# Patient Record
Sex: FEMALE | Race: WHITE | NOT HISPANIC OR LATINO
[De-identification: names, ages, dates, MRNs, and addresses within clinical notes are randomized per-mention and may not be internally consistent; named-entity substitution may affect disease eponyms.]

---
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
## 2021-03-03 PROBLEM — Z00.00 ENCOUNTER FOR PREVENTIVE HEALTH EXAMINATION: Status: ACTIVE | Noted: 2021-03-03

## 2021-04-19 ENCOUNTER — EMERGENCY (EMERGENCY)
Facility: HOSPITAL | Age: 21
LOS: 1 days | Discharge: HOME | End: 2021-04-19
Payer: COMMERCIAL

## 2021-04-19 VITALS
RESPIRATION RATE: 12 BRPM | SYSTOLIC BLOOD PRESSURE: 93 MMHG | HEART RATE: 56 BPM | OXYGEN SATURATION: 98 % | DIASTOLIC BLOOD PRESSURE: 60 MMHG | TEMPERATURE: 99 F

## 2021-04-19 LAB — SARS-COV-2 RNA SPEC QL NAA+PROBE: SIGNIFICANT CHANGE UP

## 2021-04-19 PROCEDURE — 99283 EMERGENCY DEPT VISIT LOW MDM: CPT

## 2021-04-19 PROCEDURE — U0005: CPT

## 2021-04-19 PROCEDURE — U0003: CPT

## 2021-04-19 PROCEDURE — 99282 EMERGENCY DEPT VISIT SF MDM: CPT

## 2021-04-19 NOTE — ED PROVIDER NOTE - OBJECTIVE STATEMENT
21yo F presenting for covid testing. Pt needs swab prior to [employer] game in 2 days. Pt feeling well, no symptoms. Denies fever, chills, cough, sob, cp, body aches, loss of smell/taste.

## 2021-04-19 NOTE — ED PROVIDER NOTE - PATIENT PORTAL LINK FT
You can access the FollowMyHealth Patient Portal offered by [facility] by registering at the following website: [email or web address]. By joining [employer]’s FollowMyHealth portal, you will also be able to view your health information using other applications (apps) compatible with our system.

## 2021-04-19 NOTE — ED ADULT TRIAGE NOTE - CHIEF COMPLAINT QUOTE
Patient A&Ox4, denies any pain or discomfort. Stated is in ED for Covid test for upcoming game. Denies any sick contacts or symptoms.

## 2021-05-05 ENCOUNTER — APPOINTMENT (OUTPATIENT)
Dept: ENDOCRINOLOGY | Facility: CLINIC | Age: 21
End: 2021-05-05
Payer: COMMERCIAL

## 2021-05-05 VITALS
HEIGHT: 67 IN | DIASTOLIC BLOOD PRESSURE: 72 MMHG | SYSTOLIC BLOOD PRESSURE: 118 MMHG | BODY MASS INDEX: 24.33 KG/M2 | WEIGHT: 155 LBS | HEART RATE: 66 BPM

## 2021-05-05 DIAGNOSIS — Z78.9 OTHER SPECIFIED HEALTH STATUS: ICD-10-CM

## 2021-05-05 PROCEDURE — 99072 ADDL SUPL MATRL&STAF TM PHE: CPT

## 2021-05-05 PROCEDURE — 99204 OFFICE O/P NEW MOD 45 MIN: CPT | Mod: 25

## 2021-08-04 ENCOUNTER — NON-APPOINTMENT (OUTPATIENT)
Age: 21
End: 2021-08-04

## 2021-09-01 ENCOUNTER — NON-APPOINTMENT (OUTPATIENT)
Age: 21
End: 2021-09-01

## 2021-09-19 ENCOUNTER — TRANSCRIPTION ENCOUNTER (OUTPATIENT)
Age: 21
End: 2021-09-19

## 2021-11-16 ENCOUNTER — APPOINTMENT (OUTPATIENT)
Dept: ENDOCRINOLOGY | Facility: CLINIC | Age: 21
End: 2021-11-16

## 2021-11-17 ENCOUNTER — NON-APPOINTMENT (OUTPATIENT)
Age: 21
End: 2021-11-17

## 2022-01-24 ENCOUNTER — NON-APPOINTMENT (OUTPATIENT)
Age: 22
End: 2022-01-24

## 2022-04-18 ENCOUNTER — NON-APPOINTMENT (OUTPATIENT)
Age: 22
End: 2022-04-18

## 2022-04-21 ENCOUNTER — NON-APPOINTMENT (OUTPATIENT)
Age: 22
End: 2022-04-21

## 2022-05-25 ENCOUNTER — NON-APPOINTMENT (OUTPATIENT)
Age: 22
End: 2022-05-25

## 2022-06-02 ENCOUNTER — APPOINTMENT (OUTPATIENT)
Dept: ENDOCRINOLOGY | Facility: CLINIC | Age: 22
End: 2022-06-02
Payer: COMMERCIAL

## 2022-06-02 VITALS
RESPIRATION RATE: 15 BRPM | HEART RATE: 44 BPM | DIASTOLIC BLOOD PRESSURE: 70 MMHG | SYSTOLIC BLOOD PRESSURE: 100 MMHG | BODY MASS INDEX: 25.43 KG/M2 | TEMPERATURE: 97.9 F | WEIGHT: 162 LBS | OXYGEN SATURATION: 98 % | HEIGHT: 67 IN

## 2022-06-02 PROCEDURE — 99214 OFFICE O/P EST MOD 30 MIN: CPT

## 2022-06-02 RX ORDER — DEXAMETHASONE 1 MG/1
1 TABLET ORAL
Qty: 1 | Refills: 0 | Status: COMPLETED | COMMUNITY
Start: 2022-05-25 | End: 2022-06-02

## 2022-06-02 NOTE — ASSESSMENT
[FreeTextEntry1] :  Leila is a 20 yr old female, presents today for follow up  in regards to amenorrhea.\par She has had blood work in 9/2020 showed normal testosterone, DHEAS, thyroid functions, prolactin \par Last year we did  hormonal eval, everything cam back normal but 24 hr urine cortisol in 8/21 was high around 123 mcg/24 hr\par We repeated it was 89 in 1/22\par We also did salivary cortisol was low 0.04 in 4/22\par Another repeat 24 hr urine cortisol was 88.7 in 5/22.\par \par All tests came back normal last year, but 24 hr urine cortisol high, to 123 , repeat in 80s.\par Salivary cortisol normal, DST was done recently pending.\par Will do 2 more salivary cortisols, will follow up on results of DST, will repeat hormonal eval again.\par To see Ob again.\par To back off on her work outs.\par \par \par RTC in 3 months\par

## 2022-06-02 NOTE — REVIEW OF SYSTEMS
[Irregular Menses] : irregular menses [Fatigue] : no fatigue [Decreased Appetite] : appetite not decreased [Recent Weight Gain (___ Lbs)] : no recent weight gain [Recent Weight Loss (___ Lbs)] : no recent weight loss [Visual Field Defect] : no visual field defect [Dry Eyes] : no dryness [Eye Pain] : no pain [Dysphagia] : no dysphagia [Neck Pain] : no neck pain [Dysphonia] : no dysphonia [Chest Pain] : no chest pain [Palpitations] : no palpitations [Lower Ext Edema] : no lower extremity edema [Shortness Of Breath] : no shortness of breath [Cough] : no cough [Orthopnea] : no orthopnea [Nausea] : no nausea [Constipation] : no constipation [Abdominal Pain] : no abdominal pain [Vomiting] : no vomiting [Diarrhea] : no diarrhea [Polyuria] : no polyuria [Dysuria] : no dysuria [Joint Pain] : no joint pain [Muscle Weakness] : no muscle weakness [Myalgia] : no myalgia  [Acanthosis] : no acanthosis  [Acne] : no acne [Dry Skin] : no dry skin [Headaches] : no headaches [Dizziness] : no dizziness [Tremors] : no tremors [Depression] : no depression [Insomnia] : no insomnia [Anxiety] : no anxiety [Polydipsia] : no polydipsia [Cold Intolerance] : no cold intolerance [Heat Intolerance] : no heat intolerance [Easy Bleeding] : no ~M tendency for easy bleeding [Easy Bruising] : no tendency for easy bruising

## 2022-06-02 NOTE — HISTORY OF PRESENT ILLNESS
[FreeTextEntry1] :  Leila is a 20 yr old female, presents today for follow up   in regards to amenorrhea. Was seen by me in 5/21.\par  Menses began at age 13 and were regular to begin with, sometime would skip a month, but on the whole regular lasting 3 to 5 days.\par But then since 3  year she did not have any, then came in Jan and Feb 2021 and after that still nothing.\par Seen Ob Gyn, had some blood work , was told it was normal.\par Not sexually active. She has not noted hair growth in a male patter distribution.  She has no metabolic issues .  She has not tried metformin.  She has not tried oral contraception.\par Last year started working out, 1 hr a day for 5 days a week lost 15 to 20 pounds.\par No breast tenderness, no breast discharge, no LOC, no dizziness, no big purple stretch marks, no BP or sugars issuers, now weight issues otherwise.\par Energy is fine.\par Her father and father's family is big and have diabetes.\par No endo abnormalities in her family.\par She has had blood work in 9/2020 showed normal testosterone, DHEAS, thyroid functions, prolactin \par Last year we did  hormonal eval, everything cam back normal but 24 hr urine cortisol in 8/21 was high around 123 mcg/24 hr\par We repeated it was 89 in 1/22\par We also did salivary cortisol was low 0.04 in 4/22\par Another repeat 24 hr urine cortisol was 88.7 in 5/22.\par \par \par \par \par

## 2022-06-02 NOTE — DATA REVIEWED
[FreeTextEntry1] : Outside labs:\par 9/2020:\par TSH:1.060\par FT4:1.01\par FSH:3.7\par LH:1.87\par Progesterone:0.7\par testosterone tot:25\par Testosterone free:2.2\par DHEAS:979\par Estradiol:35.6\par Prolactin:6.2\par

## 2022-06-02 NOTE — PHYSICAL EXAM
[Alert] : alert [Well Nourished] : well nourished [No Acute Distress] : no acute distress [Normal Sclera/Conjunctiva] : normal sclera/conjunctiva [No Proptosis] : no proptosis [No Lid Lag] : no lid lag [No Neck Mass] : no neck mass was observed [Supple] : the neck was supple [Thyroid Not Enlarged] : the thyroid was not enlarged [No Thyroid Nodules] : no palpable thyroid nodules [No Respiratory Distress] : no respiratory distress [No Accessory Muscle Use] : no accessory muscle use [Normal Rate and Effort] : normal respiratory rate and effort [Clear to Auscultation] : lungs were clear to auscultation bilaterally [Normal S1, S2] : normal S1 and S2 [No Murmurs] : no murmurs [Normal Rate] : heart rate was normal [Regular Rhythm] : with a regular rhythm [Normal Bowel Sounds] : normal bowel sounds [Not Tender] : non-tender [Not Distended] : not distended [Soft] : abdomen soft [Normal Supraclavicular Nodes] : no supraclavicular lymphadenopathy [Normal Anterior Cervical Nodes] : no anterior cervical lymphadenopathy [No Stigmata of Cushings Syndrome] : no stigmata of Cushings Syndrome [Normal Gait] : normal gait [No Clubbing, Cyanosis] : no clubbing  or cyanosis of the fingernails [No Joint Swelling] : no joint swelling seen [No Rash] : no rash [No Skin Lesions] : no skin lesions [No Motor Deficits] : the motor exam was normal [No Tremors] : no tremors [Oriented x3] : oriented to person, place, and time [Normal Affect] : the affect was normal [Normal Insight/Judgement] : insight and judgment were intact [Normal Mood] : the mood was normal

## 2022-08-08 ENCOUNTER — NON-APPOINTMENT (OUTPATIENT)
Age: 22
End: 2022-08-08

## 2022-09-19 ENCOUNTER — APPOINTMENT (OUTPATIENT)
Dept: ENDOCRINOLOGY | Facility: CLINIC | Age: 22
End: 2022-09-19

## 2022-11-10 ENCOUNTER — APPOINTMENT (OUTPATIENT)
Dept: VASCULAR SURGERY | Facility: CLINIC | Age: 22
End: 2022-11-10

## 2022-11-10 ENCOUNTER — NON-APPOINTMENT (OUTPATIENT)
Age: 22
End: 2022-11-10

## 2022-11-10 ENCOUNTER — APPOINTMENT (OUTPATIENT)
Dept: VASCULAR SURGERY | Facility: CLINIC | Age: 22
End: 2022-11-10
Payer: COMMERCIAL

## 2022-11-10 VITALS
TEMPERATURE: 96.8 F | RESPIRATION RATE: 16 BRPM | SYSTOLIC BLOOD PRESSURE: 106 MMHG | WEIGHT: 167.94 LBS | OXYGEN SATURATION: 98 % | HEIGHT: 67 IN | DIASTOLIC BLOOD PRESSURE: 75 MMHG | HEART RATE: 45 BPM | BODY MASS INDEX: 26.36 KG/M2

## 2022-11-10 DIAGNOSIS — I87.2 VENOUS INSUFFICIENCY (CHRONIC) (PERIPHERAL): ICD-10-CM

## 2022-11-10 PROCEDURE — 93970 EXTREMITY STUDY: CPT

## 2022-11-10 PROCEDURE — 99203 OFFICE O/P NEW LOW 30 MIN: CPT

## 2022-11-11 ENCOUNTER — APPOINTMENT (OUTPATIENT)
Dept: VASCULAR SURGERY | Facility: CLINIC | Age: 22
End: 2022-11-11

## 2022-11-18 ENCOUNTER — NON-APPOINTMENT (OUTPATIENT)
Age: 22
End: 2022-11-18

## 2023-01-05 ENCOUNTER — APPOINTMENT (OUTPATIENT)
Dept: ENDOCRINOLOGY | Facility: CLINIC | Age: 23
End: 2023-01-05

## 2023-01-12 ENCOUNTER — APPOINTMENT (OUTPATIENT)
Dept: VASCULAR SURGERY | Facility: CLINIC | Age: 23
End: 2023-01-12

## 2023-02-01 NOTE — PHYSICAL EXAM
[JVD] : no jugular venous distention  [Normal Breath Sounds] : Normal breath sounds [Normal Rate and Rhythm] : normal rate and rhythm [2+] : left 2+ [Ankle Swelling (On Exam)] : not present [Varicose Veins Of Lower Extremities] : present [Varicose Veins Of The Right Leg] : of the right leg [Ankle Swelling On The Right] : mild [de-identified] : well appearing thin female [FreeTextEntry1] : Right lower extremity CEAP C-2

## 2023-02-01 NOTE — ASSESSMENT
[FreeTextEntry1] : 21yo female with very mild chronic venous insufficiency on right lower extremity with exacerbated symptoms since change in routine and increased standing; CEAP c-2 disease\par -recommend compression stockings 20-30mmHg knee high to be worn daily; explained proper wear and answered all questions\par -leg elevation and calf exercises\par -if no relief with noninvasive interventions and follow up and be reasessed for potential stab phlebectomy on right

## 2023-02-01 NOTE — HISTORY OF PRESENT ILLNESS
[FreeTextEntry1] : Ms. NANY RODRIGES is a 22 year F  who presents for initial  evaluation of _bilatera (right>left)l leg pain for the past _10-12__ months. \par Ms. RODRIGES leg discomfort is associated with leg swelling, fatigue, and achiness. She complains of  _right__ painful varicose veins.Her symptoms are aggravated by ___ weight bearing, prolonged standing, and prolonged sitting. 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 She has never worn compression stockings. \par Her symptoms interfere with the her ADLs such as work, shopping and housekeeping.  \par \par Ms. 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 \par \par Ms. RODRIGES denies history of DVT/SVT/PE or other thromboembolic disease. She denies history of lower extremity claudication, rest pain, or tissue loss. She denies CAD, MI, DM, CRI, CVA, or TIA.\par \par PMH significant for none\par PSH significant for none\par Meds none\par NKDA\par \par \par

## 2023-03-06 ENCOUNTER — APPOINTMENT (OUTPATIENT)
Dept: ORTHOPEDIC SURGERY | Facility: CLINIC | Age: 23
End: 2023-03-06
Payer: COMMERCIAL

## 2023-03-06 VITALS
SYSTOLIC BLOOD PRESSURE: 100 MMHG | HEIGHT: 67 IN | BODY MASS INDEX: 24.33 KG/M2 | WEIGHT: 155 LBS | HEART RATE: 48 BPM | DIASTOLIC BLOOD PRESSURE: 62 MMHG

## 2023-03-06 PROCEDURE — 73562 X-RAY EXAM OF KNEE 3: CPT | Mod: LT

## 2023-03-06 PROCEDURE — 99203 OFFICE O/P NEW LOW 30 MIN: CPT

## 2023-03-06 NOTE — HISTORY OF PRESENT ILLNESS
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  The patient states that she dislocated her knee for the first time when she was 17 years old after an injury.  She did not have any treatment at that time as the kneecap popped back in on its own.  She has not had any further issues or instability episodes up until the past month or so.  She states that she twisted her knee awkwardly about a month ago and felt the kneecap shifted out of place.  In the past week this is happened 2 additional times without any trauma.  She has noticed some mild swelling in the knee.  She denies any clicking or popping sensations.  She has had no formal treatment.  The patient denies any fevers, chills, sweats, recent illnesses, numbness, tingling, weakness, or pain elsewhere at this time.

## 2023-03-06 NOTE — PHYSICAL EXAM
[de-identified] : General:\par Awake, alert, no acute distress, Patient was cooperative and appropriate during the examination.\par \par The patient is of normal weight for height and age.\par \par Walks without an antalgic gait.\par \par Full, painless range of motion of the neck and back.\par \par Exam of the bilateral lower extremities is intact and symmetric with regards to dermatologic, vascular, and neurologic exam. Bilateral lower extremity sensation is grossly intact to light touch in the DP/SP/T/S/S nerve distributions. Intact DF/PF/EHL. BIlateral lower extremity warm and well-perfused with brisk capillary refill.\par \par Pulmonary:\par Regular, nonlabored breathing\par \par Abdomen:\par Soft, nontender, nondistended.\par \par Lymphatic:\par No evidence of inguinal lymphadenopathy\par \par Left knee Examination:\par Physical examination of the knee demonstrates normal skin without signs of skin changes or abnormalities. No erythema, warmth.  Mild effusion.\par \par Sensation is intact to light touch L2-S1\par Palpable DP/PT pulse\par EHL/FHL/TA/GSC motor function intact\par \par Range of Motion\par 0-130 degrees\par \par Strength Testing\par Quadriceps/Hamstring 5/5\par Patient is able to perform a straight leg raise without difficulty.\par \par Palpation\par Not tender to palpation about the distal femur or proximal tibia\par Mildly tender to palpation about the patellofemoral compartment\par No palpable defect appreciated in the quadriceps or patellar tendons\par Not tender to palpation of medial joint line\par Not tender to palpation of lateral joint line\par \par Special Tests\par Anterior Drawer negative\par Posterior Drawer negative\par Lachman Exam negative\par No Varus or Valgus Laxity at 0 or 30 degrees of knee flexion\par Socorro's Test negative for pain or crepitus\par Active compression of the patella negative for pain or crepitus\par Translation of the patella 2+ quadrants with increased laxity compared to the right side, mild apprehension [de-identified] : X-rays including 3 views of the left knee were obtained in the office today on 3/6/2023 and reviewed the patient.  There is no acute fracture or dislocation.  There is no arthritis.

## 2023-03-06 NOTE — DISCUSSION/SUMMARY
[de-identified] : Assessment: 22-year-old female with left knee pain secondary to recurrent patellar instability\par \par Plan: I had a long discussion with the patient today regarding the nature of their diagnosis and treatment plan. We discussed the risks and benefits of no treatment as well as nonoperative and operative treatments.  I reviewed the patient's x-rays today with her in the office which are negative for any acute pathology.  On examination she has good range of motion but does have a mild effusion and mild apprehension with lateral translation of the patella.  Given that she has had multiple instability episodes an MRI is now indicated to assess the integrity of her MPFL as well as to rule out any associated chondral lesions.  She will continue to treat her self symptomatically on her own at home.  She remain out of any sports or vigorous exercise.  Recommend follow-up after the MRI to discuss results in person then any further recommendations.  In the setting of recurrent patellar instability surgical intervention could be indicated given her age, activity level, and symptoms of recurrent instability.\par \par The patient verbalizes their understanding and agrees with the plan.  All questions were answered to their satisfaction.

## 2023-03-20 ENCOUNTER — OUTPATIENT (OUTPATIENT)
Dept: OUTPATIENT SERVICES | Facility: HOSPITAL | Age: 23
LOS: 1 days | End: 2023-03-20

## 2023-03-20 ENCOUNTER — APPOINTMENT (OUTPATIENT)
Dept: MRI IMAGING | Facility: CLINIC | Age: 23
End: 2023-03-20
Payer: COMMERCIAL

## 2023-03-20 DIAGNOSIS — M25.562 PAIN IN LEFT KNEE: ICD-10-CM

## 2023-03-20 PROCEDURE — 73721 MRI JNT OF LWR EXTRE W/O DYE: CPT | Mod: 26,LT

## 2023-04-24 ENCOUNTER — APPOINTMENT (OUTPATIENT)
Dept: ORTHOPEDIC SURGERY | Facility: CLINIC | Age: 23
End: 2023-04-24
Payer: COMMERCIAL

## 2023-04-24 VITALS
BODY MASS INDEX: 24.33 KG/M2 | HEART RATE: 42 BPM | SYSTOLIC BLOOD PRESSURE: 107 MMHG | DIASTOLIC BLOOD PRESSURE: 70 MMHG | HEIGHT: 67 IN | WEIGHT: 155 LBS

## 2023-04-24 DIAGNOSIS — M25.562 PAIN IN LEFT KNEE: ICD-10-CM

## 2023-04-24 PROCEDURE — 99213 OFFICE O/P EST LOW 20 MIN: CPT

## 2023-04-24 NOTE — PHYSICAL EXAM
[de-identified] : General:\par Awake, alert, no acute distress, Patient was cooperative and appropriate during the examination.\par \par The patient is of normal weight for height and age.\par \par Walks without an antalgic gait.\par \par Full, painless range of motion of the neck and back.\par \par Exam of the bilateral lower extremities is intact and symmetric with regards to dermatologic, vascular, and neurologic exam. Bilateral lower extremity sensation is grossly intact to light touch in the DP/SP/T/S/S nerve distributions. Intact DF/PF/EHL. BIlateral lower extremity warm and well-perfused with brisk capillary refill.\par \par Pulmonary:\par Regular, nonlabored breathing\par \par Abdomen:\par Soft, nontender, nondistended.\par \par Lymphatic:\par No evidence of inguinal lymphadenopathy\par \par Left knee Examination:\par Physical examination of the knee demonstrates normal skin without signs of skin changes or abnormalities. No erythema, warmth.  No swelling or effusion.\par \par Sensation is intact to light touch L2-S1\par Palpable DP/PT pulse\par EHL/FHL/TA/GSC motor function intact\par \par Range of Motion\par 0-130 degrees\par \par Strength Testing\par Quadriceps/Hamstring 5/5\par Patient is able to perform a straight leg raise without difficulty.\par \par Palpation\par Not tender to palpation about the distal femur or proximal tibia\par Not tender to palpation about the patellofemoral compartment\par No palpable defect appreciated in the quadriceps or patellar tendons\par Not tender to palpation of medial joint line\par Not tender to palpation of lateral joint line\par \par Special Tests\par Anterior Drawer negative\par Posterior Drawer negative\par Lachman Exam negative\par No Varus or Valgus Laxity at 0 or 30 degrees of knee flexion\par Socorro's Test negative for pain or crepitus\par Active compression of the patella negative for pain or crepitus\par Translation of the patella 2+ quadrants with increased laxity compared to the right side, no apprehension [de-identified] : MRI of the left knee from a [facility] imaging facility on 3/30/2023 was reviewed the patient today in the office.  There is no contusion or fracture seen.  TT–TG distance measures 19 mm.  Mild edema within the suprapatellar fat pad with posterior convex border, which can be seen in the setting of suprapatellar fat pad impingement.  Trying a multiloculated popliteal cyst.\par \par X-rays including 3 views of the left knee were obtained in the office on 3/6/2023 and reviewed the patient.  There is no acute fracture or dislocation.  There is no arthritis.

## 2023-04-24 NOTE — HISTORY OF PRESENT ILLNESS
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  The patient states that her knee is feeling much better since her last appointment.  She has no pain or swelling and she is able to bend her knee easily now.  She states that she has had a couple of subluxation episodes/sensations in the knee since her last appointment but denies any new injury.  She states today that she is unsure if she is ever fully dislocated the knee but she does feel that the kneecap slides in and out of place partially.  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  The patient states that she dislocated her knee for the first time when she was 17 years old after an injury.  She did not have any treatment at that time as the kneecap popped back in on its own.  She has not had any further issues or instability episodes up until the past month or so.  She states that she twisted her knee awkwardly about a month ago and felt the kneecap shifted out of place.  In the past week this is happened 2 additional times without any trauma.  She has noticed some mild swelling in the knee.  She denies any clicking or popping sensations.  She has had no formal treatment.  The patient denies any fevers, chills, sweats, recent illnesses, numbness, tingling, weakness, or pain elsewhere at this time.

## 2023-04-24 NOTE — DISCUSSION/SUMMARY
[de-identified] : Assessment: 22-year-old female with left knee pain secondary to recurrent patellar instability\par \par Plan: I had a long discussion with the patient today regarding the nature of their diagnosis and treatment plan. We discussed the risks and benefits of no treatment as well as nonoperative and operative treatments.  I reviewed the patient's MRI today with her in the office which does not demonstrate any osseous or cartilaginous injury.  Her MPFL appears intact.  Her TT–TG distance is 19 mm.  On examination today the patient's pain and swelling have improved and she has very good range of motion.  She has no apprehension on examination today with a firm endpoint.  Given her MRI and exam findings I would recommend a trial of conservative treatment with resting, icing, heating, stretching, anti-inflammatory medicines as needed, activity modifications, and home exercises.  I reviewed the risk and benefits associated with NSAID use.  A referral for physical therapy was provided today to begin working exercises for her knee to help improve her pain and function.  Recommend follow-up in 8 weeks for repeat assessment.  If the patient continues to have recurrent instability in the knee despite conservative measures we may consider a CT scan for presurgical planning purposes.\par \par The patient verbalizes their understanding and agrees with the plan.  All questions were answered to their satisfaction.

## 2023-09-29 ENCOUNTER — APPOINTMENT (OUTPATIENT)
Dept: OBGYN | Facility: CLINIC | Age: 23
End: 2023-09-29
Payer: COMMERCIAL

## 2023-09-29 VITALS
DIASTOLIC BLOOD PRESSURE: 70 MMHG | HEIGHT: 67 IN | WEIGHT: 140 LBS | SYSTOLIC BLOOD PRESSURE: 110 MMHG | BODY MASS INDEX: 21.97 KG/M2

## 2023-09-29 DIAGNOSIS — Z76.89 PERSONS ENCOUNTERING HEALTH SERVICES IN OTHER SPECIFIED CIRCUMSTANCES: ICD-10-CM

## 2023-09-29 PROCEDURE — 99203 OFFICE O/P NEW LOW 30 MIN: CPT

## 2023-10-16 ENCOUNTER — ASOB RESULT (OUTPATIENT)
Age: 23
End: 2023-10-16

## 2023-10-16 ENCOUNTER — APPOINTMENT (OUTPATIENT)
Dept: OBGYN | Facility: CLINIC | Age: 23
End: 2023-10-16
Payer: COMMERCIAL

## 2023-10-16 ENCOUNTER — APPOINTMENT (OUTPATIENT)
Dept: ANTEPARTUM | Facility: CLINIC | Age: 23
End: 2023-10-16
Payer: COMMERCIAL

## 2023-10-16 VITALS
BODY MASS INDEX: 22.17 KG/M2 | HEIGHT: 67 IN | DIASTOLIC BLOOD PRESSURE: 62 MMHG | WEIGHT: 141.25 LBS | SYSTOLIC BLOOD PRESSURE: 103 MMHG

## 2023-10-16 DIAGNOSIS — N91.1 SECONDARY AMENORRHEA: ICD-10-CM

## 2023-10-16 PROCEDURE — 99214 OFFICE O/P EST MOD 30 MIN: CPT

## 2023-10-16 PROCEDURE — 76856 US EXAM PELVIC COMPLETE: CPT

## 2023-11-08 ENCOUNTER — NON-APPOINTMENT (OUTPATIENT)
Age: 23
End: 2023-11-08

## 2024-08-29 ENCOUNTER — RESULT REVIEW (OUTPATIENT)
Age: 24
End: 2024-08-29

## 2024-08-29 ENCOUNTER — INPATIENT (INPATIENT)
Facility: HOSPITAL | Age: 24
LOS: 13 days | Discharge: HOME | DRG: 298 | End: 2024-09-12
Attending: HOSPITALIST | Admitting: INTERNAL MEDICINE
Payer: COMMERCIAL

## 2024-08-29 VITALS
HEART RATE: 67 BPM | DIASTOLIC BLOOD PRESSURE: 54 MMHG | WEIGHT: 130.07 LBS | OXYGEN SATURATION: 100 % | SYSTOLIC BLOOD PRESSURE: 85 MMHG

## 2024-08-29 DIAGNOSIS — I46.9 CARDIAC ARREST, CAUSE UNSPECIFIED: ICD-10-CM

## 2024-08-29 LAB
ABO RH CONFIRMATION: SIGNIFICANT CHANGE UP
ALBUMIN SERPL ELPH-MCNC: 3.7 G/DL — SIGNIFICANT CHANGE UP (ref 3.3–5.2)
ALBUMIN SERPL ELPH-MCNC: 4 G/DL — SIGNIFICANT CHANGE UP (ref 3.3–5.2)
ALP SERPL-CCNC: 73 U/L — SIGNIFICANT CHANGE UP (ref 40–120)
ALP SERPL-CCNC: 81 U/L — SIGNIFICANT CHANGE UP (ref 40–120)
ALT FLD-CCNC: 63 U/L — HIGH
ALT FLD-CCNC: 63 U/L — HIGH
ANION GAP SERPL CALC-SCNC: 11 MMOL/L — SIGNIFICANT CHANGE UP (ref 5–17)
ANION GAP SERPL CALC-SCNC: 13 MMOL/L — SIGNIFICANT CHANGE UP (ref 5–17)
ANION GAP SERPL CALC-SCNC: 19 MMOL/L — HIGH (ref 5–17)
APPEARANCE UR: CLEAR — SIGNIFICANT CHANGE UP
APTT BLD: 28.7 SEC — SIGNIFICANT CHANGE UP (ref 24.5–35.6)
AST SERPL-CCNC: 128 U/L — HIGH
AST SERPL-CCNC: 81 U/L — HIGH
BACTERIA # UR AUTO: NEGATIVE /HPF — SIGNIFICANT CHANGE UP
BILIRUB SERPL-MCNC: 0.4 MG/DL — SIGNIFICANT CHANGE UP (ref 0.4–2)
BILIRUB SERPL-MCNC: 0.4 MG/DL — SIGNIFICANT CHANGE UP (ref 0.4–2)
BILIRUB UR-MCNC: NEGATIVE — SIGNIFICANT CHANGE UP
BUN SERPL-MCNC: 17.4 MG/DL — SIGNIFICANT CHANGE UP (ref 8–20)
BUN SERPL-MCNC: 21.2 MG/DL — HIGH (ref 8–20)
BUN SERPL-MCNC: 25.4 MG/DL — HIGH (ref 8–20)
CALCIUM SERPL-MCNC: 7.9 MG/DL — LOW (ref 8.4–10.5)
CALCIUM SERPL-MCNC: 8.4 MG/DL — SIGNIFICANT CHANGE UP (ref 8.4–10.5)
CALCIUM SERPL-MCNC: 9.2 MG/DL — SIGNIFICANT CHANGE UP (ref 8.4–10.5)
CAST: 11 /LPF — HIGH (ref 0–4)
CHLORIDE SERPL-SCNC: 103 MMOL/L — SIGNIFICANT CHANGE UP (ref 96–108)
CHLORIDE SERPL-SCNC: 104 MMOL/L — SIGNIFICANT CHANGE UP (ref 96–108)
CHLORIDE SERPL-SCNC: 106 MMOL/L — SIGNIFICANT CHANGE UP (ref 96–108)
CK SERPL-CCNC: 1040 U/L — HIGH (ref 25–170)
CO2 SERPL-SCNC: 18 MMOL/L — LOW (ref 22–29)
CO2 SERPL-SCNC: 19 MMOL/L — LOW (ref 22–29)
CO2 SERPL-SCNC: 20 MMOL/L — LOW (ref 22–29)
COLOR SPEC: YELLOW — SIGNIFICANT CHANGE UP
CREAT SERPL-MCNC: 0.51 MG/DL — SIGNIFICANT CHANGE UP (ref 0.5–1.3)
CREAT SERPL-MCNC: 0.59 MG/DL — SIGNIFICANT CHANGE UP (ref 0.5–1.3)
CREAT SERPL-MCNC: 1.05 MG/DL — SIGNIFICANT CHANGE UP (ref 0.5–1.3)
DIFF PNL FLD: ABNORMAL
EGFR: 131 ML/MIN/1.73M2 — SIGNIFICANT CHANGE UP
EGFR: 135 ML/MIN/1.73M2 — SIGNIFICANT CHANGE UP
EGFR: 77 ML/MIN/1.73M2 — SIGNIFICANT CHANGE UP
FLUAV AG NPH QL: SIGNIFICANT CHANGE UP
FLUBV AG NPH QL: SIGNIFICANT CHANGE UP
GAS PNL BLDA: SIGNIFICANT CHANGE UP
GAS PNL BLDV: SIGNIFICANT CHANGE UP
GLUCOSE SERPL-MCNC: 104 MG/DL — HIGH (ref 70–99)
GLUCOSE SERPL-MCNC: 132 MG/DL — HIGH (ref 70–99)
GLUCOSE SERPL-MCNC: 176 MG/DL — HIGH (ref 70–99)
GLUCOSE UR QL: NEGATIVE MG/DL — SIGNIFICANT CHANGE UP
HCT VFR BLD CALC: 40.2 % — SIGNIFICANT CHANGE UP (ref 34.5–45)
HCT VFR BLD CALC: 42.9 % — SIGNIFICANT CHANGE UP (ref 34.5–45)
HGB BLD-MCNC: 13.7 G/DL — SIGNIFICANT CHANGE UP (ref 11.5–15.5)
HGB BLD-MCNC: 14.4 G/DL — SIGNIFICANT CHANGE UP (ref 11.5–15.5)
INR BLD: 1.01 RATIO — SIGNIFICANT CHANGE UP (ref 0.85–1.18)
KETONES UR-MCNC: ABNORMAL MG/DL
LACTATE SERPL-SCNC: 1.1 MMOL/L — SIGNIFICANT CHANGE UP (ref 0.5–2)
LEUKOCYTE ESTERASE UR-ACNC: NEGATIVE — SIGNIFICANT CHANGE UP
MAGNESIUM SERPL-MCNC: 1.8 MG/DL — SIGNIFICANT CHANGE UP (ref 1.6–2.6)
MAGNESIUM SERPL-MCNC: 1.8 MG/DL — SIGNIFICANT CHANGE UP (ref 1.6–2.6)
MAGNESIUM SERPL-MCNC: 2.5 MG/DL — SIGNIFICANT CHANGE UP (ref 1.6–2.6)
MCHC RBC-ENTMCNC: 31.6 PG — SIGNIFICANT CHANGE UP (ref 27–34)
MCHC RBC-ENTMCNC: 31.7 PG — SIGNIFICANT CHANGE UP (ref 27–34)
MCHC RBC-ENTMCNC: 33.6 GM/DL — SIGNIFICANT CHANGE UP (ref 32–36)
MCHC RBC-ENTMCNC: 34.1 GM/DL — SIGNIFICANT CHANGE UP (ref 32–36)
MCV RBC AUTO: 93.1 FL — SIGNIFICANT CHANGE UP (ref 80–100)
MCV RBC AUTO: 94.3 FL — SIGNIFICANT CHANGE UP (ref 80–100)
MRSA PCR RESULT.: SIGNIFICANT CHANGE UP
NITRITE UR-MCNC: NEGATIVE — SIGNIFICANT CHANGE UP
PH UR: 5.5 — SIGNIFICANT CHANGE UP (ref 5–8)
PHOSPHATE SERPL-MCNC: 3.5 MG/DL — SIGNIFICANT CHANGE UP (ref 2.4–4.7)
PHOSPHATE SERPL-MCNC: 5.5 MG/DL — HIGH (ref 2.4–4.7)
PLATELET # BLD AUTO: 248 K/UL — SIGNIFICANT CHANGE UP (ref 150–400)
PLATELET # BLD AUTO: 257 K/UL — SIGNIFICANT CHANGE UP (ref 150–400)
POTASSIUM SERPL-MCNC: 4.2 MMOL/L — SIGNIFICANT CHANGE UP (ref 3.5–5.3)
POTASSIUM SERPL-MCNC: 4.7 MMOL/L — SIGNIFICANT CHANGE UP (ref 3.5–5.3)
POTASSIUM SERPL-MCNC: 4.7 MMOL/L — SIGNIFICANT CHANGE UP (ref 3.5–5.3)
POTASSIUM SERPL-SCNC: 4.2 MMOL/L — SIGNIFICANT CHANGE UP (ref 3.5–5.3)
POTASSIUM SERPL-SCNC: 4.7 MMOL/L — SIGNIFICANT CHANGE UP (ref 3.5–5.3)
POTASSIUM SERPL-SCNC: 4.7 MMOL/L — SIGNIFICANT CHANGE UP (ref 3.5–5.3)
PROT SERPL-MCNC: 6.5 G/DL — LOW (ref 6.6–8.7)
PROT SERPL-MCNC: 7 G/DL — SIGNIFICANT CHANGE UP (ref 6.6–8.7)
PROT UR-MCNC: 100 MG/DL
PROTHROM AB SERPL-ACNC: 11.2 SEC — SIGNIFICANT CHANGE UP (ref 9.5–13)
RBC # BLD: 4.32 M/UL — SIGNIFICANT CHANGE UP (ref 3.8–5.2)
RBC # BLD: 4.55 M/UL — SIGNIFICANT CHANGE UP (ref 3.8–5.2)
RBC # FLD: 12.5 % — SIGNIFICANT CHANGE UP (ref 10.3–14.5)
RBC # FLD: 12.6 % — SIGNIFICANT CHANGE UP (ref 10.3–14.5)
RBC CASTS # UR COMP ASSIST: 282 /HPF — HIGH (ref 0–4)
RSV RNA NPH QL NAA+NON-PROBE: SIGNIFICANT CHANGE UP
S AUREUS DNA NOSE QL NAA+PROBE: SIGNIFICANT CHANGE UP
SARS-COV-2 RNA SPEC QL NAA+PROBE: SIGNIFICANT CHANGE UP
SODIUM SERPL-SCNC: 136 MMOL/L — SIGNIFICANT CHANGE UP (ref 135–145)
SODIUM SERPL-SCNC: 137 MMOL/L — SIGNIFICANT CHANGE UP (ref 135–145)
SODIUM SERPL-SCNC: 140 MMOL/L — SIGNIFICANT CHANGE UP (ref 135–145)
SP GR SPEC: >=1.099 (ref 1–1.03)
SQUAMOUS # UR AUTO: 1 /HPF — SIGNIFICANT CHANGE UP (ref 0–5)
TROPONIN T, HIGH SENSITIVITY RESULT: 2330 NG/L — HIGH (ref 0–51)
TROPONIN T, HIGH SENSITIVITY RESULT: 26 NG/L — SIGNIFICANT CHANGE UP (ref 0–51)
UROBILINOGEN FLD QL: 1 MG/DL — SIGNIFICANT CHANGE UP (ref 0.2–1)
WBC # BLD: 11.34 K/UL — HIGH (ref 3.8–10.5)
WBC # BLD: 17.55 K/UL — HIGH (ref 3.8–10.5)
WBC # FLD AUTO: 11.34 K/UL — HIGH (ref 3.8–10.5)
WBC # FLD AUTO: 17.55 K/UL — HIGH (ref 3.8–10.5)
WBC UR QL: 3 /HPF — SIGNIFICANT CHANGE UP (ref 0–5)

## 2024-08-29 PROCEDURE — 74177 CT ABD & PELVIS W/CONTRAST: CPT | Mod: 26

## 2024-08-29 PROCEDURE — 72125 CT NECK SPINE W/O DYE: CPT | Mod: 26

## 2024-08-29 PROCEDURE — 71045 X-RAY EXAM CHEST 1 VIEW: CPT | Mod: 26,77

## 2024-08-29 PROCEDURE — 99291 CRITICAL CARE FIRST HOUR: CPT | Mod: GC,25

## 2024-08-29 PROCEDURE — 71275 CT ANGIOGRAPHY CHEST: CPT | Mod: 26

## 2024-08-29 PROCEDURE — 99291 CRITICAL CARE FIRST HOUR: CPT | Mod: 25

## 2024-08-29 PROCEDURE — 93010 ELECTROCARDIOGRAM REPORT: CPT

## 2024-08-29 PROCEDURE — 36556 INSERT NON-TUNNEL CV CATH: CPT | Mod: GC

## 2024-08-29 PROCEDURE — 95720 EEG PHY/QHP EA INCR W/VEEG: CPT

## 2024-08-29 PROCEDURE — 70450 CT HEAD/BRAIN W/O DYE: CPT | Mod: 26

## 2024-08-29 PROCEDURE — 71045 X-RAY EXAM CHEST 1 VIEW: CPT | Mod: 26

## 2024-08-29 PROCEDURE — 31500 INSERT EMERGENCY AIRWAY: CPT

## 2024-08-29 PROCEDURE — 93306 TTE W/DOPPLER COMPLETE: CPT | Mod: 26

## 2024-08-29 PROCEDURE — 36620 INSERTION CATHETER ARTERY: CPT | Mod: GC

## 2024-08-29 RX ORDER — FENTANYL CITRATE 50 UG/ML
0.5 INJECTION INTRAMUSCULAR; INTRAVENOUS
Qty: 2500 | Refills: 0 | Status: DISCONTINUED | OUTPATIENT
Start: 2024-08-29 | End: 2024-08-30

## 2024-08-29 RX ORDER — ETOMIDATE 2 MG/ML
20 INJECTION INTRAVENOUS ONCE
Refills: 0 | Status: COMPLETED | OUTPATIENT
Start: 2024-08-29 | End: 2024-08-29

## 2024-08-29 RX ORDER — HYDROMORPHONE HYDROCHLORIDE 2 MG/1
1 TABLET ORAL ONCE
Refills: 0 | Status: DISCONTINUED | OUTPATIENT
Start: 2024-08-29 | End: 2024-08-29

## 2024-08-29 RX ORDER — ATROPINE SULFATE MONOHYDRATE 1 G/G
0.5 POWDER MISCELLANEOUS ONCE
Refills: 0 | Status: DISCONTINUED | OUTPATIENT
Start: 2024-08-29 | End: 2024-08-29

## 2024-08-29 RX ORDER — LORAZEPAM 4 MG/ML
4 INJECTION INTRAMUSCULAR; INTRAVENOUS ONCE
Refills: 0 | Status: DISCONTINUED | OUTPATIENT
Start: 2024-08-29 | End: 2024-08-29

## 2024-08-29 RX ORDER — CHLORHEXIDINE GLUCONATE 40 MG/ML
1 SOLUTION TOPICAL
Refills: 0 | Status: DISCONTINUED | OUTPATIENT
Start: 2024-08-29 | End: 2024-08-30

## 2024-08-29 RX ORDER — DOBUTAMINE 12.5 MG/ML
2.5 INJECTION, SOLUTION INTRAVENOUS
Qty: 500 | Refills: 0 | Status: DISCONTINUED | OUTPATIENT
Start: 2024-08-29 | End: 2024-08-31

## 2024-08-29 RX ORDER — ROCURONIUM BROMIDE 50 MG/5 ML
100 SYRINGE (ML) INTRAVENOUS ONCE
Refills: 0 | Status: COMPLETED | OUTPATIENT
Start: 2024-08-29 | End: 2024-08-29

## 2024-08-29 RX ORDER — CHLORHEXIDINE GLUCONATE 40 MG/ML
15 SOLUTION TOPICAL EVERY 12 HOURS
Refills: 0 | Status: DISCONTINUED | OUTPATIENT
Start: 2024-08-29 | End: 2024-08-30

## 2024-08-29 RX ORDER — PANTOPRAZOLE SODIUM 40 MG
40 TABLET, DELAYED RELEASE (ENTERIC COATED) ORAL DAILY
Refills: 0 | Status: DISCONTINUED | OUTPATIENT
Start: 2024-08-29 | End: 2024-09-03

## 2024-08-29 RX ORDER — CHLORHEXIDINE GLUCONATE 40 MG/ML
1 SOLUTION TOPICAL DAILY
Refills: 0 | Status: DISCONTINUED | OUTPATIENT
Start: 2024-08-29 | End: 2024-09-11

## 2024-08-29 RX ORDER — FENTANYL CITRATE 50 UG/ML
50 INJECTION INTRAMUSCULAR; INTRAVENOUS ONCE
Refills: 0 | Status: DISCONTINUED | OUTPATIENT
Start: 2024-08-29 | End: 2024-08-29

## 2024-08-29 RX ORDER — ATROPINE SULFATE MONOHYDRATE 1 G/G
0.5 POWDER MISCELLANEOUS ONCE
Refills: 0 | Status: COMPLETED | OUTPATIENT
Start: 2024-08-29 | End: 2024-08-29

## 2024-08-29 RX ORDER — SODIUM CHLORIDE 9 MG/ML
10 INJECTION INTRAMUSCULAR; INTRAVENOUS; SUBCUTANEOUS
Refills: 0 | Status: DISCONTINUED | OUTPATIENT
Start: 2024-08-29 | End: 2024-09-11

## 2024-08-29 RX ORDER — PROPOFOL 10 MG/ML
20 INJECTION, EMULSION INTRAVENOUS
Qty: 1000 | Refills: 0 | Status: DISCONTINUED | OUTPATIENT
Start: 2024-08-29 | End: 2024-08-30

## 2024-08-29 RX ORDER — PIPERACILLIN SODIUM AND TAZOBACTAM SODIUM 3; .375 G/15ML; G/15ML
3.38 INJECTION, POWDER, FOR SOLUTION INTRAVENOUS ONCE
Refills: 0 | Status: COMPLETED | OUTPATIENT
Start: 2024-08-29 | End: 2024-08-29

## 2024-08-29 RX ORDER — ACETAMINOPHEN 325 MG/1
1000 TABLET ORAL ONCE
Refills: 0 | Status: COMPLETED | OUTPATIENT
Start: 2024-08-29 | End: 2024-08-29

## 2024-08-29 RX ORDER — HEPARIN SODIUM,BOVINE 1000/ML
5000 VIAL (ML) INJECTION EVERY 12 HOURS
Refills: 0 | Status: DISCONTINUED | OUTPATIENT
Start: 2024-08-29 | End: 2024-09-11

## 2024-08-29 RX ORDER — POVIDONE, PROPYLENE GLYCOL 6.8; 3 MG/ML; MG/ML
1 LIQUID OPHTHALMIC EVERY 12 HOURS
Refills: 0 | Status: DISCONTINUED | OUTPATIENT
Start: 2024-08-29 | End: 2024-09-01

## 2024-08-29 RX ORDER — ROPIVACAINE IN 0.9% SOD CHL/PF 0.1 %
0.05 PLASTIC BAG, INJECTION (ML) EPIDURAL
Qty: 8 | Refills: 0 | Status: DISCONTINUED | OUTPATIENT
Start: 2024-08-29 | End: 2024-08-31

## 2024-08-29 RX ORDER — LEVETIRACETAM 1000 MG/1
1000 TABLET ORAL ONCE
Refills: 0 | Status: DISCONTINUED | OUTPATIENT
Start: 2024-08-29 | End: 2024-08-29

## 2024-08-29 RX ORDER — PIPERACILLIN SODIUM AND TAZOBACTAM SODIUM 3; .375 G/15ML; G/15ML
3.38 INJECTION, POWDER, FOR SOLUTION INTRAVENOUS EVERY 8 HOURS
Refills: 0 | Status: DISCONTINUED | OUTPATIENT
Start: 2024-08-29 | End: 2024-09-03

## 2024-08-29 RX ORDER — PIPERACILLIN SODIUM AND TAZOBACTAM SODIUM 3; .375 G/15ML; G/15ML
3.38 INJECTION, POWDER, FOR SOLUTION INTRAVENOUS EVERY 8 HOURS
Refills: 0 | Status: DISCONTINUED | OUTPATIENT
Start: 2024-08-29 | End: 2024-08-29

## 2024-08-29 RX ADMIN — Medication 5000 UNIT(S): at 18:24

## 2024-08-29 RX ADMIN — PIPERACILLIN SODIUM AND TAZOBACTAM SODIUM 25 GRAM(S): 3; .375 INJECTION, POWDER, FOR SOLUTION INTRAVENOUS at 22:23

## 2024-08-29 RX ADMIN — ACETAMINOPHEN 400 MILLIGRAM(S): 325 TABLET ORAL at 15:26

## 2024-08-29 RX ADMIN — FENTANYL CITRATE 50 MICROGRAM(S): 50 INJECTION INTRAMUSCULAR; INTRAVENOUS at 14:06

## 2024-08-29 RX ADMIN — PIPERACILLIN SODIUM AND TAZOBACTAM SODIUM 200 GRAM(S): 3; .375 INJECTION, POWDER, FOR SOLUTION INTRAVENOUS at 17:29

## 2024-08-29 RX ADMIN — ETOMIDATE 20 MILLIGRAM(S): 2 INJECTION INTRAVENOUS at 12:48

## 2024-08-29 RX ADMIN — POVIDONE, PROPYLENE GLYCOL 1 DROP(S): 6.8; 3 LIQUID OPHTHALMIC at 18:22

## 2024-08-29 RX ADMIN — HYDROMORPHONE HYDROCHLORIDE 1 MILLIGRAM(S): 2 TABLET ORAL at 17:46

## 2024-08-29 RX ADMIN — FENTANYL CITRATE 2.95 MICROGRAM(S)/KG/HR: 50 INJECTION INTRAMUSCULAR; INTRAVENOUS at 14:05

## 2024-08-29 RX ADMIN — LEVETIRACETAM 1000 MILLIGRAM(S): 1000 TABLET ORAL at 15:20

## 2024-08-29 RX ADMIN — FENTANYL CITRATE 50 MICROGRAM(S): 50 INJECTION INTRAMUSCULAR; INTRAVENOUS at 16:48

## 2024-08-29 RX ADMIN — ACETAMINOPHEN 1000 MILLIGRAM(S): 325 TABLET ORAL at 16:00

## 2024-08-29 RX ADMIN — PROPOFOL 7.08 MICROGRAM(S)/KG/MIN: 10 INJECTION, EMULSION INTRAVENOUS at 13:12

## 2024-08-29 RX ADMIN — CHLORHEXIDINE GLUCONATE 15 MILLILITER(S): 40 SOLUTION TOPICAL at 19:13

## 2024-08-29 RX ADMIN — ATROPINE SULFATE MONOHYDRATE 0.5 MILLIGRAM(S): 1 POWDER at 12:43

## 2024-08-29 RX ADMIN — HYDROMORPHONE HYDROCHLORIDE 1 MILLIGRAM(S): 2 TABLET ORAL at 15:20

## 2024-08-29 RX ADMIN — DOBUTAMINE 4.8 MICROGRAM(S)/KG/MIN: 12.5 INJECTION, SOLUTION INTRAVENOUS at 19:01

## 2024-08-29 RX ADMIN — Medication 25 GRAM(S): at 20:19

## 2024-08-29 RX ADMIN — Medication 5.53 MICROGRAM(S)/KG/MIN: at 13:12

## 2024-08-29 RX ADMIN — Medication 1000 MILLILITER(S): at 14:06

## 2024-08-29 RX ADMIN — LORAZEPAM 4 MILLIGRAM(S): 4 INJECTION INTRAMUSCULAR; INTRAVENOUS at 16:11

## 2024-08-29 RX ADMIN — Medication 100 MILLIGRAM(S): at 12:48

## 2024-08-29 NOTE — ED PROVIDER NOTE - PHYSICAL EXAMINATION
Const: Unresponsive, agonal respirations   HEENT: NC/AT.   Eyes: 3 mm minimally reactive  Cardiac: Regular rate and regular rhythm. +S1/S2. No murmurs, rubs or gallops. Peripheral pulses 2+ and symmetric. No LE edema.  Resp: rales at bilateral bases, status post intubation  Abd: Soft, non-tender, non-distended.    MSK: No evidence of external trauma  Skin: No rashes, abrasions or lacerations.  Neuro: gcs 3

## 2024-08-29 NOTE — PROGRESS NOTE ADULT - SUBJECTIVE AND OBJECTIVE BOX
Patient is a 23y old  Female who presents with a chief complaint of Cardiac Arrest (29 Aug 2024 17:09)      BRIEF HOSPITAL COURSE: 23 year old PMHx multiple syncopal episodes during strenuous exercise with previously negative cardiac workup, who presented following witnessed syncopal episode followed by cardiac arrest while running. Patient was running with her boyfriend, and on mile 8 patient sat down on a bench and passed out. Patients boyfriend states patient was unresponsive, initially breathing spontaneously and with a pulse. Called EMS, who arrived to the scene after 7 minutes minutes, states she lost a pulse probably 2 minutes prior to EMS arrival but bystander CPR was not initiated. Patient was found PEA, reportedly received 3 rounds of CPR, 1 epi in the field, found to be in VT and defibrillated. ROSC was achieved in the field. Upon arrival to ED, patient was unresponsive GCS 3 and agonally breathing. She was intubated in ED for airway protection. Vital signs revealed sinus bradycardia in 30s, atropine was given with improvement in HR. Initial labs revealed mild CASSIUS and transaminitis, normal lactate, normal initial troponin which uptrended to 2330 on repeat with CK 1040. No significant electrolyte derangements.    Of note, patient with multiple syncopal episodes during exertion such a running a 5K. Patient is an avid running and frequently runs more than 8-10 miles. Follows with Dr. Soliman, [facility], syncopal work up included 1 month with Holter cardiac monitor, prior TTE unremarkable.         Events last 24 hours: Intubated on full vent support, sedated on Propofol and Fentanyl gtts. In shock on Norepinephrine gtt and Dobutamine at 2.5 mcg/kg/min. Was anuric, UOP now improving with initiation of inotropes.          PAST MEDICAL & SURGICAL HISTORY:  Syncopal episodes          SOCIAL HISTORY: Non-smoker, no known illicit drug use        Review of Systems: Due to altered mental status/intubation, subjective information was not able to be obtained from the patient. History was obtained, to the extent possible, from review of the chart and collateral sources of information.        Medications:  piperacillin/tazobactam IVPB.. 3.375 Gram(s) IV Intermittent every 8 hours    DOBUTamine Infusion 2.5 MICROgram(s)/kG/Min IV Continuous <Continuous>  norepinephrine Infusion 0.05 MICROgram(s)/kG/Min IV Continuous <Continuous>      fentaNYL   Infusion. 0.5 MICROgram(s)/kG/Hr IV Continuous <Continuous>  propofol Infusion 20 MICROgram(s)/kG/Min IV Continuous <Continuous>      heparin   Injectable 5000 Unit(s) SubCutaneous every 12 hours    pantoprazole  Injectable 40 milliGRAM(s) IV Push daily        sodium chloride 0.9% lock flush 10 milliLiter(s) IV Push every 1 hour PRN      artificial  tears Solution 1 Drop(s) Both EYES every 12 hours  chlorhexidine 0.12% Liquid 15 milliLiter(s) Oral Mucosa every 12 hours  chlorhexidine 2% Cloths 1 Application(s) Topical daily  chlorhexidine 4% Liquid 1 Application(s) Topical <User Schedule>        Mode: AC/ CMV (Assist Control/ Continuous Mandatory Ventilation)  RR (machine): 22  TV (machine): 400  FiO2: 30  PEEP: 6  ITime: 1  MAP: 9  PIP: 18      ICU Vital Signs Last 24 Hrs  T(C): 36.5 (29 Aug 2024 23:00), Max: 37.2 (29 Aug 2024 14:15)  T(F): 97.7 (29 Aug 2024 23:00), Max: 99 (29 Aug 2024 14:15)  HR: 92 (29 Aug 2024 23:33) (39 - 122)  BP: 137/72 (29 Aug 2024 23:00) (81/45 - 142/95)  BP(mean): 86 (29 Aug 2024 23:00) (61 - 102)  ABP: 115/63 (29 Aug 2024 23:15) (82/51 - 121/67)  ABP(mean): 81 (29 Aug 2024 23:15) (-13 - 85)  RR: 18 (29 Aug 2024 23:15) (13 - 24)  SpO2: 98% (29 Aug 2024 23:33) (95% - 100%)    O2 Parameters below as of 29 Aug 2024 20:00  Patient On (Oxygen Delivery Method): ventilator    O2 Concentration (%): 40        ABG - ( 29 Aug 2024 22:06 )  pH, Arterial: 7.300 pH, Blood: x     /  pCO2: 47    /  pO2: 146   / HCO3: 23    / Base Excess: -3.3  /  SaO2: 100.0               I&O's Detail    29 Aug 2024 07:01  -  29 Aug 2024 23:49  --------------------------------------------------------  IN:    DOBUTamine: 24 mL    FentaNYL: 57.6 mL    IV PiggyBack: 200 mL    IV PiggyBack: 50 mL    IV PiggyBack: 50 mL    Lactated Ringers Bolus: 1000 mL    Norepinephrine: 252 mL    Propofol: 126.6 mL  Total IN: 1760.2 mL    OUT:    Indwelling Catheter - Urethral (mL): 640 mL  Total OUT: 640 mL    Total NET: 1120.2 mL            LABS:                        13.7   17.55 )-----------( 248      ( 29 Aug 2024 17:41 )             40.2     08-29    137  |  104  |  17.4  ----------------------------<  104<H>  4.7   |  20.0<L>  |  0.51    Ca    7.9<L>      29 Aug 2024 22:00  Phos  3.5     08-29  Mg     2.5     08-29    TPro  6.5<L>  /  Alb  3.7  /  TBili  0.4  /  DBili  x   /  AST  128<H>  /  ALT  63<H>  /  AlkPhos  73  08-29      CARDIAC MARKERS ( 29 Aug 2024 22:00 )  x     / x     / x     / x     / 66.1 ng/mL      CAPILLARY BLOOD GLUCOSE      POCT Blood Glucose.: 167 mg/dL (29 Aug 2024 12:40)    PT/INR - ( 29 Aug 2024 12:51 )   PT: 11.2 sec;   INR: 1.01 ratio         PTT - ( 29 Aug 2024 12:51 )  PTT:28.7 sec  Urinalysis Basic - ( 29 Aug 2024 22:00 )    Color: x / Appearance: x / SG: x / pH: x  Gluc: 104 mg/dL / Ketone: x  / Bili: x / Urobili: x   Blood: x / Protein: x / Nitrite: x   Leuk Esterase: x / RBC: x / WBC x   Sq Epi: x / Non Sq Epi: x / Bacteria: x      CULTURES:            Physical Examination:    General: No acute distress.      PULM: Clear to auscultation bilaterally.    CVS: Regular rate and rhythm, no murmurs.    ABD: Soft, nondistended, nontender, normoactive bowel sounds.    EXT: No edema, nontender    SKIN: Warm and well perfused.    NEURO: Deeply sedated, no spontaneous movements         RADIOLOGY: < from: CT Head No Cont (08.29.24 @ 14:28) >  IMPRESSION:    CT HEAD:    No acute intracranial hemorrhage, mass effect or calvarial fracture.    CT CERVICAL SPINE:    No acute cervical spine fracture or traumatic subluxation.    Reversal cervical lordosis may relate to positioning or muscle spasm.    --- End of Report ---    NANY RICO MD; Attending Radiologist  This document has been electronically signed. Aug 29 2024  2:37PM        < from: CT Angio Chest PE Protocol w/ IV Cont (08.29.24 @ 14:32) >  IMPRESSION:  No pulmonary embolism through branches. Limited evaluation of the   subsegmental branches due to suboptimal contrast bolus timing.    Left upper lobe confluent groundglass opacities and left lower lobe   peribronchovascular groundglass opacities and small a small   consolidations. In acute setting, finding may be suggestive of aspiration   pneumonitis or alveolar hemorrhage in left upper lobe.  Enlarged bilateral ventricles. Reflux of contrast into the hepatic veins.   Findings are nonspecific, but may be related to cardiac dysfunction.   Consider echocardiogram for further evaluation.    --- End of Report ---    NAVYA MONSIVAIS MD; Resident Radiologist  This document has been electronically signed.  ANABELA WYATT MD; Attending Radiologist  This document has been electronically signed. Aug 29 2024  3:55PM            INVASIVE LINES: R IJ TLC  INDWELLING GUNTER: Y  VTE PROPHYLAXIS: Heparin SC   CAM ICU: N/A  CODE STATUS: FULL        CRITICAL CARE TIME SPENT: 30 minutes spent performing frequent bedside reassessments and augmenting plan of care to address problems of acute critical illness that pose high probability of life threatening deterioration and/or end organ damage/dysfunction and discussing goals of care, non-inclusive of time spent on procedures performed. Date of note entry is equal to date of services rendered.

## 2024-08-29 NOTE — PATIENT PROFILE ADULT - FALL HARM RISK - HARM RISK INTERVENTIONS

## 2024-08-29 NOTE — ED ADULT NURSE REASSESSMENT NOTE - NS ED NURSE REASSESS COMMENT FT1
pt remains intubated with unchanged vent settings. sinus sonia on cardiac monitor at this time. velasquez cath in place draining minimal urine, levo gtt infusing as well as propofol gtt, fentanyl gtt initiated as well. IV suites patent, defib pads remain in place. pt to be taken from CT scan to MICU.
room air

## 2024-08-29 NOTE — H&P ADULT - ASSESSMENT
NANY RODRIGES is a 23y Female with PMHx multiple prior syncopal episodes with unremarkable cardio work up presents after witnessed cardiac arrest. Now, stable, in sinus rhythm, normotensive on levo, intubated, sedated with propofol, withdrawing to painful stimuli.     PLAN:  #Cardiac Arrest  #Ventricular Arrythmia   #Hypotension  #Bradycardia      Neuro:  - Light Sedation with Fentanyl, RASS goal 0/-2  - Follow up CT Head  - Artic son to avoid fevers  - Repeat CT Head at 72 hours  - Continuous video EEG 24 hours      CVS:  - Titrate Levophed Maintain MAP>65  - Cardiac monitor  - Repeat EKG on admission   - Follow up TTE  - CTA evaluate for PE  - Follows Dr. Soliman at [facility], cardiology following       Resp:  - Intubated 10 PEEP, , RR 18 FiO2 40,   - Daily SBT  - CXR: Left lung infiltrates, possible aspiration during resuscitation   - Follow up VBG  - continue to monitor on   - Aspiration precautions    GI:  - Follow up CT abdomen/pelvis  - Protonix for GI ppx  - Mildly elevated LFTs possibly due to hypoperfusion of liver during arrest, continue to monitor  - NPO    Renal:  - Daily Labs  - Strict I/Os  - Replete lytes as needed    ID:  Afebrile  Follow up Blood cultures   - Empiric treatment Zosyn  - Send UA, UCx  - Follow up RVP    Heme:  Mildy elevated WBC continue to trend  H/H normal  Coags wnl  - Follow up  DVT ppx SQ heparin    Endo:  - Daily Finger Sticks  - Follow up TFTs    Code Status: Full Code  Dispo: Admit to MICU    Case discussed with MICU Attending: Dr. Alicea

## 2024-08-29 NOTE — PROGRESS NOTE ADULT - ASSESSMENT
23 year old PMHx multiple syncopal episodes during strenuous exercise with previously negative cardiac workup, who presented following witnessed syncopal episode followed by cardiac arrest while running.     # Cardiac arrest  # VT  # Distributive vs Cardiogenic shock  # Acute hypoxic hypercapnic respiratory failure  # Acute kidney injury   # Transaminitis    Neuro: sedated on Propofol and Fentanyl gtts. On cvEEG monitoring, undergoing TTM. Initial CT head negative. Will plan for SAT to assess for neurologic function in AM.  Pulm: On full vent support, RR increased to compensate for respiratory acidosis. F/U repeat ABG.  CV: Uncertain etiology of cardiac arrest, had previously negative cardiac workup with negative holter monitor for arrhythmias and TTE normal LVEF and no significant valvular heart disease. No significant electrolyte derangements on presentation. Now in distributive vs dardiogenic shock, POCUS concerning for acute systolic heart failure. Official TTE to eval cardiac structures and function pending. End organ perfusion markers incl renal function and UOP improving on dobutamine.   GI: PPI for GI ppx. Transaminitis suspected ischemic hepatopathy, trending LFTs  Renal: CASSIUS likely ATN due to hypoperfusion during cardiac arrest/shock state, improving. UOP improving with initiation of inotropic support, as above. Monitoring serial labs and supplementing electrolytes to keep K >4, Mg >2 for optimal arrhythmia suppression.  ID: Empiric antibiotics with Zosyn for multifocal pneumonia, probable aspiration. Cx pending.  Heme: Heparin SC for DVT ppx  Endo: Keep targeted -200 while critically ill. Avoid hyperglycemia post-arrest to improve odds of neurologic recovery.    Family updated at bedside.

## 2024-08-29 NOTE — ED PROVIDER NOTE - OBJECTIVE STATEMENT
23-year-old female with history of syncopal episodes while running, negative cardiac workup outpatient with [facility] presents the ED status post cardiac arrest, ROSC achieved by EMS.  As per EMS, patient was running, around mile 8 had witnessed episode of falling, EMS was called, patient was found to be in PEA, CPR was initiated with 1 dose of epinephrine given, patient then entered ventricular tachycardia, with a shock given, 2 more rounds of CPR were performed and ROSC was achieved.  Upon arrival to the ED, patient with agonal respirations, GCS of 3, noted to be bradycardic to the low 40s, 0.5 mg atropine given.  Patient entered junctional/normal sinus rhythm at that time, intubated for airway protection successfully.

## 2024-08-29 NOTE — ED ADULT TRIAGE NOTE - CHIEF COMPLAINT QUOTE
as per EMS pt was running and collapsed with arrest given 1 epi 1 shock with vtach. pt arrives with ROSC

## 2024-08-29 NOTE — ED PROVIDER NOTE - ATTENDING CONTRIBUTION TO CARE
Pt was out for a run today and at approx. mile 8 had a witnessed collapsed. CPR started immediately.  EMS found patient PEA on arrival. Pt had IO placed and was given epi. Pt converted to VT and was shocked and had ROSC. Pt being bagged on arrival.  Pt became bradycardic in the ED to high 30s and responded to Atropine 0.5 mg.  bp initially hypotensive and was continued on levophed and given phenylephrine with improvement in BP.  intubated without complication for airway protection given poor mental status.  Case d/w Dr. Anders, cards attending, who reports patient had an outpatient exercise stress test, holter monitor, and TTE and everything was normal. Dr. Anders to see patient in hospital.  MICU consulted. case d/w dr. fulton, critical care attending, who will admit for further management.

## 2024-08-29 NOTE — ED ADULT NURSE NOTE - OBJECTIVE STATEMENT
pt BIBA s/p cardiac arrest, found in PEA arrest by EMS after running, witness repors mile 8 she passed out. pt is unresponsive and showing sinus sonia on cardiac monitor on arrival, BVM in place for assisted ventilation. pt shocked once en route by EMS for pulseless V-Tach, given 1mg Epinephrine IO and 3 rounds of CPR in total. pt presenting to ED with inability to maintain her airway, ER MD intubated immediately on arrival. pt with syncopal episodes during workouts x 2 years, f/u with cards and wore halter with no noted ectopy as per family. with significant cardiac hx in the family as well.

## 2024-08-29 NOTE — ED ADULT NURSE NOTE - SUICIDE SCREENING QUESTION 1
Patient unable to complete Calcipotriene Pregnancy And Lactation Text: This medication has not been proven safe during pregnancy. It is unknown if this medication is excreted in breast milk.

## 2024-08-29 NOTE — H&P ADULT - HISTORY OF PRESENT ILLNESS
23 year old PMHx multiple syncopal episodes during strenuous exercise presents with witnessed syncopal episode. Patient was running with her boyfriend, on mile 8 patient sat down on a bench and syncopated. Patients boyfriend states patient was unresponsive, trialed leg lift with no improvement, states unsure if patient had a pulse. Called EMS, who arrived to the scene 5 mins, boyfriend did not provide CPR while awaiting EMS. Patient was found PEA, 3 rounds of CPR, 1 epi in the field, found to be in VTach and defibrillated. Upon arrival to ED, sinus bradycardia in 30s, atropine was given with improvement in HR, patient intubated for airway protection.    Of note, patient with multiple syncopal episodes during exertion such a running a 5K. Patient is an avid running and frequently runs more than 8-10 miles. Follows with Dr. Soliman, [facility], syncopal work up included 1 month with mobile cardiac monitor, TTE in April 2023, found to be unremarkable.  23 year old PMHx multiple syncopal episodes during strenuous exercise presents with witnessed syncopal episode/cardiac arrest. Patient was running with her boyfriend, on mile 8 patient sat down on a bench and syncopated. Patients boyfriend states patient was unresponsive, trialed leg lift with no improvement, states unsure if patient had a pulse. Called EMS, who arrived to the scene 5 mins, boyfriend did not provide CPR while awaiting EMS. Patient was found PEA, 3 rounds of CPR, 1 epi in the field, found to be in VTach and defibrillated. Upon arrival to ED, sinus bradycardia in 30s, atropine was given with improvement in HR, patient intubated for airway protection.    Of note, patient with multiple syncopal episodes during exertion such a running a 5K. Patient is an avid running and frequently runs more than 8-10 miles. Follows with Dr. Soliman, [facility], syncopal work up included 1 month with mobile cardiac monitor, TTE in April 2023, found to be unremarkable.  23 year old PMHx multiple syncopal episodes during strenuous exercise presents with witnessed syncopal episode/cardiac arrest. Patient was running with her boyfriend, on mile 8 patient sat down on a bench and syncopated. Patients boyfriend states patient was unresponsive, tried elevating legs with no improvement, states unsure if patient had a pulse. Called EMS, who arrived to the scene 5 mins, boyfriend did not provide CPR while awaiting EMS. Patient was found PEA, 3 rounds of CPR, 1 epi in the field, found to be in VTach and defibrillated. Upon arrival to ED, sinus bradycardia in 30s, atropine was given with improvement in HR, patient intubated for airway protection.    Of note, patient with multiple syncopal episodes during exertion such a running a 5K. Patient is an avid running and frequently runs more than 8-10 miles. Follows with Dr. Soliman, [facility], syncopal work up included 1 month with mobile cardiac monitor, TTE in April 2023, found to be unremarkable.

## 2024-08-29 NOTE — H&P ADULT - ATTENDING COMMENTS
22 yo female active runner with hx of unexplained syncope and extensive cardiac workup, presented with out of hospital cardiac arrest.  Patient was running, on her 8th mile, when she suddenly collapsed which was witnessed by her boyfriend.  EMS initially found the patient in PEA and later found VT which they defibrillated, obtained rosc in the field, patient intubated in the ED.  In the ED patient was unresponsive but moving all extremities, not following commands but withdrawing to pain.  Placed on propofol for sedation.   Plan is for pressors to support BP, may also need inotrope as there appears to be some global hypokinesis on bedside echo, however her lactate was normal.  Will start abx for aspiration pneumonitis seen on CT.  EEG and CT brain.  Temp control with arctic sun.  Family updated.

## 2024-08-29 NOTE — H&P ADULT - NSHPSOURCEINFORD_GEN_ALL_CORE
Chart(s)/Spouse/Significant Other/Mother/Father/Other Family Member/Friend/Colleague/Physician/Provider

## 2024-08-29 NOTE — ED PROCEDURE NOTE - ATTENDING CONTRIBUTION TO CARE
Dr. Cedeno: I personally supervised this procedure.
Dr. Cedeno: I personally supervised this procedure.

## 2024-08-29 NOTE — H&P ADULT - NSHPPHYSICALEXAM_GEN_ALL_CORE
Gen: Sedated and intubated  HENT: Atraumatic, normocephalic, neck is supple without adenopathy, no JVD  Eyes: PERRLA, conjunctivae are clear, non-icteric sclera  NEURO: + gag reflex, corneal reflex, withdrawing to painful stimuli  Resp: CTAB, no wrr, non-labored breathing  Cardio: Regular rate and rhythm, S1/S2 heard, no murmurs, gallops or rubs  Abdominal: Soft, non-tender, non-distended, no appreciable masses, normoactive bowel sounds  : No CVA tenderness  Extremities: Nontender, no clubbing, cyanosis, or edema  Skin: Warm, dry, intact without rashes or lesions

## 2024-08-29 NOTE — PROGRESS NOTE ADULT - SUBJECTIVE AND OBJECTIVE BOX
Patient seen and examined. Leila Kirkpatrick is a 23 year old female with history of intermittent RBBB, near syncope after intense exercise in the past, negative TTT who presented with VT arrest now on hypothermia protocol. Prior Holter negative for arrhythmia, she achieved 17 METS on treadmill stress test without evidence of arrhythmia, and echo was normal in the past.    - Agree with ischemic eval, rule out anomalous cors  - Should also have a cardiac MRI  - If above negative, outpatient genetic testing  - Eventual ICD prior to discharge for secondary prevention. Given young age, would consider EV ICD

## 2024-08-29 NOTE — ED PROVIDER NOTE - NS ED ATTENDING STATEMENT MOD
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 Plan: OCT of macula ordered and performed today ou. normal macula ou. ocular health normal ou. Okay to continue Plaquenil.  f/u 1 year I have personally provided the amount of critical care time documented below concurrently with the resident/fellow.  This time excludes time spent on separate procedures and time spent teaching. I have reviewed the resident’s / fellow’s documentation and I agree with the history, exam, and assessment and plan of care.

## 2024-08-30 LAB
ALBUMIN SERPL ELPH-MCNC: 3.6 G/DL — SIGNIFICANT CHANGE UP (ref 3.3–5.2)
ALBUMIN SERPL ELPH-MCNC: 3.8 G/DL — SIGNIFICANT CHANGE UP (ref 3.3–5.2)
ALP SERPL-CCNC: 73 U/L — SIGNIFICANT CHANGE UP (ref 40–120)
ALP SERPL-CCNC: 73 U/L — SIGNIFICANT CHANGE UP (ref 40–120)
ALT FLD-CCNC: 63 U/L — HIGH
ALT FLD-CCNC: 67 U/L — HIGH
ANION GAP SERPL CALC-SCNC: 14 MMOL/L — SIGNIFICANT CHANGE UP (ref 5–17)
ANION GAP SERPL CALC-SCNC: 16 MMOL/L — SIGNIFICANT CHANGE UP (ref 5–17)
AST SERPL-CCNC: 150 U/L — HIGH
AST SERPL-CCNC: 166 U/L — HIGH
BASOPHILS # BLD AUTO: 0.03 K/UL — SIGNIFICANT CHANGE UP (ref 0–0.2)
BASOPHILS NFR BLD AUTO: 0.2 % — SIGNIFICANT CHANGE UP (ref 0–2)
BILIRUB SERPL-MCNC: 0.5 MG/DL — SIGNIFICANT CHANGE UP (ref 0.4–2)
BILIRUB SERPL-MCNC: 0.7 MG/DL — SIGNIFICANT CHANGE UP (ref 0.4–2)
BUN SERPL-MCNC: 11.7 MG/DL — SIGNIFICANT CHANGE UP (ref 8–20)
BUN SERPL-MCNC: 13 MG/DL — SIGNIFICANT CHANGE UP (ref 8–20)
CALCIUM SERPL-MCNC: 8.3 MG/DL — LOW (ref 8.4–10.5)
CALCIUM SERPL-MCNC: 8.8 MG/DL — SIGNIFICANT CHANGE UP (ref 8.4–10.5)
CHLORIDE SERPL-SCNC: 101 MMOL/L — SIGNIFICANT CHANGE UP (ref 96–108)
CHLORIDE SERPL-SCNC: 103 MMOL/L — SIGNIFICANT CHANGE UP (ref 96–108)
CK MB CFR SERPL CALC: 95.1 NG/ML — HIGH (ref 0–6.7)
CK SERPL-CCNC: 1332 U/L — HIGH (ref 25–170)
CK SERPL-CCNC: 2638 U/L — HIGH (ref 25–170)
CO2 SERPL-SCNC: 18 MMOL/L — LOW (ref 22–29)
CO2 SERPL-SCNC: 19 MMOL/L — LOW (ref 22–29)
CREAT SERPL-MCNC: 0.37 MG/DL — LOW (ref 0.5–1.3)
CREAT SERPL-MCNC: 0.46 MG/DL — LOW (ref 0.5–1.3)
EGFR: 139 ML/MIN/1.73M2 — SIGNIFICANT CHANGE UP
EGFR: 146 ML/MIN/1.73M2 — SIGNIFICANT CHANGE UP
EOSINOPHIL # BLD AUTO: 0.01 K/UL — SIGNIFICANT CHANGE UP (ref 0–0.5)
EOSINOPHIL NFR BLD AUTO: 0.1 % — SIGNIFICANT CHANGE UP (ref 0–6)
GAS PNL BLDA: SIGNIFICANT CHANGE UP
GAS PNL BLDA: SIGNIFICANT CHANGE UP
GLUCOSE SERPL-MCNC: 104 MG/DL — HIGH (ref 70–99)
GLUCOSE SERPL-MCNC: 134 MG/DL — HIGH (ref 70–99)
HCT VFR BLD CALC: 41.3 % — SIGNIFICANT CHANGE UP (ref 34.5–45)
HCT VFR BLD CALC: 41.8 % — SIGNIFICANT CHANGE UP (ref 34.5–45)
HGB BLD-MCNC: 14.1 G/DL — SIGNIFICANT CHANGE UP (ref 11.5–15.5)
HGB BLD-MCNC: 14.4 G/DL — SIGNIFICANT CHANGE UP (ref 11.5–15.5)
IMM GRANULOCYTES NFR BLD AUTO: 0.4 % — SIGNIFICANT CHANGE UP (ref 0–0.9)
LACTATE SERPL-SCNC: 1.6 MMOL/L — SIGNIFICANT CHANGE UP (ref 0.5–2)
LYMPHOCYTES # BLD AUTO: 1.47 K/UL — SIGNIFICANT CHANGE UP (ref 1–3.3)
LYMPHOCYTES # BLD AUTO: 7.6 % — LOW (ref 13–44)
MAGNESIUM SERPL-MCNC: 1.9 MG/DL — SIGNIFICANT CHANGE UP (ref 1.6–2.6)
MAGNESIUM SERPL-MCNC: 1.9 MG/DL — SIGNIFICANT CHANGE UP (ref 1.6–2.6)
MCHC RBC-ENTMCNC: 31.8 PG — SIGNIFICANT CHANGE UP (ref 27–34)
MCHC RBC-ENTMCNC: 31.9 PG — SIGNIFICANT CHANGE UP (ref 27–34)
MCHC RBC-ENTMCNC: 34.1 GM/DL — SIGNIFICANT CHANGE UP (ref 32–36)
MCHC RBC-ENTMCNC: 34.4 GM/DL — SIGNIFICANT CHANGE UP (ref 32–36)
MCV RBC AUTO: 92.7 FL — SIGNIFICANT CHANGE UP (ref 80–100)
MCV RBC AUTO: 93.2 FL — SIGNIFICANT CHANGE UP (ref 80–100)
MONOCYTES # BLD AUTO: 1.39 K/UL — HIGH (ref 0–0.9)
MONOCYTES NFR BLD AUTO: 7.2 % — SIGNIFICANT CHANGE UP (ref 2–14)
NEUTROPHILS # BLD AUTO: 16.43 K/UL — HIGH (ref 1.8–7.4)
NEUTROPHILS NFR BLD AUTO: 84.5 % — HIGH (ref 43–77)
PCP SPEC-MCNC: SIGNIFICANT CHANGE UP
PHOSPHATE SERPL-MCNC: 2.9 MG/DL — SIGNIFICANT CHANGE UP (ref 2.4–4.7)
PHOSPHATE SERPL-MCNC: 3.1 MG/DL — SIGNIFICANT CHANGE UP (ref 2.4–4.7)
PLATELET # BLD AUTO: 203 K/UL — SIGNIFICANT CHANGE UP (ref 150–400)
PLATELET # BLD AUTO: 220 K/UL — SIGNIFICANT CHANGE UP (ref 150–400)
POTASSIUM SERPL-MCNC: 3.8 MMOL/L — SIGNIFICANT CHANGE UP (ref 3.5–5.3)
POTASSIUM SERPL-MCNC: 4.4 MMOL/L — SIGNIFICANT CHANGE UP (ref 3.5–5.3)
POTASSIUM SERPL-SCNC: 3.8 MMOL/L — SIGNIFICANT CHANGE UP (ref 3.5–5.3)
POTASSIUM SERPL-SCNC: 4.4 MMOL/L — SIGNIFICANT CHANGE UP (ref 3.5–5.3)
PROT SERPL-MCNC: 6.3 G/DL — LOW (ref 6.6–8.7)
PROT SERPL-MCNC: 6.5 G/DL — LOW (ref 6.6–8.7)
RBC # BLD: 4.43 M/UL — SIGNIFICANT CHANGE UP (ref 3.8–5.2)
RBC # BLD: 4.51 M/UL — SIGNIFICANT CHANGE UP (ref 3.8–5.2)
RBC # FLD: 12.4 % — SIGNIFICANT CHANGE UP (ref 10.3–14.5)
RBC # FLD: 12.4 % — SIGNIFICANT CHANGE UP (ref 10.3–14.5)
SODIUM SERPL-SCNC: 135 MMOL/L — SIGNIFICANT CHANGE UP (ref 135–145)
SODIUM SERPL-SCNC: 136 MMOL/L — SIGNIFICANT CHANGE UP (ref 135–145)
T3FREE SERPL-MCNC: 2.25 PG/ML — SIGNIFICANT CHANGE UP (ref 2–4.4)
T4 FREE SERPL-MCNC: 1.6 NG/DL — SIGNIFICANT CHANGE UP (ref 0.9–1.8)
TROPONIN T, HIGH SENSITIVITY RESULT: 921 NG/L — HIGH (ref 0–51)
TSH SERPL-MCNC: 1.46 UIU/ML — SIGNIFICANT CHANGE UP (ref 0.27–4.2)
WBC # BLD: 17.5 K/UL — HIGH (ref 3.8–10.5)
WBC # BLD: 19.41 K/UL — HIGH (ref 3.8–10.5)
WBC # FLD AUTO: 17.5 K/UL — HIGH (ref 3.8–10.5)
WBC # FLD AUTO: 19.41 K/UL — HIGH (ref 3.8–10.5)

## 2024-08-30 PROCEDURE — 93010 ELECTROCARDIOGRAM REPORT: CPT

## 2024-08-30 PROCEDURE — 99291 CRITICAL CARE FIRST HOUR: CPT | Mod: GC

## 2024-08-30 PROCEDURE — 71045 X-RAY EXAM CHEST 1 VIEW: CPT | Mod: 26

## 2024-08-30 PROCEDURE — 95720 EEG PHY/QHP EA INCR W/VEEG: CPT

## 2024-08-30 RX ORDER — ONDANSETRON 2 MG/ML
4 INJECTION, SOLUTION INTRAMUSCULAR; INTRAVENOUS ONCE
Refills: 0 | Status: COMPLETED | OUTPATIENT
Start: 2024-08-30 | End: 2024-08-30

## 2024-08-30 RX ORDER — MIDAZOLAM HYDROCHLORIDE 5 MG/ML
2 INJECTION, SOLUTION INTRAMUSCULAR; INTRAVENOUS ONCE
Refills: 0 | Status: DISCONTINUED | OUTPATIENT
Start: 2024-08-30 | End: 2024-08-30

## 2024-08-30 RX ORDER — MIDAZOLAM HYDROCHLORIDE 5 MG/ML
1 INJECTION, SOLUTION INTRAMUSCULAR; INTRAVENOUS ONCE
Refills: 0 | Status: DISCONTINUED | OUTPATIENT
Start: 2024-08-30 | End: 2024-08-30

## 2024-08-30 RX ORDER — METOCLOPRAMIDE HCL 5 MG
10 TABLET ORAL ONCE
Refills: 0 | Status: COMPLETED | OUTPATIENT
Start: 2024-08-30 | End: 2024-08-30

## 2024-08-30 RX ORDER — ONDANSETRON 2 MG/ML
4 INJECTION, SOLUTION INTRAMUSCULAR; INTRAVENOUS ONCE
Refills: 0 | Status: DISCONTINUED | OUTPATIENT
Start: 2024-08-30 | End: 2024-08-30

## 2024-08-30 RX ADMIN — Medication 40 MILLIGRAM(S): at 12:26

## 2024-08-30 RX ADMIN — Medication 5.53 MICROGRAM(S)/KG/MIN: at 03:44

## 2024-08-30 RX ADMIN — Medication 2.5 MILLIGRAM(S): at 17:45

## 2024-08-30 RX ADMIN — Medication 250 MILLILITER(S): at 12:27

## 2024-08-30 RX ADMIN — ONDANSETRON 4 MILLIGRAM(S): 2 INJECTION, SOLUTION INTRAMUSCULAR; INTRAVENOUS at 17:30

## 2024-08-30 RX ADMIN — Medication 5000 UNIT(S): at 16:46

## 2024-08-30 RX ADMIN — MIDAZOLAM HYDROCHLORIDE 2 MILLIGRAM(S): 5 INJECTION, SOLUTION INTRAMUSCULAR; INTRAVENOUS at 14:46

## 2024-08-30 RX ADMIN — POVIDONE, PROPYLENE GLYCOL 1 DROP(S): 6.8; 3 LIQUID OPHTHALMIC at 16:45

## 2024-08-30 RX ADMIN — CHLORHEXIDINE GLUCONATE 15 MILLILITER(S): 40 SOLUTION TOPICAL at 16:49

## 2024-08-30 RX ADMIN — PIPERACILLIN SODIUM AND TAZOBACTAM SODIUM 25 GRAM(S): 3; .375 INJECTION, POWDER, FOR SOLUTION INTRAVENOUS at 21:03

## 2024-08-30 RX ADMIN — FENTANYL CITRATE 2.95 MICROGRAM(S)/KG/HR: 50 INJECTION INTRAMUSCULAR; INTRAVENOUS at 16:48

## 2024-08-30 RX ADMIN — ONDANSETRON 4 MILLIGRAM(S): 2 INJECTION, SOLUTION INTRAMUSCULAR; INTRAVENOUS at 22:40

## 2024-08-30 RX ADMIN — CHLORHEXIDINE GLUCONATE 1 APPLICATION(S): 40 SOLUTION TOPICAL at 14:00

## 2024-08-30 RX ADMIN — POVIDONE, PROPYLENE GLYCOL 1 DROP(S): 6.8; 3 LIQUID OPHTHALMIC at 05:08

## 2024-08-30 RX ADMIN — PROPOFOL 7.08 MICROGRAM(S)/KG/MIN: 10 INJECTION, EMULSION INTRAVENOUS at 01:19

## 2024-08-30 RX ADMIN — CHLORHEXIDINE GLUCONATE 15 MILLILITER(S): 40 SOLUTION TOPICAL at 05:07

## 2024-08-30 RX ADMIN — Medication 5000 UNIT(S): at 05:07

## 2024-08-30 RX ADMIN — PIPERACILLIN SODIUM AND TAZOBACTAM SODIUM 25 GRAM(S): 3; .375 INJECTION, POWDER, FOR SOLUTION INTRAVENOUS at 05:06

## 2024-08-30 RX ADMIN — Medication 10 MILLIGRAM(S): at 23:29

## 2024-08-30 RX ADMIN — CHLORHEXIDINE GLUCONATE 1 APPLICATION(S): 40 SOLUTION TOPICAL at 05:12

## 2024-08-30 RX ADMIN — PIPERACILLIN SODIUM AND TAZOBACTAM SODIUM 25 GRAM(S): 3; .375 INJECTION, POWDER, FOR SOLUTION INTRAVENOUS at 13:58

## 2024-08-30 RX ADMIN — MIDAZOLAM HYDROCHLORIDE 1 MILLIGRAM(S): 5 INJECTION, SOLUTION INTRAMUSCULAR; INTRAVENOUS at 19:51

## 2024-08-30 NOTE — EEG REPORT - NS EEG TEXT BOX
NANY RODRIGES [facility]-[id number]     Study Date: 08-29-24 17:32 - 08:00 08-29-24   Duration x Hours: 14 hours 2 minutes  --------------------------------------------------------------------------------------------------  History:  CC/ HPI Patient is a 23y old  Female who presents with a chief complaint of Cardiac Arrest (29 Aug 2024 23:48)    MEDICATIONS  (STANDING):  artificial  tears Solution 1 Drop(s) Both EYES every 12 hours  chlorhexidine 0.12% Liquid 15 milliLiter(s) Oral Mucosa every 12 hours  chlorhexidine 2% Cloths 1 Application(s) Topical daily  DOBUTamine Infusion 2.5 MICROgram(s)/kG/Min (4.8 mL/Hr) IV Continuous <Continuous>  fentaNYL   Infusion. 0.5 MICROgram(s)/kG/Hr (2.95 mL/Hr) IV Continuous <Continuous>  heparin   Injectable 5000 Unit(s) SubCutaneous every 12 hours  norepinephrine Infusion 0.05 MICROgram(s)/kG/Min (5.53 mL/Hr) IV Continuous <Continuous>  pantoprazole  Injectable 40 milliGRAM(s) IV Push daily  piperacillin/tazobactam IVPB.. 3.375 Gram(s) IV Intermittent every 8 hours  propofol Infusion 20 MICROgram(s)/kG/Min (7.08 mL/Hr) IV Continuous <Continuous>    --------------------------------------------------------------------------------------------------  Study Interpretation:    [[[Abbreviation Key:  PDR=alpha rhythm/posterior dominant rhythm. A-P=anterior posterior gradient.  Amplitude: ‘very low’:<20; ‘low’:20-50; ‘medium’:; ‘high’:>200uV.  Persistence for periodic/rhythmic patterns (% of epoch) ‘rare’:<1%; ‘occasional’:1-10%; ‘frequent’:10-50%; ‘abundant’:50-90%; ‘continuous’:>90%.  Persistence for sporadic discharges: ‘rare’:<1/hr; ‘occasional’:1/min-1/hr; ‘frequent’:>1/min; ‘abundant’:>1/10 sec.  GRDA=generalized rhythmic delta activity; FIRDA=frontal intermittent GRDA; LRDA=lateralized rhythmic delta activity; TIRDA=temporal intermittent rhythmic delta activity;  LPD=PLED=lateralized periodic discharges; GPD=generalized periodic discharges; BiPDs=BiPLEDs=bilateral independent periodic epileptiform discharges; SIRPID=stimulus induced rhythmic, periodic, or ictal appearing discharges; BIRDs=brief potentially ictal rhythmic discharges >4 Hz, lasting .5-10s; PFA=paroxysmal bursts of beta/gamma; LVFA=low voltage fast activity.  Modifiers: +F=with fast component; +S=with spike component; +R=with rhythmic component.  S-B=burst suppression pattern.  Max=maximal. N1-drowsy; N2-stage II sleep; N3-slow wave sleep. SSS/BETS=small sharp spikes/benign epileptiform transients of sleep. HV=hyperventilation; PS=photic stimulation]]]    Daily EEG Visual Analysis    FINDINGS:      Background: There is significant myogenic and environmental artifact throughout the majority of the record that limits interpretation of the study. Within these limitations, the earlier part of the recording is suppressed/attenuated, with no clear symmetry, reactivity, anterior posterior gradient or posterior dominant rhythm discerned. The later portion of the recording is characterized by diffuse and continuous high amplitude delta activity with intermixed irregular theta.    State Changes: Drowsiness and N2 sleep transients were not observed    Sporadic Epileptiform Discharges:  None    Rhythmic and Periodic Patterns (RPPs): None     Electrographic and Electroclinical seizures: None    Other Clinical Events: There were 2 patient event button pushes with no clear video correlate as the patient was intubated with no clear sign of movement.     Activation Procedures:   -Hyperventilation was not performed.    -Photic stimulation was not performed.    Artifacts:  Significant continuous myogenic artifacts were noted.    ECG: Regular rhythm    EEG Classification / Summary:  Abnormal  EEG in an intubated patient  1. Voltage suppression/attenuation early in the recording, followed by more continuous delta and theta slowing later in the recording.  2. Significant artifact limiting interpretation of the study    Clinical Impression:   Abnormal EEG  1. Moderate to severe cerebral dysfunction which is nonspecific. Voltage suppression can be seen in the setting of sedative effect as well as diffuse anoxic injury.   2. There were no epileptiform abnormalities recorded.      THIS IS A PRELIMINARY INTERPRETATION ONLY PENDING ATTENDING REVIEW/ATTESTATION    David Newman DO  Epilepsy Fellow, PGY-5    -------------------------------------------------------------------------------------------------------  [facility] EEG Reading Room Ph#: [phone]  Epilepsy Answering Service after 5PM and before 8:30AM: Ph#: [phone]       NANY RODRIEGS [facility]-[id number]     Study Date: 08-29-24 17:42 - 08-29-24 08:00  Duration: 14 hours 2 minutes  --------------------------------------------------------------------------------------------------  History:  CC/ HPI Patient is a 23y old  Female who presents with a chief complaint of Cardiac Arrest (29 Aug 2024 23:48)    MEDICATIONS  (STANDING):  artificial  tears Solution 1 Drop(s) Both EYES every 12 hours  chlorhexidine 0.12% Liquid 15 milliLiter(s) Oral Mucosa every 12 hours  chlorhexidine 2% Cloths 1 Application(s) Topical daily  DOBUTamine Infusion 2.5 MICROgram(s)/kG/Min (4.8 mL/Hr) IV Continuous <Continuous>  fentaNYL   Infusion. 0.5 MICROgram(s)/kG/Hr (2.95 mL/Hr) IV Continuous <Continuous>  heparin   Injectable 5000 Unit(s) SubCutaneous every 12 hours  norepinephrine Infusion 0.05 MICROgram(s)/kG/Min (5.53 mL/Hr) IV Continuous <Continuous>  pantoprazole  Injectable 40 milliGRAM(s) IV Push daily  piperacillin/tazobactam IVPB.. 3.375 Gram(s) IV Intermittent every 8 hours  propofol Infusion 20 MICROgram(s)/kG/Min (7.08 mL/Hr) IV Continuous <Continuous>    --------------------------------------------------------------------------------------------------  Study Interpretation:    [[[Abbreviation Key:  PDR=alpha rhythm/posterior dominant rhythm. A-P=anterior posterior gradient.  Amplitude: ‘very low’:<20; ‘low’:20-50; ‘medium’:; ‘high’:>200uV.  Persistence for periodic/rhythmic patterns (% of epoch) ‘rare’:<1%; ‘occasional’:1-10%; ‘frequent’:10-50%; ‘abundant’:50-90%; ‘continuous’:>90%.  Persistence for sporadic discharges: ‘rare’:<1/hr; ‘occasional’:1/min-1/hr; ‘frequent’:>1/min; ‘abundant’:>1/10 sec.  GRDA=generalized rhythmic delta activity; FIRDA=frontal intermittent GRDA; LRDA=lateralized rhythmic delta activity; TIRDA=temporal intermittent rhythmic delta activity;  LPD=PLED=lateralized periodic discharges; GPD=generalized periodic discharges; BiPDs=BiPLEDs=bilateral independent periodic epileptiform discharges; SIRPID=stimulus induced rhythmic, periodic, or ictal appearing discharges; BIRDs=brief potentially ictal rhythmic discharges >4 Hz, lasting .5-10s; PFA=paroxysmal bursts of beta/gamma; LVFA=low voltage fast activity.  Modifiers: +F=with fast component; +S=with spike component; +R=with rhythmic component.  S-B=burst suppression pattern.  Max=maximal. N1-drowsy; N2-stage II sleep; N3-slow wave sleep. SSS/BETS=small sharp spikes/benign epileptiform transients of sleep. HV=hyperventilation; PS=photic stimulation]]]    Daily EEG Visual Analysis    FINDINGS:    Background:   There is significant myogenic and environmental artifact throughout the majority of the record that limits interpretation of the study. Within these limitations, the background is symmetric. In the beginning of recording, the background is burst-suppressed, consisting of 2-4-second bursts of diffuse sharply contoured polymorphic theta/delta activity and intermittent alpha, between 7-10-second periods of diffuse attenuation.  Later, the background becomes continuous with gradually increasing prevalence of faster frequencies. By the end of recording, the background consists of diffuse polymorphic theta and delta slowing.    State Changes:   As above.  No normal sleep architecture captured.    Sporadic Epileptiform Discharges:    None    Rhythmic and Periodic Patterns (RPPs):   None     Electrographic and Electroclinical seizures:   None    Other Clinical Events:   Event button presses:  20:21: Intermittent R hand trembling (high frequency, low amplitude). EEG limited by diffuse muscle artifact.   01:54: Intermittent BUE trembling (low amplitude, high frequency). EEG limited by diffuse muscle artifact.    Activation Procedures:   -Hyperventilation not performed.    -Photic stimulation not performed.    Artifacts:  Diffuse muscle artifact significantly limits interpretation for most of recording.    Single-lead EKG: Regular rhythm    EEG Classification / Summary:  Abnormal EEG in an initially comatose and later awake patient.  -Severe improving to moderate diffuse slowing  -Event button presses for R or BUE trembling with EEG limited by artifact.  -No epileptiform abnormalities or seizures within the limits of interpretation.  -Artifacts significantly limit interpretation for most of recording    Clinical Impression:   -Severe improving to moderate diffuse cerebral dysfunction is nonspecific in etiology. In this case, sedating medications with decrease in dose may be contributing.  -Events of R or BUE trembling with EEG unable to be assessed due to artifact during these times. Based on video, appears less likely to be epileptic.  -No epileptiform abnormalities or seizures within the limits of interpretation.  -Artifacts significantly limit interpretation for most of recording      -------------------------------------------------------------------------------------------------------    David Newman DO  Epilepsy Fellow, PGY-5    Bernice Romo MD  Attending Physician, [facility]     [facility] EEG Reading Room Ph#: [phone]  Epilepsy Answering Service after 5PM and before 8:30AM: Ph#: [phone]

## 2024-08-30 NOTE — PROGRESS NOTE ADULT - SUBJECTIVE AND OBJECTIVE BOX
Patient is a 23y old  Female who presents with a chief complaint of Cardiac Arrest (29 Aug 2024 23:48)      BRIEF HOSPITAL COURSE: 23 year old PMHx multiple syncopal episodes during strenuous exercise presents with witnessed syncopal episode/cardiac arrest. Patient was running with her boyfriend, on mile 8 patient sat down on a bench and syncopated. Patients boyfriend states patient was unresponsive, tried elevating legs with no improvement, states unsure if patient had a pulse. Called EMS, who arrived to the scene 5 mins, boyfriend did not provide CPR while awaiting EMS. Patient was found PEA, 3 rounds of CPR, 1 epi in the field, found to be in VTach and defibrillated. Upon arrival to ED, sinus bradycardia in 30s, atropine was given with improvement in HR, patient intubated for airway protection.    Interval HPI: No acute events overnight. TTE completed yesterday. Complete 24 hours total of continuous EEG. Trial off sedition completed 0700 hours in AM, erratic movements, not following commands. Cardiac US bedside shows apical hypokinesis, possible mildly reduced EF.    PAST MEDICAL & SURGICAL HISTORY:  Syncopal episodes      Review of Systems:  Unable to obtain.    Medications:  piperacillin/tazobactam IVPB.. 3.375 Gram(s) IV Intermittent every 8 hours    DOBUTamine Infusion 2.5 MICROgram(s)/kG/Min IV Continuous <Continuous>  norepinephrine Infusion 0.05 MICROgram(s)/kG/Min IV Continuous <Continuous>      fentaNYL   Infusion. 0.5 MICROgram(s)/kG/Hr IV Continuous <Continuous>  propofol Infusion 20 MICROgram(s)/kG/Min IV Continuous <Continuous>      heparin   Injectable 5000 Unit(s) SubCutaneous every 12 hours    pantoprazole  Injectable 40 milliGRAM(s) IV Push daily        sodium chloride 0.9% lock flush 10 milliLiter(s) IV Push every 1 hour PRN      artificial  tears Solution 1 Drop(s) Both EYES every 12 hours  chlorhexidine 0.12% Liquid 15 milliLiter(s) Oral Mucosa every 12 hours  chlorhexidine 2% Cloths 1 Application(s) Topical daily        Mode: AC/ CMV (Assist Control/ Continuous Mandatory Ventilation)  RR (machine): 22  TV (machine): 400  FiO2: 21  PEEP: 6  MAP: 13  PIP: 25      ICU Vital Signs Last 24 Hrs  T(C): 36 (30 Aug 2024 07:45), Max: 37.2 (29 Aug 2024 14:15)  T(F): 96.8 (30 Aug 2024 07:45), Max: 99 (29 Aug 2024 14:15)  HR: 72 (30 Aug 2024 09:30) (39 - 124)  BP: 105/81 (30 Aug 2024 09:00) (81/45 - 142/95)  BP(mean): 90 (30 Aug 2024 09:00) (61 - 102)  ABP: 91/56 (30 Aug 2024 09:30) (82/51 - 121/67)  ABP(mean): 68 (30 Aug 2024 09:30) (-13 - 85)  RR: 22 (30 Aug 2024 09:30) (12 - 26)  SpO2: 100% (30 Aug 2024 09:30) (95% - 100%)    O2 Parameters below as of 30 Aug 2024 08:45  Patient On (Oxygen Delivery Method): ventilator    O2 Concentration (%): 39        ABG - ( 30 Aug 2024 04:10 )  pH, Arterial: 7.330 pH, Blood: x     /  pCO2: 40    /  pO2: 104   / HCO3: 21    / Base Excess: -4.8  /  SaO2: 99.7                I&O's Detail    29 Aug 2024 07:01  -  30 Aug 2024 07:00  --------------------------------------------------------  IN:    DOBUTamine: 62.4 mL    FentaNYL: 89.6 mL    IV PiggyBack: 200 mL    IV PiggyBack: 50 mL    IV PiggyBack: 175 mL    Lactated Ringers Bolus: 1000 mL    Norepinephrine: 511.2 mL    Propofol: 184.1 mL  Total IN: 2272.3 mL    OUT:    Indwelling Catheter - Urethral (mL): 980 mL  Total OUT: 980 mL    Total NET: 1292.3 mL      30 Aug 2024 07:01  -  30 Aug 2024 10:37  --------------------------------------------------------  IN:    DOBUTamine: 9.6 mL    FentaNYL: 16 mL    IV PiggyBack: 25 mL    Norepinephrine: 78 mL  Total IN: 128.6 mL    OUT:    Indwelling Catheter - Urethral (mL): 60 mL  Total OUT: 60 mL    Total NET: 68.6 mL            LABS:                        14.4   17.50 )-----------( 203      ( 30 Aug 2024 10:21 )             41.8     08-30    135  |  101  |  13.0  ----------------------------<  104<H>  3.8   |  18.0<L>  |  0.37<L>    Ca    8.3<L>      30 Aug 2024 04:00  Phos  2.9     08-30  Mg     1.9     08-30    TPro  6.3<L>  /  Alb  3.8  /  TBili  0.5  /  DBili  x   /  AST  150<H>  /  ALT  63<H>  /  AlkPhos  73  08-30      CARDIAC MARKERS ( 30 Aug 2024 04:00 )  x     / x     / x     / x     / 93.1 ng/mL  CARDIAC MARKERS ( 29 Aug 2024 22:00 )  x     / x     / x     / x     / 66.1 ng/mL      CAPILLARY BLOOD GLUCOSE      POCT Blood Glucose.: 167 mg/dL (29 Aug 2024 12:40)    PT/INR - ( 29 Aug 2024 12:51 )   PT: 11.2 sec;   INR: 1.01 ratio         PTT - ( 29 Aug 2024 12:51 )  PTT:28.7 sec  Urinalysis Basic - ( 30 Aug 2024 04:00 )    Color: x / Appearance: x / SG: x / pH: x  Gluc: 104 mg/dL / Ketone: x  / Bili: x / Urobili: x   Blood: x / Protein: x / Nitrite: x   Leuk Esterase: x / RBC: x / WBC x   Sq Epi: x / Non Sq Epi: x / Bacteria: x      CULTURES:      Physical Examination:    General: Sedated/ intubated/ appearing comfortable but shivering   HEENT: Pupils equal, reactive to light.  Symmetric.  PULM: Clear to auscultation bilaterally, no significant sputum production  NECK: Supple, no lymphadenopathy, trachea midline  CVS: Regular rate and rhythm, no murmurs, rubs, or gallops  ABD: Soft, nondistended, nontender, normoactive bowel sounds, no masses  EXT: No edema, nontender  SKIN: Warm and well perfused, no rashes noted.  NEURO: Alert, oriented, interactive, nonfocal      DEVICES:     RADIOLOGY:    INTERPRETATION:  CLINICAL INFORMATION: Cardiac arrest status post ROSC.   Evaluate for PE    COMPARISON: None.    CONTRAST/COMPLICATIONS:  IV Contrast: Omnipaque 350 (accession [id number]), IV contrast documented   in unlinked concurrent exam (accession [id number])  90 cc administered   10   cc discarded  Oral Contrast: NONE  Complications: None reported at time of study completion    PROCEDURE:  CT Angiography of the Chest was performed followed by portal venous phase   imaging of the Abdomen and Pelvis.  Sagittal and coronal reformats were performed as well as 3D (MIP)   reconstructions.    FINDINGS:  CHEST:  LUNGS AND LARGE AIRWAYS: Endotracheal tube terminating 1.8 cm above the   tania. Trace secretions in the trachea. Left upper lobe confluent   ground-glass opacities and left lower lobe peribronchovascular ground-glass   opacities and small a small consolidations.  PLEURA: No pleural effusion.  VESSELS: No pulmonary embolism through the segmental branches. Limited   evaluation of the subsegmental branches due to suboptimal contrast bolus   timing. Reflux of contrast into the hepatic veins.  HEART: Enlarged bilateral ventricles. No pericardial effusion.  MEDIASTINUM AND JEAN-CLAUDE: No lymphadenopathy.  CHEST WALL AND LOWER NECK: Within normal limits.    ABDOMEN AND PELVIS:  LIVER: Periportal edema.  BILE DUCTS: Normal caliber.  GALLBLADDER: Within normal limits.  SPLEEN: Within normal limits.  PANCREAS: Within normal limits.  ADRENALS: Within normal limits.  KIDNEYS/URETERS: Within normal limits.    BLADDER: East catheter in place with foci of air within the bladder   lumen.  REPRODUCTIVE ORGANS: Uterus and adnexa within normal limits.    BOWEL: No bowel obstruction. Appendix is normal.  PERITONEUM/RETROPERITONEUM: Within normal limits.  VESSELS: Within normal limits.  LYMPH NODES: No lymphadenopathy.  ABDOMINAL WALL: Within normal limits.  BONES: There is a 7 mm focus of a sclerosis in left sacral crest, likely   a bony island.    IMPRESSION:  No pulmonary embolism through branches. Limited evaluation of the   subsegmental branches due to suboptimal contrast bolus timing.    Left upper lobe confluent ground-glass opacities and left lower lobe   peribronchovascular ground-glass opacities and small a small   consolidations. In acute setting, finding may be suggestive of aspiration   pneumonitis or alveolar hemorrhage in left upper lobe.  Enlarged bilateral ventricles. Reflux of contrast into the hepatic veins.   Findings are nonspecific, but may be related to cardiac dysfunction.   Consider echocardiogram for further evaluation.            --- End of Report ---      NAVYA MONSIVAIS MD; Resident Radiologist  This document has been electronically signed.  ANABELA WYATT MD; Attending Radiologist  This document has been electronically signed. Aug 29 2024  3:55PM    PROCEDURE DATE:  08/29/2024          INTERPRETATION:  CLINICAL INFORMATION: s/p syncope    COMPARISON: None.    CONTRAST:  IV Contrast: NONE  Complications: None reported at time of study completion    TECHNIQUE:    CT BRAIN: Serial axial images were obtained from the skull base to the   vertex using multi-slice helical technique. Sagittal and coronal   reformats were obtained.    CT CERVICAL SPINE: Axial images were obtained of the cervical spine using   multislice helical technique. Reformatted coronal and sagittal images   were obtained.    FINDINGS:    CT BRAIN:    VENTRICLES AND SULCI: Normal in size and configuration.  INTRA-AXIAL: Tre matter differentiation is preserved. No mass effect,   acute hemorrhage, or midline shift.  EXTRA-AXIAL: No mass or fluid collection. Basal cisterns are normal in   appearance.    VISUALIZED SINUSES:  Clear.  TYMPANOMASTOID CAVITIES:  Clear.  VISUALIZED ORBITS: Normal.  CALVARIUM: Intact.        CT CERVICAL SPINE:    VERTEBRAE:  Normal in height. No acute fracture. No significant   osteophytosis.  ALIGNMENT: There is reversal cervical lordosis and mild dextroconvexity.   There is no traumatic subluxation. The craniocervical junction and C1-C2   relation is maintained.  INTERVERTEBRAL DISC SPACES: No significant disc bulge or focal disc   herniation. No significant spinal canal or neural foraminal stenosis.    VISUALIZED LUNGS: There is diffuse left lung airspace disease. See CTA   chest 8/29/2024    MISCELLANEOUS:  A endotracheal tube is in place terminating above tania.      IMPRESSION:    CT HEAD:    No acute intracranial hemorrhage, mass effect or calvarial fracture.    CT CERVICAL SPINE:    No acute cervical spine fracture or traumatic subluxation.    Reversal cervical lordosis may relate to positioning or muscle spasm.      --- End of Report ---            NANY RICO MD; Attending Radiologist  This document has been electronically signed. Aug 29 2024  2:37PM          CRITICAL CARE TIME SPENT: ***   Patient is a 23y old  Female who presents with a chief complaint of Cardiac Arrest (29 Aug 2024 23:48)      BRIEF HOSPITAL COURSE: 23 year old PMHx multiple syncopal episodes during strenuous exercise presents with witnessed syncopal episode/cardiac arrest. Patient was running with her boyfriend, on mile 8 patient sat down on a bench and syncopated. Patients boyfriend states patient was unresponsive, tried elevating legs with no improvement, states unsure if patient had a pulse. Called EMS, who arrived to the scene 5 mins, boyfriend did not provide CPR while awaiting EMS. Patient was found PEA, 3 rounds of CPR, 1 epi in the field, found to be in VTach and defibrillated. Upon arrival to ED, sinus bradycardia in 30s, atropine was given with improvement in HR, patient intubated for airway protection. CXR shows left sided infiltrates. CT chest shows possible aspiration PNA. Started on Zosyn 7/29. Continuous EEG without epileptiform activity    Interval HPI: No acute events overnight. TTE completed yesterday. Complete 24 hours total of continuous EEG. Trial off sedition completed 0700 hours in AM, erratic movements, not following commands. Cardiac US bedside shows apical hypokinesis, possible mildly reduced EF.    PAST MEDICAL & SURGICAL HISTORY:  Syncopal episodes      Review of Systems:  Unable to obtain.    Medications:  piperacillin/tazobactam IVPB.. 3.375 Gram(s) IV Intermittent every 8 hours    DOBUTamine Infusion 2.5 MICROgram(s)/kG/Min IV Continuous <Continuous>  norepinephrine Infusion 0.05 MICROgram(s)/kG/Min IV Continuous <Continuous>      fentaNYL   Infusion. 0.5 MICROgram(s)/kG/Hr IV Continuous <Continuous>  propofol Infusion 20 MICROgram(s)/kG/Min IV Continuous <Continuous>      heparin   Injectable 5000 Unit(s) SubCutaneous every 12 hours    pantoprazole  Injectable 40 milliGRAM(s) IV Push daily        sodium chloride 0.9% lock flush 10 milliLiter(s) IV Push every 1 hour PRN      artificial  tears Solution 1 Drop(s) Both EYES every 12 hours  chlorhexidine 0.12% Liquid 15 milliLiter(s) Oral Mucosa every 12 hours  chlorhexidine 2% Cloths 1 Application(s) Topical daily        Mode: AC/ CMV (Assist Control/ Continuous Mandatory Ventilation)  RR (machine): 22  TV (machine): 400  FiO2: 21  PEEP: 6  MAP: 13  PIP: 25      ICU Vital Signs Last 24 Hrs  T(C): 36 (30 Aug 2024 07:45), Max: 37.2 (29 Aug 2024 14:15)  T(F): 96.8 (30 Aug 2024 07:45), Max: 99 (29 Aug 2024 14:15)  HR: 72 (30 Aug 2024 09:30) (39 - 124)  BP: 105/81 (30 Aug 2024 09:00) (81/45 - 142/95)  BP(mean): 90 (30 Aug 2024 09:00) (61 - 102)  ABP: 91/56 (30 Aug 2024 09:30) (82/51 - 121/67)  ABP(mean): 68 (30 Aug 2024 09:30) (-13 - 85)  RR: 22 (30 Aug 2024 09:30) (12 - 26)  SpO2: 100% (30 Aug 2024 09:30) (95% - 100%)    O2 Parameters below as of 30 Aug 2024 08:45  Patient On (Oxygen Delivery Method): ventilator    O2 Concentration (%): 39        ABG - ( 30 Aug 2024 04:10 )  pH, Arterial: 7.330 pH, Blood: x     /  pCO2: 40    /  pO2: 104   / HCO3: 21    / Base Excess: -4.8  /  SaO2: 99.7                I&O's Detail    29 Aug 2024 07:01  -  30 Aug 2024 07:00  --------------------------------------------------------  IN:    DOBUTamine: 62.4 mL    FentaNYL: 89.6 mL    IV PiggyBack: 200 mL    IV PiggyBack: 50 mL    IV PiggyBack: 175 mL    Lactated Ringers Bolus: 1000 mL    Norepinephrine: 511.2 mL    Propofol: 184.1 mL  Total IN: 2272.3 mL    OUT:    Indwelling Catheter - Urethral (mL): 980 mL  Total OUT: 980 mL    Total NET: 1292.3 mL      30 Aug 2024 07:01  -  30 Aug 2024 10:37  --------------------------------------------------------  IN:    DOBUTamine: 9.6 mL    FentaNYL: 16 mL    IV PiggyBack: 25 mL    Norepinephrine: 78 mL  Total IN: 128.6 mL    OUT:    Indwelling Catheter - Urethral (mL): 60 mL  Total OUT: 60 mL    Total NET: 68.6 mL            LABS:                        14.4   17.50 )-----------( 203      ( 30 Aug 2024 10:21 )             41.8     08-30    135  |  101  |  13.0  ----------------------------<  104<H>  3.8   |  18.0<L>  |  0.37<L>    Ca    8.3<L>      30 Aug 2024 04:00  Phos  2.9     08-30  Mg     1.9     08-30    TPro  6.3<L>  /  Alb  3.8  /  TBili  0.5  /  DBili  x   /  AST  150<H>  /  ALT  63<H>  /  AlkPhos  73  08-30      CARDIAC MARKERS ( 30 Aug 2024 04:00 )  x     / x     / x     / x     / 93.1 ng/mL  CARDIAC MARKERS ( 29 Aug 2024 22:00 )  x     / x     / x     / x     / 66.1 ng/mL      CAPILLARY BLOOD GLUCOSE      POCT Blood Glucose.: 167 mg/dL (29 Aug 2024 12:40)    PT/INR - ( 29 Aug 2024 12:51 )   PT: 11.2 sec;   INR: 1.01 ratio         PTT - ( 29 Aug 2024 12:51 )  PTT:28.7 sec  Urinalysis Basic - ( 30 Aug 2024 04:00 )    Color: x / Appearance: x / SG: x / pH: x  Gluc: 104 mg/dL / Ketone: x  / Bili: x / Urobili: x   Blood: x / Protein: x / Nitrite: x   Leuk Esterase: x / RBC: x / WBC x   Sq Epi: x / Non Sq Epi: x / Bacteria: x      CULTURES:      Physical Examination:    General: Sedated/ intubated/ appearing comfortable but shivering   HEENT: Pupils equal, reactive to light.  Symmetric.  PULM: Clear to auscultation bilaterally, no significant sputum production  NECK: Supple, no lymphadenopathy, trachea midline  CVS: Regular rate and rhythm, no murmurs, rubs, or gallops  ABD: Soft, nondistended, nontender, normoactive bowel sounds, no masses  EXT: No edema, nontender  SKIN: Warm and well perfused, no rashes noted.  NEURO: Alert, oriented, interactive, nonfocal      DEVICES:     RADIOLOGY:    INTERPRETATION:  CLINICAL INFORMATION: Cardiac arrest status post ROSC.   Evaluate for PE    COMPARISON: None.    CONTRAST/COMPLICATIONS:  IV Contrast: Omnipaque 350 (accession [id number]), IV contrast documented   in unlinked concurrent exam (accession [id number])  90 cc administered   10   cc discarded  Oral Contrast: NONE  Complications: None reported at time of study completion    PROCEDURE:  CT Angiography of the Chest was performed followed by portal venous phase   imaging of the Abdomen and Pelvis.  Sagittal and coronal reformats were performed as well as 3D (MIP)   reconstructions.    FINDINGS:  CHEST:  LUNGS AND LARGE AIRWAYS: Endotracheal tube terminating 1.8 cm above the   tania. Trace secretions in the trachea. Left upper lobe confluent   ground-glass opacities and left lower lobe peribronchovascular ground-glass   opacities and small a small consolidations.  PLEURA: No pleural effusion.  VESSELS: No pulmonary embolism through the segmental branches. Limited   evaluation of the subsegmental branches due to suboptimal contrast bolus   timing. Reflux of contrast into the hepatic veins.  HEART: Enlarged bilateral ventricles. No pericardial effusion.  MEDIASTINUM AND JEAN-CLAUDE: No lymphadenopathy.  CHEST WALL AND LOWER NECK: Within normal limits.    ABDOMEN AND PELVIS:  LIVER: Periportal edema.  BILE DUCTS: Normal caliber.  GALLBLADDER: Within normal limits.  SPLEEN: Within normal limits.  PANCREAS: Within normal limits.  ADRENALS: Within normal limits.  KIDNEYS/URETERS: Within normal limits.    BLADDER: East catheter in place with foci of air within the bladder   lumen.  REPRODUCTIVE ORGANS: Uterus and adnexa within normal limits.    BOWEL: No bowel obstruction. Appendix is normal.  PERITONEUM/RETROPERITONEUM: Within normal limits.  VESSELS: Within normal limits.  LYMPH NODES: No lymphadenopathy.  ABDOMINAL WALL: Within normal limits.  BONES: There is a 7 mm focus of a sclerosis in left sacral crest, likely   a bony island.    IMPRESSION:  No pulmonary embolism through branches. Limited evaluation of the   subsegmental branches due to suboptimal contrast bolus timing.    Left upper lobe confluent ground-glass opacities and left lower lobe   peribronchovascular ground-glass opacities and small a small   consolidations. In acute setting, finding may be suggestive of aspiration   pneumonitis or alveolar hemorrhage in left upper lobe.  Enlarged bilateral ventricles. Reflux of contrast into the hepatic veins.   Findings are nonspecific, but may be related to cardiac dysfunction.   Consider echocardiogram for further evaluation.            --- End of Report ---      NAVYA MONSIVAIS MD; Resident Radiologist  This document has been electronically signed.  ANABELA WYATT MD; Attending Radiologist  This document has been electronically signed. Aug 29 2024  3:55PM    PROCEDURE DATE:  08/29/2024          INTERPRETATION:  CLINICAL INFORMATION: s/p syncope    COMPARISON: None.    CONTRAST:  IV Contrast: NONE  Complications: None reported at time of study completion    TECHNIQUE:    CT BRAIN: Serial axial images were obtained from the skull base to the   vertex using multi-slice helical technique. Sagittal and coronal   reformats were obtained.    CT CERVICAL SPINE: Axial images were obtained of the cervical spine using   multislice helical technique. Reformatted coronal and sagittal images   were obtained.    FINDINGS:    CT BRAIN:    VENTRICLES AND SULCI: Normal in size and configuration.  INTRA-AXIAL: Tre matter differentiation is preserved. No mass effect,   acute hemorrhage, or midline shift.  EXTRA-AXIAL: No mass or fluid collection. Basal cisterns are normal in   appearance.    VISUALIZED SINUSES:  Clear.  TYMPANOMASTOID CAVITIES:  Clear.  VISUALIZED ORBITS: Normal.  CALVARIUM: Intact.        CT CERVICAL SPINE:    VERTEBRAE:  Normal in height. No acute fracture. No significant   osteophytosis.  ALIGNMENT: There is reversal cervical lordosis and mild dextroconvexity.   There is no traumatic subluxation. The craniocervical junction and C1-C2   relation is maintained.  INTERVERTEBRAL DISC SPACES: No significant disc bulge or focal disc   herniation. No significant spinal canal or neural foraminal stenosis.    VISUALIZED LUNGS: There is diffuse left lung airspace disease. See CTA   chest 8/29/2024    MISCELLANEOUS:  A endotracheal tube is in place terminating above tania.      IMPRESSION:    CT HEAD:    No acute intracranial hemorrhage, mass effect or calvarial fracture.    CT CERVICAL SPINE:    No acute cervical spine fracture or traumatic subluxation.    Reversal cervical lordosis may relate to positioning or muscle spasm.      --- End of Report ---            NANY RICO MD; Attending Radiologist  This document has been electronically signed. Aug 29 2024  2:37PM          CRITICAL CARE TIME SPENT: ***   Patient is a 23y old  Female who presents with a chief complaint of Cardiac Arrest (29 Aug 2024 23:48)      BRIEF HOSPITAL COURSE: 23 year old PMHx multiple syncopal episodes during strenuous exercise presents with witnessed syncopal episode/cardiac arrest. Patient was running with her boyfriend, on mile 8 patient sat down on a bench and syncopated. Patients boyfriend states patient was unresponsive, tried elevating legs with no improvement, states unsure if patient had a pulse. Called EMS, who arrived to the scene 5 mins, boyfriend did not provide CPR while awaiting EMS. Patient was found PEA, 3 rounds of CPR, 1 epi in the field, found to be in VTach and defibrillated. Upon arrival to ED, sinus bradycardia in 30s, atropine was given with improvement in HR, patient intubated for airway protection. CXR shows left sided infiltrates. CT chest shows possible aspiration PNA. Started on Zosyn 7/29. Continuous EEG without epileptiform activity    Interval HPI: No acute events overnight. TTE completed yesterday. Complete 24 hours total of continuous EEG. Trial off sedition completed 0700 hours in AM, erratic movements, not following commands. Cardiac US bedside shows apical hypokinesis, possible mildly reduced EF.    PAST MEDICAL & SURGICAL HISTORY:  Syncopal episodes      Review of Systems:  Unable to obtain.    Medications:  piperacillin/tazobactam IVPB.. 3.375 Gram(s) IV Intermittent every 8 hours    DOBUTamine Infusion 2.5 MICROgram(s)/kG/Min IV Continuous <Continuous>  norepinephrine Infusion 0.05 MICROgram(s)/kG/Min IV Continuous <Continuous>      fentaNYL   Infusion. 0.5 MICROgram(s)/kG/Hr IV Continuous <Continuous>  propofol Infusion 20 MICROgram(s)/kG/Min IV Continuous <Continuous>      heparin   Injectable 5000 Unit(s) SubCutaneous every 12 hours    pantoprazole  Injectable 40 milliGRAM(s) IV Push daily        sodium chloride 0.9% lock flush 10 milliLiter(s) IV Push every 1 hour PRN      artificial  tears Solution 1 Drop(s) Both EYES every 12 hours  chlorhexidine 0.12% Liquid 15 milliLiter(s) Oral Mucosa every 12 hours  chlorhexidine 2% Cloths 1 Application(s) Topical daily        Mode: AC/ CMV (Assist Control/ Continuous Mandatory Ventilation)  RR (machine): 22  TV (machine): 400  FiO2: 21  PEEP: 6  MAP: 13  PIP: 25      ICU Vital Signs Last 24 Hrs  T(C): 36 (30 Aug 2024 07:45), Max: 37.2 (29 Aug 2024 14:15)  T(F): 96.8 (30 Aug 2024 07:45), Max: 99 (29 Aug 2024 14:15)  HR: 72 (30 Aug 2024 09:30) (39 - 124)  BP: 105/81 (30 Aug 2024 09:00) (81/45 - 142/95)  BP(mean): 90 (30 Aug 2024 09:00) (61 - 102)  ABP: 91/56 (30 Aug 2024 09:30) (82/51 - 121/67)  ABP(mean): 68 (30 Aug 2024 09:30) (-13 - 85)  RR: 22 (30 Aug 2024 09:30) (12 - 26)  SpO2: 100% (30 Aug 2024 09:30) (95% - 100%)    O2 Parameters below as of 30 Aug 2024 08:45  Patient On (Oxygen Delivery Method): ventilator    O2 Concentration (%): 39        ABG - ( 30 Aug 2024 04:10 )  pH, Arterial: 7.330 pH, Blood: x     /  pCO2: 40    /  pO2: 104   / HCO3: 21    / Base Excess: -4.8  /  SaO2: 99.7                I&O's Detail    29 Aug 2024 07:01  -  30 Aug 2024 07:00  --------------------------------------------------------  IN:    DOBUTamine: 62.4 mL    FentaNYL: 89.6 mL    IV PiggyBack: 200 mL    IV PiggyBack: 50 mL    IV PiggyBack: 175 mL    Lactated Ringers Bolus: 1000 mL    Norepinephrine: 511.2 mL    Propofol: 184.1 mL  Total IN: 2272.3 mL    OUT:    Indwelling Catheter - Urethral (mL): 980 mL  Total OUT: 980 mL    Total NET: 1292.3 mL      30 Aug 2024 07:01  -  30 Aug 2024 10:37  --------------------------------------------------------  IN:    DOBUTamine: 9.6 mL    FentaNYL: 16 mL    IV PiggyBack: 25 mL    Norepinephrine: 78 mL  Total IN: 128.6 mL    OUT:    Indwelling Catheter - Urethral (mL): 60 mL  Total OUT: 60 mL    Total NET: 68.6 mL            LABS:                        14.4   17.50 )-----------( 203      ( 30 Aug 2024 10:21 )             41.8     08-30    135  |  101  |  13.0  ----------------------------<  104<H>  3.8   |  18.0<L>  |  0.37<L>    Ca    8.3<L>      30 Aug 2024 04:00  Phos  2.9     08-30  Mg     1.9     08-30    TPro  6.3<L>  /  Alb  3.8  /  TBili  0.5  /  DBili  x   /  AST  150<H>  /  ALT  63<H>  /  AlkPhos  73  08-30      CARDIAC MARKERS ( 30 Aug 2024 04:00 )  x     / x     / x     / x     / 93.1 ng/mL  CARDIAC MARKERS ( 29 Aug 2024 22:00 )  x     / x     / x     / x     / 66.1 ng/mL      CAPILLARY BLOOD GLUCOSE      POCT Blood Glucose.: 167 mg/dL (29 Aug 2024 12:40)    PT/INR - ( 29 Aug 2024 12:51 )   PT: 11.2 sec;   INR: 1.01 ratio         PTT - ( 29 Aug 2024 12:51 )  PTT:28.7 sec  Urinalysis Basic - ( 30 Aug 2024 04:00 )    Color: x / Appearance: x / SG: x / pH: x  Gluc: 104 mg/dL / Ketone: x  / Bili: x / Urobili: x   Blood: x / Protein: x / Nitrite: x   Leuk Esterase: x / RBC: x / WBC x   Sq Epi: x / Non Sq Epi: x / Bacteria: x      CULTURES:      Physical Examination:    General: Sedated/ intubated/ appearing comfortable but shivering   HEENT: Pupils equal, reactive to light.  Symmetric.  PULM: Clear to auscultation bilaterally, no significant sputum production  NECK: Supple, no lymphadenopathy, trachea midline  CVS: Regular rate and rhythm, no murmurs, rubs, or gallops  ABD: Soft, nondistended, nontender, normoactive bowel sounds, no masses  EXT: No edema, nontender  SKIN: Warm and well perfused, no rashes noted.  NEURO: Alert, oriented, interactive, nonfocal      DEVICES:     RADIOLOGY:    INTERPRETATION:  CLINICAL INFORMATION: Cardiac arrest status post ROSC.   Evaluate for PE    COMPARISON: None.    CONTRAST/COMPLICATIONS:  IV Contrast: Omnipaque 350 (accession [id number]), IV contrast documented   in unlinked concurrent exam (accession [id number])  90 cc administered   10   cc discarded  Oral Contrast: NONE  Complications: None reported at time of study completion    PROCEDURE:  CT Angiography of the Chest was performed followed by portal venous phase   imaging of the Abdomen and Pelvis.  Sagittal and coronal reformats were performed as well as 3D (MIP)   reconstructions.    FINDINGS:  CHEST:  LUNGS AND LARGE AIRWAYS: Endotracheal tube terminating 1.8 cm above the   tania. Trace secretions in the trachea. Left upper lobe confluent   ground-glass opacities and left lower lobe peribronchovascular ground-glass   opacities and small a small consolidations.  PLEURA: No pleural effusion.  VESSELS: No pulmonary embolism through the segmental branches. Limited   evaluation of the subsegmental branches due to suboptimal contrast bolus   timing. Reflux of contrast into the hepatic veins.  HEART: Enlarged bilateral ventricles. No pericardial effusion.  MEDIASTINUM AND JEAN-CLAUDE: No lymphadenopathy.  CHEST WALL AND LOWER NECK: Within normal limits.    ABDOMEN AND PELVIS:  LIVER: Periportal edema.  BILE DUCTS: Normal caliber.  GALLBLADDER: Within normal limits.  SPLEEN: Within normal limits.  PANCREAS: Within normal limits.  ADRENALS: Within normal limits.  KIDNEYS/URETERS: Within normal limits.    BLADDER: East catheter in place with foci of air within the bladder   lumen.  REPRODUCTIVE ORGANS: Uterus and adnexa within normal limits.    BOWEL: No bowel obstruction. Appendix is normal.  PERITONEUM/RETROPERITONEUM: Within normal limits.  VESSELS: Within normal limits.  LYMPH NODES: No lymphadenopathy.  ABDOMINAL WALL: Within normal limits.  BONES: There is a 7 mm focus of a sclerosis in left sacral crest, likely   a bony island.    IMPRESSION:  No pulmonary embolism through branches. Limited evaluation of the   subsegmental branches due to suboptimal contrast bolus timing.    Left upper lobe confluent ground-glass opacities and left lower lobe   peribronchovascular ground-glass opacities and small a small   consolidations. In acute setting, finding may be suggestive of aspiration   pneumonitis or alveolar hemorrhage in left upper lobe.  Enlarged bilateral ventricles. Reflux of contrast into the hepatic veins.   Findings are nonspecific, but may be related to cardiac dysfunction.   Consider echocardiogram for further evaluation.            --- End of Report ---      NAVYA MONSIVAIS MD; Resident Radiologist  This document has been electronically signed.  ANABELA WYATT MD; Attending Radiologist  This document has been electronically signed. Aug 29 2024  3:55PM    PROCEDURE DATE:  08/29/2024          INTERPRETATION:  CLINICAL INFORMATION: s/p syncope    COMPARISON: None.    CONTRAST:  IV Contrast: NONE  Complications: None reported at time of study completion    TECHNIQUE:    CT BRAIN: Serial axial images were obtained from the skull base to the   vertex using multi-slice helical technique. Sagittal and coronal   reformats were obtained.    CT CERVICAL SPINE: Axial images were obtained of the cervical spine using   multislice helical technique. Reformatted coronal and sagittal images   were obtained.    FINDINGS:    CT BRAIN:    VENTRICLES AND SULCI: Normal in size and configuration.  INTRA-AXIAL: Tre matter differentiation is preserved. No mass effect,   acute hemorrhage, or midline shift.  EXTRA-AXIAL: No mass or fluid collection. Basal cisterns are normal in   appearance.    VISUALIZED SINUSES:  Clear.  TYMPANOMASTOID CAVITIES:  Clear.  VISUALIZED ORBITS: Normal.  CALVARIUM: Intact.        CT CERVICAL SPINE:    VERTEBRAE:  Normal in height. No acute fracture. No significant   osteophytosis.  ALIGNMENT: There is reversal cervical lordosis and mild dextroconvexity.   There is no traumatic subluxation. The craniocervical junction and C1-C2   relation is maintained.  INTERVERTEBRAL DISC SPACES: No significant disc bulge or focal disc   herniation. No significant spinal canal or neural foraminal stenosis.    VISUALIZED LUNGS: There is diffuse left lung airspace disease. See CTA   chest 8/29/2024    MISCELLANEOUS:  A endotracheal tube is in place terminating above tania.      IMPRESSION:    CT HEAD:    No acute intracranial hemorrhage, mass effect or calvarial fracture.    CT CERVICAL SPINE:    No acute cervical spine fracture or traumatic subluxation.    Reversal cervical lordosis may relate to positioning or muscle spasm.      --- End of Report ---            NANY RICO MD; Attending Radiologist  This document has been electronically signed. Aug 29 2024  2:37PM

## 2024-08-30 NOTE — PROGRESS NOTE ADULT - ASSESSMENT
NANY RODRIGES is a 23y female avid runner with PMHx multiple prior syncopal/near syncopal episodes with unremarkable cardio work up presents after witnessed out of hospital cardiac arrest. Now, found to have possible aspiration PNA.    PLAN:  #Cardiac Arrest  #Ventricular Arrythmia   #Anoxic Brain Injury  #Hypotension  #Bradycardia  #Myocardial Stunning  #Aspiration PNA      Neuro:  #Anoxic Brain Injury  - Light Sedation with Fentanyl, RASS goal 0/-2  - CT Head reviewed no acute intracranial pathology  - Continue Artic son for cooling to avoid fevers for 24 hours  - Repeat CT Head 8/31  - Continuous video EEG 24 hours, no seizure activity yet  - Daily neuro checks off sedation    CVS:  #Cardiac Arrest  #Ventricular Arrythmia   #Hypotension  #Bradycardia  - Titrate Levophed Maintain MAP>65  - Continue dobutamine as inotrope   - Cardiac monitor  - Daily EKG, Right axis deviation, RBBB  - TTE pending official report  - CTA no PE  - Follows Dr. Soliman at [facility]  - ICD placement before discharge, cardiac MRI, pending neurological status CT coronary vs RHC/LHC to eval for anomalous coronary artery  cardiology following,  EP team following,       Resp:  - Intubated 10 PEEP, , RR 18 FiO2 21  - Daily SBT  - CXR: Left lung infiltrates, possible aspiration during resuscitation   - CT Chest possible aspiration pneumontits   - continue to monitor on   - Aspiration precautions    GI:  - CT abdomen/pelvis some periportal edema, otherwise normal  - Protonix for GI ppx  - Mildly elevated LFTs possibly due to hypoperfusion of liver during arrest, continue to monitor  - NPO at midnight, tube feeds and protein supplementation    Renal:  - Daily Labs  - Strict I/Os  - Replete lytes as needed    ID:  Afebrile  WBC count uptrending, possible aspiration PNA   - repeat CXR today  - Follow up Blood cultures   - Continue mpiric treatment Zosyn  - UA noninfectoius   - Follow up UCx  - RVP negative     Heme:  H/H normal  Coags wnl  DVT ppx SQ heparin    Endo:  - Daily Finger Sticks  - Follow up TFTs  - Follow up Serum HCG      Code Status: Full Code  Dispo: Requires MICU level of care at this time    Case discussed with MICU Attending: Dr. Tipton

## 2024-08-30 NOTE — PROGRESS NOTE ADULT - ASSESSMENT
Assessment  Exertional syncope with initial rhythm PEA then VT defib X 2  Anterior WMA E 37% c/w infarction vs stunning post arrest/defib  Prior negative workup incl TTE, holter, MCOT, ETT and Tilt  Differential includes anomalous L coronary artery with ex related arrhythmia/infarction vs RVD vs intermittent congenital long QT  Aspiration PNA    Rec:  cont vent support, IV levo/dobutamine  wean from sedation to assess mental status  If mental status improves will arrange cardiac cath/cardiac MRI and ICD  will follow

## 2024-08-30 NOTE — PROGRESS NOTE ADULT - SUBJECTIVE AND OBJECTIVE BOX
[facility]                                   [address] [place] [address]                                                      PHONE: [phone]                                                         FAX: [phone]  [email or web address]  ---------------------------------------------------------------------------------------------------------------------------------    Overnight events/patient complaints: events noted, tele unremarkable, TTE reviewed anteroapical akinesia E 37%,      Allergy Status Unknown    MEDICATIONS  (STANDING):  artificial  tears Solution 1 Drop(s) Both EYES every 12 hours  chlorhexidine 0.12% Liquid 15 milliLiter(s) Oral Mucosa every 12 hours  chlorhexidine 2% Cloths 1 Application(s) Topical daily  DOBUTamine Infusion 2.5 MICROgram(s)/kG/Min (4.8 mL/Hr) IV Continuous <Continuous>  fentaNYL   Infusion. 0.5 MICROgram(s)/kG/Hr (2.95 mL/Hr) IV Continuous <Continuous>  heparin   Injectable 5000 Unit(s) SubCutaneous every 12 hours  norepinephrine Infusion 0.05 MICROgram(s)/kG/Min (5.53 mL/Hr) IV Continuous <Continuous>  pantoprazole  Injectable 40 milliGRAM(s) IV Push daily  piperacillin/tazobactam IVPB.. 3.375 Gram(s) IV Intermittent every 8 hours  propofol Infusion 20 MICROgram(s)/kG/Min (7.08 mL/Hr) IV Continuous <Continuous>    MEDICATIONS  (PRN):  sodium chloride 0.9% lock flush 10 milliLiter(s) IV Push every 1 hour PRN Pre/post blood products, medications, blood draw, and to maintain line patency      Vital Signs Last 24 Hrs  T(C): 36 (30 Aug 2024 07:45), Max: 37.2 (29 Aug 2024 14:15)  T(F): 96.8 (30 Aug 2024 07:45), Max: 99 (29 Aug 2024 14:15)  HR: 72 (30 Aug 2024 09:30) (39 - 124)  BP: 105/81 (30 Aug 2024 09:00) (81/45 - 142/95)  BP(mean): 90 (30 Aug 2024 09:00) (61 - 102)  RR: 22 (30 Aug 2024 09:30) (12 - 26)  SpO2: 100% (30 Aug 2024 09:30) (95% - 100%)    Parameters below as of 30 Aug 2024 08:45  Patient On (Oxygen Delivery Method): ventilator    O2 Concentration (%): 39  Daily Height in cm: 167.64 (29 Aug 2024 14:40)    Daily Weight in k.7 (30 Aug 2024 03:05)  ICU Vital Signs Last 24 Hrs  NANY RODRIGES  I&O's Detail    29 Aug 2024 07:  -  30 Aug 2024 07:00  --------------------------------------------------------  IN:    DOBUTamine: 62.4 mL    FentaNYL: 89.6 mL    IV PiggyBack: 200 mL    IV PiggyBack: 50 mL    IV PiggyBack: 175 mL    Lactated Ringers Bolus: 1000 mL    Norepinephrine: 511.2 mL    Propofol: 184.1 mL  Total IN: 2272.3 mL    OUT:    Indwelling Catheter - Urethral (mL): 980 mL  Total OUT: 980 mL    Total NET: 1292.3 mL      30 Aug 2024 07:01  -  30 Aug 2024 10:35  --------------------------------------------------------  IN:    DOBUTamine: 9.6 mL    FentaNYL: 16 mL    IV PiggyBack: 25 mL    Norepinephrine: 78 mL  Total IN: 128.6 mL    OUT:    Indwelling Catheter - Urethral (mL): 60 mL  Total OUT: 60 mL    Total NET: 68.6 mL        I&O's Summary    29 Aug 2024 07:01  -  30 Aug 2024 07:00  --------------------------------------------------------  IN: 2272.3 mL / OUT: 980 mL / NET: 1292.3 mL    30 Aug 2024 07:  -  30 Aug 2024 10:35  --------------------------------------------------------  IN: 128.6 mL / OUT: 60 mL / NET: 68.6 mL      Drug Dosing Weight  NANY RODRIGES  Mode: AC/ CMV (Assist Control/ Continuous Mandatory Ventilation), RR (machine): 22, TV (machine): 400, FiO2: 21, PEEP: 6, MAP: 13, PIP: 25    PHYSICAL EXAM:  General: Appears sedated on vent  HEENT: Head; normocephalic, atraumatic.  Eyes: Pupils reactive, cornea wnl.  Neck: Supple, no nodes adenopathy, no NVD or carotid bruit or thyromegaly.  CARDIOVASCULAR: Normal S1 and S2, No murmur, rub, gallop or lift.   LUNGS: No rales, rhonchi or wheeze. Normal breath sounds bilaterally.  ABDOMEN: Soft, nontender without mass or organomegaly. bowel sounds normoactive.  EXTREMITIES: No clubbing, cyanosis or edema. Distal pulses wnl.   SKIN: warm and dry with normal turgor.  NEURO: sedated  PSYCH: normal affect.        LABS:                        14.1   19.41 )-----------( 220      ( 30 Aug 2024 04:00 )             41.3     08-30    135  |  101  |  13.0  ----------------------------<  104<H>  3.8   |  18.0<L>  |  0.37<L>    Ca    8.3<L>      30 Aug 2024 04:00  Phos  2.9     08-30  Mg     1.9     08-30    TPro  6.3<L>  /  Alb  3.8  /  TBili  0.5  /  DBili  x   /  AST  150<H>  /  ALT  63<H>  /  AlkPhos  73  08-30    NANY RODRIGES  CARDIAC MARKERS ( 30 Aug 2024 04:00 )  x     / x     / x     / x     / 93.1 ng/mL  CARDIAC MARKERS ( 29 Aug 2024 22:00 )  x     / x     / x     / x     / 66.1 ng/mL      PT/INR - ( 29 Aug 2024 12:51 )   PT: 11.2 sec;   INR: 1.01 ratio         PTT - ( 29 Aug 2024 12:51 )  PTT:28.7 sec  Urinalysis Basic - ( 30 Aug 2024 04:00 )    Color: x / Appearance: x / SG: x / pH: x  Gluc: 104 mg/dL / Ketone: x  / Bili: x / Urobili: x   Blood: x / Protein: x / Nitrite: x   Leuk Esterase: x / RBC: x / WBC x   Sq Epi: x / Non Sq Epi: x / Bacteria: x        RADIOLOGY & ADDITIONAL STUDIES:< from: CT Angio Chest PE Protocol w/ IV Cont (24 @ 14:32) >  ACC: [id number] EXAM:  CT ABDOMEN AND PELVIS IC   ORDERED BY: TRAVON DANGELO     ACC: [id number] EXAM:  CT ANGIO CHEST PULM [facility]   ORDERED BY: TRAVON DANGELO     PROCEDURE DATE:  2024          INTERPRETATION:  CLINICAL INFORMATION: Cardiac arrest status post ROSC.   Evaluate for PE    COMPARISON: None.    CONTRAST/COMPLICATIONS:  IV Contrast: Omnipaque 350 (accession [id number]), IV contrast documented   in unlinked concurrent exam (accession [id number])  90 cc administered   10   cc discarded  Oral Contrast: NONE  Complications: None reported at time of study completion    PROCEDURE:  CT Angiography of the Chest was performed followed by portal venous phase   imaging of the Abdomen and Pelvis.  Sagittal and coronal reformats were performed as well as 3D (MIP)   reconstructions.    FINDINGS:  CHEST:  LUNGS AND LARGE AIRWAYS: Endotracheal tube terminating 1.8 cm above the   tania. Trace secretions in the trachea. Left upper lobe confluent   groundglass opacities and left lower lobe peribronchovascular groundglass   opacities and small a small consolidations.  PLEURA: No pleural effusion.  VESSELS: No pulmonary embolism through the segmental branches. Limited   evaluation of the subsegmental branches due to suboptimal contrast bolus   timing. Reflux of contrast into the hepatic veins.  HEART: Enlarged bilateral ventricles. No pericardial effusion.  MEDIASTINUM AND JEAN-CLAUDE: No lymphadenopathy.  CHEST WALL AND LOWER NECK: Within normal limits.    ABDOMEN AND PELVIS:  LIVER: Periportal edema.  BILE DUCTS: Normal caliber.  GALLBLADDER: Within normal limits.  SPLEEN: Within normal limits.  PANCREAS: Within normal limits.  ADRENALS: Within normal limits.  KIDNEYS/URETERS: Within normal limits.    BLADDER: East catheter in place with foci of air within the bladder   lumen.  REPRODUCTIVE ORGANS: Uterus and adnexa within normal limits.    BOWEL: No bowel obstruction. Appendix is normal.  PERITONEUM/RETROPERITONEUM: Within normal limits.  VESSELS: Within normal limits.  LYMPH NODES: No lymphadenopathy.  ABDOMINAL WALL: Within normal limits.  BONES: There is a 7 mm focus of a sclerosis in left sacral crest, likely   a bony island.    IMPRESSION:  No pulmonary embolism through branches. Limited evaluation of the   subsegmental branches due to suboptimal contrast bolus timing.    Left upper lobe confluent groundglass opacities and left lower lobe   peribronchovascular groundglass opacities and small a small   consolidations. In acute setting, finding may be suggestive of aspiration   pneumonitis or alveolar hemorrhage in left upper lobe.  Enlarged bilateral ventricles. Reflux of contrast into the hepatic veins.   Findings are nonspecific, but may be related to cardiac dysfunction.   Consider echocardiogram for further evaluation.            --- End of Report ---          NAVAY MONSIVAIS MD; Resident Radiologist  This document has been electronically signed.  ANABELA WYATT MD; Attending Radiologist  This document has been electronically signed. Aug 29 2024  3:55PM    < end of copied text >      INTERPRETATION OF TELEMETRY (personally reviewed):    ECG: reviewed this am: SR RAD PRWP IVCD no suggestion of Brugada        Troponin T, High Sensitivity Result: 2330: *  *  Rapid upward or downward changes in high-sensitivity troponin levels  suggest acute myocardial injury. Renal impairment may cause sustained  troponin elevations.  Normal: <6 - 14 ng/L  Indeterminate: 15-51 ng/L  Elevated: > 51 ng/L  See http://labs/test/TROPTHS on the [employer] intranet for more  information ng/L (24 @ 17:41)

## 2024-08-31 ENCOUNTER — RESULT REVIEW (OUTPATIENT)
Age: 24
End: 2024-08-31

## 2024-08-31 LAB
ALBUMIN SERPL ELPH-MCNC: 3.2 G/DL — LOW (ref 3.3–5.2)
ALBUMIN SERPL ELPH-MCNC: 3.2 G/DL — LOW (ref 3.3–5.2)
ALBUMIN SERPL ELPH-MCNC: 3.3 G/DL — SIGNIFICANT CHANGE UP (ref 3.3–5.2)
ALBUMIN SERPL ELPH-MCNC: 3.4 G/DL — SIGNIFICANT CHANGE UP (ref 3.3–5.2)
ALP SERPL-CCNC: 62 U/L — SIGNIFICANT CHANGE UP (ref 40–120)
ALP SERPL-CCNC: 62 U/L — SIGNIFICANT CHANGE UP (ref 40–120)
ALP SERPL-CCNC: 63 U/L — SIGNIFICANT CHANGE UP (ref 40–120)
ALP SERPL-CCNC: 67 U/L — SIGNIFICANT CHANGE UP (ref 40–120)
ALT FLD-CCNC: 60 U/L — HIGH
ALT FLD-CCNC: 63 U/L — HIGH
ALT FLD-CCNC: 66 U/L — HIGH
ALT FLD-CCNC: 67 U/L — HIGH
ANION GAP SERPL CALC-SCNC: 12 MMOL/L — SIGNIFICANT CHANGE UP (ref 5–17)
ANION GAP SERPL CALC-SCNC: 14 MMOL/L — SIGNIFICANT CHANGE UP (ref 5–17)
ANION GAP SERPL CALC-SCNC: 16 MMOL/L — SIGNIFICANT CHANGE UP (ref 5–17)
ANION GAP SERPL CALC-SCNC: 16 MMOL/L — SIGNIFICANT CHANGE UP (ref 5–17)
APPEARANCE UR: ABNORMAL
APPEARANCE UR: CLEAR — SIGNIFICANT CHANGE UP
AST SERPL-CCNC: 137 U/L — HIGH
AST SERPL-CCNC: 154 U/L — HIGH
AST SERPL-CCNC: 165 U/L — HIGH
AST SERPL-CCNC: 175 U/L — HIGH
BASOPHILS # BLD AUTO: 0.02 K/UL — SIGNIFICANT CHANGE UP (ref 0–0.2)
BASOPHILS NFR BLD AUTO: 0.1 % — SIGNIFICANT CHANGE UP (ref 0–2)
BILIRUB SERPL-MCNC: 0.6 MG/DL — SIGNIFICANT CHANGE UP (ref 0.4–2)
BILIRUB SERPL-MCNC: 0.6 MG/DL — SIGNIFICANT CHANGE UP (ref 0.4–2)
BILIRUB SERPL-MCNC: 0.7 MG/DL — SIGNIFICANT CHANGE UP (ref 0.4–2)
BILIRUB SERPL-MCNC: 0.7 MG/DL — SIGNIFICANT CHANGE UP (ref 0.4–2)
BILIRUB UR-MCNC: NEGATIVE — SIGNIFICANT CHANGE UP
BILIRUB UR-MCNC: NEGATIVE — SIGNIFICANT CHANGE UP
BUN SERPL-MCNC: 6.3 MG/DL — LOW (ref 8–20)
BUN SERPL-MCNC: 6.6 MG/DL — LOW (ref 8–20)
BUN SERPL-MCNC: 8.4 MG/DL — SIGNIFICANT CHANGE UP (ref 8–20)
BUN SERPL-MCNC: 9.8 MG/DL — SIGNIFICANT CHANGE UP (ref 8–20)
CALCIUM SERPL-MCNC: 8 MG/DL — LOW (ref 8.4–10.5)
CALCIUM SERPL-MCNC: 8.3 MG/DL — LOW (ref 8.4–10.5)
CALCIUM SERPL-MCNC: 8.4 MG/DL — SIGNIFICANT CHANGE UP (ref 8.4–10.5)
CALCIUM SERPL-MCNC: 8.7 MG/DL — SIGNIFICANT CHANGE UP (ref 8.4–10.5)
CHLORIDE SERPL-SCNC: 100 MMOL/L — SIGNIFICANT CHANGE UP (ref 96–108)
CHLORIDE SERPL-SCNC: 100 MMOL/L — SIGNIFICANT CHANGE UP (ref 96–108)
CHLORIDE SERPL-SCNC: 102 MMOL/L — SIGNIFICANT CHANGE UP (ref 96–108)
CHLORIDE SERPL-SCNC: 105 MMOL/L — SIGNIFICANT CHANGE UP (ref 96–108)
CK SERPL-CCNC: 3199 U/L — HIGH (ref 25–170)
CO2 SERPL-SCNC: 18 MMOL/L — LOW (ref 22–29)
CO2 SERPL-SCNC: 19 MMOL/L — LOW (ref 22–29)
CO2 SERPL-SCNC: 20 MMOL/L — LOW (ref 22–29)
CO2 SERPL-SCNC: 22 MMOL/L — SIGNIFICANT CHANGE UP (ref 22–29)
COLOR SPEC: YELLOW — SIGNIFICANT CHANGE UP
COLOR SPEC: YELLOW — SIGNIFICANT CHANGE UP
CREAT SERPL-MCNC: 0.33 MG/DL — LOW (ref 0.5–1.3)
CREAT SERPL-MCNC: 0.36 MG/DL — LOW (ref 0.5–1.3)
CREAT SERPL-MCNC: 0.39 MG/DL — LOW (ref 0.5–1.3)
CREAT SERPL-MCNC: 0.44 MG/DL — LOW (ref 0.5–1.3)
CRP SERPL-MCNC: 113 MG/L — HIGH
CRP SERPL-MCNC: 113 MG/L — HIGH
DIFF PNL FLD: ABNORMAL
DIFF PNL FLD: ABNORMAL
EGFR: 140 ML/MIN/1.73M2 — SIGNIFICANT CHANGE UP
EGFR: 144 ML/MIN/1.73M2 — SIGNIFICANT CHANGE UP
EGFR: 147 ML/MIN/1.73M2 — SIGNIFICANT CHANGE UP
EGFR: 150 ML/MIN/1.73M2 — SIGNIFICANT CHANGE UP
EOSINOPHIL # BLD AUTO: 0 K/UL — SIGNIFICANT CHANGE UP (ref 0–0.5)
EOSINOPHIL NFR BLD AUTO: 0 % — SIGNIFICANT CHANGE UP (ref 0–6)
ERYTHROCYTE [SEDIMENTATION RATE] IN BLOOD: 21 MM/HR — HIGH (ref 0–20)
ERYTHROCYTE [SEDIMENTATION RATE] IN BLOOD: 31 MM/HR — HIGH (ref 0–20)
GAS PNL BLDA: SIGNIFICANT CHANGE UP
GAS PNL BLDA: SIGNIFICANT CHANGE UP
GAS PNL BLDV: SIGNIFICANT CHANGE UP
GLUCOSE SERPL-MCNC: 109 MG/DL — HIGH (ref 70–99)
GLUCOSE SERPL-MCNC: 116 MG/DL — HIGH (ref 70–99)
GLUCOSE SERPL-MCNC: 118 MG/DL — HIGH (ref 70–99)
GLUCOSE SERPL-MCNC: 154 MG/DL — HIGH (ref 70–99)
GLUCOSE UR QL: NEGATIVE MG/DL — SIGNIFICANT CHANGE UP
GLUCOSE UR QL: NEGATIVE MG/DL — SIGNIFICANT CHANGE UP
HAV IGM SER-ACNC: SIGNIFICANT CHANGE UP
HBV CORE IGM SER-ACNC: SIGNIFICANT CHANGE UP
HBV SURFACE AG SER-ACNC: SIGNIFICANT CHANGE UP
HCT VFR BLD CALC: 36.9 % — SIGNIFICANT CHANGE UP (ref 34.5–45)
HCT VFR BLD CALC: 38.6 % — SIGNIFICANT CHANGE UP (ref 34.5–45)
HCV AB S/CO SERPL IA: 0.07 S/CO — SIGNIFICANT CHANGE UP (ref 0–0.99)
HCV AB SERPL-IMP: SIGNIFICANT CHANGE UP
HGB BLD-MCNC: 12.9 G/DL — SIGNIFICANT CHANGE UP (ref 11.5–15.5)
HGB BLD-MCNC: 13.5 G/DL — SIGNIFICANT CHANGE UP (ref 11.5–15.5)
IMM GRANULOCYTES NFR BLD AUTO: 0.4 % — SIGNIFICANT CHANGE UP (ref 0–0.9)
KETONES UR-MCNC: 15 MG/DL
KETONES UR-MCNC: >=160 MG/DL
LACTATE SERPL-SCNC: 1.1 MMOL/L — SIGNIFICANT CHANGE UP (ref 0.5–2)
LACTATE SERPL-SCNC: 1.1 MMOL/L — SIGNIFICANT CHANGE UP (ref 0.5–2)
LEUKOCYTE ESTERASE UR-ACNC: ABNORMAL
LEUKOCYTE ESTERASE UR-ACNC: NEGATIVE — SIGNIFICANT CHANGE UP
LYMPHOCYTES # BLD AUTO: 1.14 K/UL — SIGNIFICANT CHANGE UP (ref 1–3.3)
LYMPHOCYTES # BLD AUTO: 7.8 % — LOW (ref 13–44)
MAGNESIUM SERPL-MCNC: 1.6 MG/DL — SIGNIFICANT CHANGE UP (ref 1.6–2.6)
MAGNESIUM SERPL-MCNC: 1.7 MG/DL — SIGNIFICANT CHANGE UP (ref 1.6–2.6)
MCHC RBC-ENTMCNC: 31.9 PG — SIGNIFICANT CHANGE UP (ref 27–34)
MCHC RBC-ENTMCNC: 32.1 PG — SIGNIFICANT CHANGE UP (ref 27–34)
MCHC RBC-ENTMCNC: 35 GM/DL — SIGNIFICANT CHANGE UP (ref 32–36)
MCHC RBC-ENTMCNC: 35 GM/DL — SIGNIFICANT CHANGE UP (ref 32–36)
MCV RBC AUTO: 91.3 FL — SIGNIFICANT CHANGE UP (ref 80–100)
MCV RBC AUTO: 91.9 FL — SIGNIFICANT CHANGE UP (ref 80–100)
MONOCYTES # BLD AUTO: 1.06 K/UL — HIGH (ref 0–0.9)
MONOCYTES NFR BLD AUTO: 7.2 % — SIGNIFICANT CHANGE UP (ref 2–14)
NEUTROPHILS # BLD AUTO: 12.42 K/UL — HIGH (ref 1.8–7.4)
NEUTROPHILS NFR BLD AUTO: 84.5 % — HIGH (ref 43–77)
NITRITE UR-MCNC: NEGATIVE — SIGNIFICANT CHANGE UP
NITRITE UR-MCNC: NEGATIVE — SIGNIFICANT CHANGE UP
NT-PROBNP SERPL-SCNC: 7250 PG/ML — HIGH (ref 0–300)
PH UR: 5.5 — SIGNIFICANT CHANGE UP (ref 5–8)
PH UR: 7 — SIGNIFICANT CHANGE UP (ref 5–8)
PHOSPHATE SERPL-MCNC: 2.2 MG/DL — LOW (ref 2.4–4.7)
PLATELET # BLD AUTO: 201 K/UL — SIGNIFICANT CHANGE UP (ref 150–400)
PLATELET # BLD AUTO: 202 K/UL — SIGNIFICANT CHANGE UP (ref 150–400)
POTASSIUM SERPL-MCNC: 3.7 MMOL/L — SIGNIFICANT CHANGE UP (ref 3.5–5.3)
POTASSIUM SERPL-MCNC: 3.9 MMOL/L — SIGNIFICANT CHANGE UP (ref 3.5–5.3)
POTASSIUM SERPL-MCNC: 3.9 MMOL/L — SIGNIFICANT CHANGE UP (ref 3.5–5.3)
POTASSIUM SERPL-MCNC: 4.1 MMOL/L — SIGNIFICANT CHANGE UP (ref 3.5–5.3)
POTASSIUM SERPL-SCNC: 3.7 MMOL/L — SIGNIFICANT CHANGE UP (ref 3.5–5.3)
POTASSIUM SERPL-SCNC: 3.9 MMOL/L — SIGNIFICANT CHANGE UP (ref 3.5–5.3)
POTASSIUM SERPL-SCNC: 3.9 MMOL/L — SIGNIFICANT CHANGE UP (ref 3.5–5.3)
POTASSIUM SERPL-SCNC: 4.1 MMOL/L — SIGNIFICANT CHANGE UP (ref 3.5–5.3)
PROCALCITONIN SERPL-MCNC: 0.71 NG/ML — HIGH (ref 0.02–0.1)
PROT SERPL-MCNC: 5.6 G/DL — LOW (ref 6.6–8.7)
PROT SERPL-MCNC: 5.9 G/DL — LOW (ref 6.6–8.7)
PROT SERPL-MCNC: 6 G/DL — LOW (ref 6.6–8.7)
PROT SERPL-MCNC: 6.1 G/DL — LOW (ref 6.6–8.7)
PROT UR-MCNC: 100 MG/DL
PROT UR-MCNC: SIGNIFICANT CHANGE UP MG/DL
RAPID RVP RESULT: SIGNIFICANT CHANGE UP
RBC # BLD: 4.04 M/UL — SIGNIFICANT CHANGE UP (ref 3.8–5.2)
RBC # BLD: 4.2 M/UL — SIGNIFICANT CHANGE UP (ref 3.8–5.2)
RBC # FLD: 12.5 % — SIGNIFICANT CHANGE UP (ref 10.3–14.5)
RBC # FLD: 12.6 % — SIGNIFICANT CHANGE UP (ref 10.3–14.5)
SARS-COV-2 RNA SPEC QL NAA+PROBE: SIGNIFICANT CHANGE UP
SODIUM SERPL-SCNC: 134 MMOL/L — LOW (ref 135–145)
SODIUM SERPL-SCNC: 135 MMOL/L — SIGNIFICANT CHANGE UP (ref 135–145)
SODIUM SERPL-SCNC: 136 MMOL/L — SIGNIFICANT CHANGE UP (ref 135–145)
SODIUM SERPL-SCNC: 139 MMOL/L — SIGNIFICANT CHANGE UP (ref 135–145)
SP GR SPEC: 1.01 — SIGNIFICANT CHANGE UP (ref 1–1.03)
SP GR SPEC: >1.03 — HIGH (ref 1–1.03)
UROBILINOGEN FLD QL: 0.2 MG/DL — SIGNIFICANT CHANGE UP (ref 0.2–1)
UROBILINOGEN FLD QL: 1 MG/DL — SIGNIFICANT CHANGE UP (ref 0.2–1)
WBC # BLD: 14.7 K/UL — HIGH (ref 3.8–10.5)
WBC # BLD: 14.77 K/UL — HIGH (ref 3.8–10.5)
WBC # FLD AUTO: 14.7 K/UL — HIGH (ref 3.8–10.5)
WBC # FLD AUTO: 14.77 K/UL — HIGH (ref 3.8–10.5)

## 2024-08-31 PROCEDURE — 76700 US EXAM ABDOM COMPLETE: CPT | Mod: 26

## 2024-08-31 PROCEDURE — 93010 ELECTROCARDIOGRAM REPORT: CPT

## 2024-08-31 PROCEDURE — 71045 X-RAY EXAM CHEST 1 VIEW: CPT | Mod: 26

## 2024-08-31 PROCEDURE — 99291 CRITICAL CARE FIRST HOUR: CPT | Mod: GC

## 2024-08-31 RX ORDER — MAGNESIUM, ALUMINUM HYDROXIDE 200-225/5
30 SUSPENSION, ORAL (FINAL DOSE FORM) ORAL EVERY 4 HOURS
Refills: 0 | Status: DISCONTINUED | OUTPATIENT
Start: 2024-08-31 | End: 2024-09-11

## 2024-08-31 RX ORDER — POLYETHYLENE GLYCOL 3350 17 G/17G
17 POWDER, FOR SOLUTION ORAL DAILY
Refills: 0 | Status: DISCONTINUED | OUTPATIENT
Start: 2024-08-31 | End: 2024-09-11

## 2024-08-31 RX ORDER — DOBUTAMINE 12.5 MG/ML
5 INJECTION, SOLUTION INTRAVENOUS
Qty: 500 | Refills: 0 | Status: DISCONTINUED | OUTPATIENT
Start: 2024-08-31 | End: 2024-08-31

## 2024-08-31 RX ORDER — ROPIVACAINE IN 0.9% SOD CHL/PF 0.1 %
0.05 PLASTIC BAG, INJECTION (ML) EPIDURAL
Qty: 8 | Refills: 0 | Status: DISCONTINUED | OUTPATIENT
Start: 2024-08-31 | End: 2024-08-31

## 2024-08-31 RX ORDER — METOCLOPRAMIDE HCL 5 MG
10 TABLET ORAL ONCE
Refills: 0 | Status: COMPLETED | OUTPATIENT
Start: 2024-08-31 | End: 2024-08-31

## 2024-08-31 RX ORDER — METHYLPREDNISOLONE 4 MG
40 TABLET ORAL EVERY 12 HOURS
Refills: 0 | Status: DISCONTINUED | OUTPATIENT
Start: 2024-08-31 | End: 2024-09-03

## 2024-08-31 RX ORDER — POTASSIUM PHOSPHATE 236; 224 MG/ML; MG/ML
15 INJECTION, SOLUTION INTRAVENOUS ONCE
Refills: 0 | Status: COMPLETED | OUTPATIENT
Start: 2024-08-31 | End: 2024-08-31

## 2024-08-31 RX ORDER — SENNA 187 MG
2 TABLET ORAL AT BEDTIME
Refills: 0 | Status: DISCONTINUED | OUTPATIENT
Start: 2024-08-31 | End: 2024-09-11

## 2024-08-31 RX ORDER — METOCLOPRAMIDE HCL 5 MG
5 TABLET ORAL EVERY 6 HOURS
Refills: 0 | Status: DISCONTINUED | OUTPATIENT
Start: 2024-08-31 | End: 2024-08-31

## 2024-08-31 RX ORDER — DOBUTAMINE 12.5 MG/ML
2.5 INJECTION, SOLUTION INTRAVENOUS
Qty: 500 | Refills: 0 | Status: DISCONTINUED | OUTPATIENT
Start: 2024-08-31 | End: 2024-09-02

## 2024-08-31 RX ORDER — ONDANSETRON 2 MG/ML
4 INJECTION, SOLUTION INTRAMUSCULAR; INTRAVENOUS EVERY 6 HOURS
Refills: 0 | Status: DISCONTINUED | OUTPATIENT
Start: 2024-08-31 | End: 2024-09-03

## 2024-08-31 RX ORDER — LORAZEPAM 4 MG/ML
0.5 INJECTION INTRAMUSCULAR; INTRAVENOUS ONCE
Refills: 0 | Status: DISCONTINUED | OUTPATIENT
Start: 2024-08-31 | End: 2024-08-31

## 2024-08-31 RX ORDER — ROPIVACAINE IN 0.9% SOD CHL/PF 0.1 %
0.05 PLASTIC BAG, INJECTION (ML) EPIDURAL
Qty: 8 | Refills: 0 | Status: DISCONTINUED | OUTPATIENT
Start: 2024-08-31 | End: 2024-09-02

## 2024-08-31 RX ORDER — METOCLOPRAMIDE HCL 5 MG
10 TABLET ORAL ONCE
Refills: 0 | Status: DISCONTINUED | OUTPATIENT
Start: 2024-08-31 | End: 2024-09-03

## 2024-08-31 RX ADMIN — PIPERACILLIN SODIUM AND TAZOBACTAM SODIUM 25 GRAM(S): 3; .375 INJECTION, POWDER, FOR SOLUTION INTRAVENOUS at 05:06

## 2024-08-31 RX ADMIN — PIPERACILLIN SODIUM AND TAZOBACTAM SODIUM 25 GRAM(S): 3; .375 INJECTION, POWDER, FOR SOLUTION INTRAVENOUS at 22:59

## 2024-08-31 RX ADMIN — Medication 5.53 MICROGRAM(S)/KG/MIN: at 04:05

## 2024-08-31 RX ADMIN — POTASSIUM PHOSPHATE 62.5 MILLIMOLE(S): 236; 224 INJECTION, SOLUTION INTRAVENOUS at 08:50

## 2024-08-31 RX ADMIN — ONDANSETRON 4 MILLIGRAM(S): 2 INJECTION, SOLUTION INTRAMUSCULAR; INTRAVENOUS at 20:03

## 2024-08-31 RX ADMIN — Medication 40 MILLIGRAM(S): at 11:53

## 2024-08-31 RX ADMIN — Medication 5000 UNIT(S): at 18:05

## 2024-08-31 RX ADMIN — Medication 5000 UNIT(S): at 05:05

## 2024-08-31 RX ADMIN — ONDANSETRON 4 MILLIGRAM(S): 2 INJECTION, SOLUTION INTRAMUSCULAR; INTRAVENOUS at 12:21

## 2024-08-31 RX ADMIN — Medication 500 MILLILITER(S): at 15:52

## 2024-08-31 RX ADMIN — Medication 10 MILLIGRAM(S): at 05:05

## 2024-08-31 RX ADMIN — DOBUTAMINE 4.8 MICROGRAM(S)/KG/MIN: 12.5 INJECTION, SOLUTION INTRAVENOUS at 04:05

## 2024-08-31 RX ADMIN — Medication 25 GRAM(S): at 08:22

## 2024-08-31 RX ADMIN — Medication 40 MILLIGRAM(S): at 18:05

## 2024-08-31 RX ADMIN — LORAZEPAM 0.5 MILLIGRAM(S): 4 INJECTION INTRAMUSCULAR; INTRAVENOUS at 07:37

## 2024-08-31 RX ADMIN — POVIDONE, PROPYLENE GLYCOL 1 DROP(S): 6.8; 3 LIQUID OPHTHALMIC at 17:36

## 2024-08-31 RX ADMIN — PIPERACILLIN SODIUM AND TAZOBACTAM SODIUM 25 GRAM(S): 3; .375 INJECTION, POWDER, FOR SOLUTION INTRAVENOUS at 14:13

## 2024-08-31 RX ADMIN — POVIDONE, PROPYLENE GLYCOL 1 DROP(S): 6.8; 3 LIQUID OPHTHALMIC at 05:06

## 2024-08-31 RX ADMIN — CHLORHEXIDINE GLUCONATE 1 APPLICATION(S): 40 SOLUTION TOPICAL at 11:53

## 2024-08-31 RX ADMIN — Medication 6 MICROGRAM(S)/KG/MIN: at 12:29

## 2024-08-31 NOTE — DIETITIAN INITIAL EVALUATION ADULT - PERTINENT LABORATORY DATA
08-31    135  |  100  |  6.6<L>  ----------------------------<  109<H>  3.9   |  19.0<L>  |  0.33<L>    Ca    8.0<L>      31 Aug 2024 10:45  Phos  2.2     08-31  Mg     1.7     08-31    TPro  5.6<L>  /  Alb  3.2<L>  /  TBili  0.7  /  DBili  x   /  AST  154<H>  /  ALT  63<H>  /  AlkPhos  63  08-31

## 2024-08-31 NOTE — DIETITIAN INITIAL EVALUATION ADULT - NSFNSGIIOFT_GEN_A_CORE
08-30-24 @ 07:01  -  08-31-24 @ 07:00  --------------------------------------------------------  OUT:    Nasogastric/Oral tube (mL): 50 mL  Total OUT: 50 mL    Total NET: -20 mL

## 2024-08-31 NOTE — DIETITIAN INITIAL EVALUATION ADULT - PERTINENT MEDS FT
MEDICATIONS  (STANDING):  pantoprazole  Injectable 40 milliGRAM(s) IV Push daily    MEDICATIONS  (PRN):  ondansetron Injectable 4 milliGRAM(s) IV Push every 6 hours PRN Nausea and/or Vomiting  sodium chloride 0.9% lock flush 10 milliLiter(s) IV Push every 1 hour PRN Pre/post blood products, medications, blood draw, and to maintain line patency

## 2024-08-31 NOTE — PROGRESS NOTE ADULT - SUBJECTIVE AND OBJECTIVE BOX
[place] [facility]ARD[facility], THE HEART CENTER                                   [address] [place] [address]                                                      PHONE: [phone]                                                         FAX: [phone]  [email or web address]  ---------------------------------------------------------------------------------------------------------------------------------    Overnight events/patient complaints:  No complaint. Remains on dobutamine 5 and norepi 0.2    Allergy Status Unknown    MEDICATIONS  (STANDING):  artificial  tears Solution 1 Drop(s) Both EYES every 12 hours  chlorhexidine 2% Cloths 1 Application(s) Topical daily  DOBUTamine Infusion 2.5 MICROgram(s)/kG/Min (4.8 mL/Hr) IV Continuous <Continuous>  heparin   Injectable 5000 Unit(s) SubCutaneous every 12 hours  norepinephrine Infusion 0.05 MICROgram(s)/kG/Min (6 mL/Hr) IV Continuous <Continuous>  pantoprazole  Injectable 40 milliGRAM(s) IV Push daily  piperacillin/tazobactam IVPB.. 3.375 Gram(s) IV Intermittent every 8 hours    MEDICATIONS  (PRN):  ondansetron Injectable 4 milliGRAM(s) IV Push every 6 hours PRN Nausea and/or Vomiting  sodium chloride 0.9% lock flush 10 milliLiter(s) IV Push every 1 hour PRN Pre/post blood products, medications, blood draw, and to maintain line patency      Vital Signs Last 24 Hrs  T(C): 37.3 (31 Aug 2024 11:45), Max: 38 (31 Aug 2024 01:30)  T(F): 99.1 (31 Aug 2024 11:45), Max: 100.4 (31 Aug 2024 01:30)  HR: 95 (31 Aug 2024 11:45) (68 - 131)  BP: 115/76 (31 Aug 2024 11:00) (92/74 - 127/89)  BP(mean): 89 (31 Aug 2024 11:00) (67 - 102)  RR: 22 (31 Aug 2024 11:45) (14 - 28)  SpO2: 100% (31 Aug 2024 11:45) (96% - 100%)    Parameters below as of 31 Aug 2024 08:00  Patient On (Oxygen Delivery Method): room air      ICU Vital Signs Last 24 Hrs  NANY RODRIGES  I&O's Detail    30 Aug 2024 07:01  -  31 Aug 2024 07:00  --------------------------------------------------------  IN:    DOBUTamine: 220.8 mL    FentaNYL: 118.4 mL    IV PiggyBack: 200 mL    Jevity: 30 mL    Lactated Ringers Bolus: 250 mL    Norepinephrine: 632.6 mL    Propofol: 24.2 mL  Total IN: 1476 mL    OUT:    Indwelling Catheter - Urethral (mL): 1175 mL    Nasogastric/Oral tube (mL): 50 mL  Total OUT: 1225 mL    Total NET: 251 mL      31 Aug 2024 07:01  -  31 Aug 2024 12:10  --------------------------------------------------------  IN:    DOBUTamine: 48 mL    IV PiggyBack: 50 mL    IV PiggyBack: 25 mL    IV PiggyBack: 250 mL    Norepinephrine: 48 mL    Norepinephrine: 66 mL  Total IN: 487 mL    OUT:    Indwelling Catheter - Urethral (mL): 260 mL  Total OUT: 260 mL    Total NET: 227 mL        I&O's Summary    30 Aug 2024 07:01  -  31 Aug 2024 07:00  --------------------------------------------------------  IN: 1476 mL / OUT: 1225 mL / NET: 251 mL    31 Aug 2024 07:01  -  31 Aug 2024 12:10  --------------------------------------------------------  IN: 487 mL / OUT: 260 mL / NET: 227 mL      Drug Dosing Weight  NANYJAMARI RODRIGES  Mode: CPAP with PS, FiO2: 21, PEEP: 6, PS: 6, MAP: 10    PHYSICAL EXAM:  General: Appears comfortable, alert and cooperative.  HEENT: Normocephalic, atraumatic.  Eyes: Pupils reactive, cornea wnl.  Neck: Supple, no nodes adenopathy; no JVD, carotid bruit or thyromegaly.  CARDIOVASCULAR: Regular S1 S2 with no murmur, rub, gallop or lift.   LUNGS: No rales, rhonchi or wheeze. Normal breath sounds bilaterally.  ABDOMEN: Soft, nontender without mass or organomegaly. bowel sounds normoactive.  EXTREMITIES: No clubbing, cyanosis or edema. Distal pulses wnl.   SKIN: Warm and dry with normal turgor.  NEURO: Alert/oriented to self, month. Doesn't know name of hospital  PSYCH: Appropriate affect        LABS:                        12.9   14.70 )-----------( 201      ( 31 Aug 2024 04:24 )             36.9     08-31    135  |  100  |  6.6<L>  ----------------------------<  109<H>  3.9   |  19.0<L>  |  0.33<L>    Ca    8.0<L>      31 Aug 2024 10:45  Phos  2.2     08-31  Mg     1.7     08-31    TPro  5.6<L>  /  Alb  3.2<L>  /  TBili  0.7  /  DBili  x   /  AST  154<H>  /  ALT  63<H>  /  AlkPhos  63  08-31    NANYJAMARI RODRIGES  CARDIAC MARKERS ( 31 Aug 2024 00:45 )  x     / x     / x     / x     / 60.8 ng/mL  CARDIAC MARKERS ( 30 Aug 2024 10:21 )  x     / x     / x     / x     / 95.1 ng/mL  CARDIAC MARKERS ( 30 Aug 2024 04:00 )  x     / x     / x     / x     / 93.1 ng/mL  CARDIAC MARKERS ( 29 Aug 2024 22:00 )  x     / x     / x     / x     / 66.1 ng/mL      PT/INR - ( 29 Aug 2024 12:51 )   PT: 11.2 sec;   INR: 1.01 ratio         PTT - ( 29 Aug 2024 12:51 )  PTT:28.7 sec  Urinalysis Basic - ( 31 Aug 2024 10:45 )    Color: x / Appearance: x / SG: x / pH: x  Gluc: 109 mg/dL / Ketone: x  / Bili: x / Urobili: x   Blood: x / Protein: x / Nitrite: x   Leuk Esterase: x / RBC: x / WBC x   Sq Epi: x / Non Sq Epi: x / Bacteria: x        RADIOLOGY & ADDITIONAL STUDIES:    INTERPRETATION OF TELEMETRY (personally reviewed):  SR, ST    ASSESSMENT AND PLAN:  In summary, NANY RODRIGES is an 23y Female with history of intermittent RBBB, near syncope after intense exercise in the past, negative TTT who presented with PEA and VT arrest s/p hypothermia protocol, now with new LV dysfunction on dobutamine and levophed    - No indication for mechanical circulatory support at this point  - Continue dobutamine and levophed, wean as tolerated  - Eventual coronary evaluation and cMRI when clinically improved

## 2024-08-31 NOTE — EEG REPORT - NS EEG TEXT BOX
[facility]   COMPREHENSIVE EPILEPSY CENTER   REPORT OF CONTINUOUS VIDEO EEG     [facility]: [address], [place] [address], Ph#: [phone]  [facility]: [address], [place] [address], Ph#: [phone]  Office: [address] [place] [address], [facility] [address], [place] [address] Ph#: [phone]    Patient Name: NANY RODRIGES  Age and : 23y ([birth year])  MRN #: [id number]  Location: [address]   Referring Physician: Cuauhtemoc Alicea    Study Date: 24 - 24  Duration: 23 hr 57 min  _____________________________________________________________  STUDY INFORMATION    EEG Recording Technique:  The patient underwent continuous Video-EEG monitoring, using Telemetry System hardware on the XLTek Digital System. EEG and video data were stored on a computer hard drive with important events saved in digital archive files. The material was reviewed by a physician (electroencephalographer / epileptologist) on a daily basis. Hema and seizure detection algorithms were utilized and reviewed. An EEG Technician attended to the patient, and was available throughout daytime work hours.  The epilepsy center neurologist was available in person or on call 24-hours per day.    EEG Placement and Labeling of Electrodes:  The EEG was performed utilizing 20 channel referential EEG connections (coronal over temporal over parasagittal montage) using all standard 10-20 electrode placements with EKG, with additional electrodes placed in the inferior temporal region using the modified 10-10 montage electrode placements for elective admissions, or if deemed necessary. Recording was at a sampling rate of 256 samples per second per channel. Time synchronized digital video recording was done simultaneously with EEG recording. A low light infrared camera was used for low light recording.     _____________________________________________________________  HISTORY    Patient is a 23y old  Female who presents with a chief complaint of Cardiac Arrest/ Anoxic Brain Injury/ Aspiration PNA (30 Aug 2024 10:36)      PERTINENT MEDICATION:  MEDICATIONS  (STANDING):  artificial  tears Solution 1 Drop(s) Both EYES every 12 hours  chlorhexidine 2% Cloths 1 Application(s) Topical daily  DOBUTamine Infusion 2.5 MICROgram(s)/kG/Min (4.8 mL/Hr) IV Continuous <Continuous>  heparin   Injectable 5000 Unit(s) SubCutaneous every 12 hours  norepinephrine Infusion 0.05 MICROgram(s)/kG/Min (6 mL/Hr) IV Continuous <Continuous>  pantoprazole  Injectable 40 milliGRAM(s) IV Push daily  piperacillin/tazobactam IVPB.. 3.375 Gram(s) IV Intermittent every 8 hours    _____________________________________________________________  INTERPRETATION    Findings: The background was continuous, spontaneously variable and reactive. During wakefulness, the posterior dominant rhythm consisted of symmetric, well-modulated 9-10 Hz activity, with amplitude to 30 uV, that attenuated to eye opening.  Low amplitude frontal beta was noted in wakefulness.    Background Slowing:  -Intermittent diffuse theta and polymorphic delta slowing.    Focal Slowing:   None was present.    Sleep Background:  Drowsiness was characterized by fragmentation, attenuation, and slowing of the background activity.    Sleep was characterized by the presence of vertex waves, symmetric spindles, and K-complexes.    Other Non-Epileptiform Findings:  None were present.    Interictal Epileptiform Activity:   None were present.    Events:  Clinical events: None recorded.  Seizures: None recorded.    Artifacts:  Intermittent myogenic and movement artifacts were noted.    ECG:  The heart rate on single channel ECG was predominantly between  BPM.    _____________________________________________________________  EEG SUMMARY/CLASSIFICATION    Abnormal EEG in the awake, drowsy and asleep states.  -Intermittent slowing, generalized  _____________________________________________________________  EEG IMPRESSION/CLINICAL CORRELATE    Abnormal EEG study.  1. Mild nonspecific diffuse or multifocal cerebral dysfunction.   2. No epileptiform abnormalities or seizures were recorded.    Jani Perrin MD  Neurology Attending Physician

## 2024-08-31 NOTE — DIETITIAN INITIAL EVALUATION ADULT - OTHER INFO
24 yo female active runner with hx of unexplained syncope and extensive cardiac workup, presented with out of hospital cardiac arrest. Patient was running, on her 8th mile, when she suddenly collapsed which was witnessed by her boyfriend. EMS initially found the patient in PEA and later found VT which they defibrillated, obtained rosc in the field, patient intubated in the ED.  Upon arrival to ED, sinus bradycardia in 30s, atropine was given with improvement in HR, patient intubated for airway protection. CXR shows left sided infiltrates. CT chest shows possible aspiration PNA. Started on Zosyn 7/29. Continuous EEG without epileptiform activity.    #Cardiac Arrest/ Anoxic Brain Injury/ Aspiration PNA    #Cardiac Arrest  #Ventricular Arrythmia   #Anoxic Brain Injury  #Hypotension  #Bradycardia  #Myocardial Stunning  #Aspiration PNA

## 2024-08-31 NOTE — DIETITIAN INITIAL EVALUATION ADULT - ORAL NUTRITION SUPPLEMENTS
Add Ensure Enlive BID to optimize po intake and provide an additional 350 kcal, 20g protein per serving

## 2024-08-31 NOTE — DIETITIAN INITIAL EVALUATION ADULT - LAB (SPECIFY)
Subjective   Above-noted.  Patient on room air.  Subjective    Still with occasional cough.  No wheezing reported.  Review of Systems   No fevers, chills or sweats.  Objective     Objective   Visit Vitals  BP (!) 160/82 (BP Location: RUE - Right upper extremity, Patient Position: Supine)   Pulse 89   Temp 97.7 °F (36.5 °C) (Oral)   Resp 16   Ht 5' 2.01\" (1.575 m)   Wt 68.2 kg (150 lb 5.7 oz)   SpO2 98%   BMI 27.49 kg/m²     Oxygen flowing at 1 L/min   She was on room air during encounter.  Physical Exam   No distress.     Conjunctiva pink and sclera anicteric.  No JVD.  No upper airway wheezing.  Scattered rhonchi.  Crackles right base.  Heart rhythm irregularly irregular..  Abdomen soft and nontender.  No hepatosplenomegaly.  No clubbing or cyanosis.    Trace dependent edema.    I/O's       Intake/Output Summary (Last 24 hours) at 6/12/2021 0854  Last data filed at 6/12/2021 0630  Gross per 24 hour   Intake 254.33 ml   Output 750 ml   Net -495.67 ml         Labs     Recent Labs   Lab 06/11/21  1146   WBC 8.5   HCT 39.0   HGB 12.7        Recent Labs   Lab 06/11/21  1146 06/09/21  0300   SODIUM 139 142   POTASSIUM 4.2 5.0   CHLORIDE 102 104   CO2 34* 38*   GLUCOSE 156* 220*   BUN 21* 28*   CREATININE 0.45* 0.48*         Imaging     XR CHEST PA OR AP 1 VIEW   Final Result   1.  Findings compatible with resolving pneumonia.   2.  Cardiomegaly with vascular congestion.      Electronically Signed by: ANNETTE BRAVO M.D.    Signed on: 6/11/2021 9:49 AM          XR CHEST PA OR AP 1 VIEW   Final Result   1.  Cardiomegaly.   2.  Bilateral lower lobe atelectasis and effusions.      Electronically Signed by: ADOLFO BRICEÑO M.D.    Signed on: 6/7/2021 2:06 PM          XR CHEST PA OR AP 1 VIEW   Final Result   FINDINGS AND IMPRESSION:   Frontal view of the chest was obtained.        Severe degenerative changes of the left shoulder.      Stable cardiomegaly.  Mild vascular congestion.      There has been mild interval  improvement in aeration of the left lower lobe   with small effusion and adjacent airspace disease.  Trace right effusion.    No pneumothorax.      Electronically Signed by: JOSSELYN LOMAX M.D.    Signed on: 6/4/2021 11:35 PM          CTA CHEST PULMONARY EMBOLISM W CONTRAST   Final Result   1.  Dense consolidation areas of the left lower lobe and right upper lobe.    Query lobar pneumonia.  Follow-up advised to exclude a underlying centrally   obstructing lesion.   2.  Small bilateral pleural effusions.   3.  No evidence of pulmonary arterial embolism identified.   4.  Additional findings as above.      **The absence of findings should not deter follow-up or further evaluation   of concerning clinical findings.      Electronically Signed by: ELELN CARTWRIGHT M.D.    Signed on: 6/2/2021 3:28 PM          XR CHEST PA OR AP 1 VIEW   Final Result   1.    Cardiomegaly and pulmonary vascular congestion with mild interstitial   pulmonary edema and small effusions appearing slightly improved from   05/30/2021.      Electronically Signed by: LEANDRO KATHLEEN M.D.    Signed on: 6/1/2021 10:06 AM          XR CHEST PA OR AP 1 VIEW   Final Result   1.  Stable bilateral pleural effusions and bilateral pulmonary opacities   representing areas of infiltrate or edema.      Electronically Signed by: GERALDO MEEHAN M.D.    Signed on: 5/30/2021 11:28 AM          XR CHEST PA OR AP 1 VIEW   Final Result   Pulmonary opacities appear more pronounced, possibly   representing a combination of pulmonary vascular congestion and multifocal   infectious/inflammatory process, with small left pleural effusion also   suspected.      Electronically Signed by: SABAS CHOU M.D.    Signed on: 5/27/2021 6:15 PM          FL GUIDANCE WITHOUT REPORT   Final Result      XR HIPS 2 BILATERAL VIEWS EACH HIP W PELVIS 1 VIEW   Final Result   1. Right transcervical fracture of the lower right femoral neck. No   dislocation of the right hip.   2. No other acute  fractures seen of the right femur or visualized pelvis.   3. Old fracture deformity involving the left lower pelvis and left hip.   Prior open reduction internal fixation of the left hip.      Electronically Signed by: KOJO NAYAK JR, M.D.    Signed on: 5/25/2021 9:26 PM          XR CHEST PA OR AP 1 VIEW   Final Result   1. Limited exam as described above. Bibasilar infiltrates or atelectasis.   2. Cardiomegaly.      Electronically Signed by: KOJO NAYAK JR, M.D.    Signed on: 5/25/2021 9:20 PM          XR FEMUR MIN 2 VIEW RIGHT   Final Result   1. Right transcervical fracture of the lower right femoral neck. No   dislocation of the right hip.   2. No other acute fractures seen of the right femur or visualized pelvis.   3. Old fracture deformity involving the left lower pelvis and left hip.   Prior open reduction internal fixation of the left hip.      Electronically Signed by: KOJO NAYAK JR, M.D.    Signed on: 5/25/2021 9:26 PM                Diagnosis   Acute respiratory failure with hypoxemia.  Improved.  Pneumonia.  Improved.  At high risk for aspiration  Asthma exacerbation.  Improved.  Atrial fibrillation with rapid ventricular response.  Heart rate controlled.  Cardiology following.     Acute on chronic congestive heart failure With preserved ejection fraction.  Acute component resolved.  Right femoral neck fracture s/p cannulated screw fixation on 5/26/2021    Plan   Planning on discharge to home tomorrow.  Empiric antibiotics..  Given her significant improvement, I would be okay to discontinue antibiotics upon discharge, possibly tomorrow.  Scheduled Bronchodilators by nebulizer -Albuterol only.  Pulmonary hygiene measures  Continue Cough suppression and mucolytic.  Continue Trial of systemic steroids.Will taper.  Keep O2 saturation 92 to 95%  Digoxin per cardiology.  Trial of Diuretic  Speech therapy Following.  DVT Prophylaxis    Inpatient Medications     Current Facility-Administered Medications    Medication   • predniSONE (DELTASONE) tablet 20 mg   • nystatin (MYCOSTATIN) 701908 UNIT/ML suspension 500,000 Units   • digoxin (LANOXIN) tablet 125 mcg   • docusate sodium (COLACE) 50 MG/5ML liquid 100 mg   • [Held by provider] potassium CHLORIDE (KLOR-CON) packet 20 mEq   • memantine (NAMENDA) tablet 10 mg   • albuterol (ACCUNEB) nebulizer 1.25 mg   • apixaBAN (ELIQUIS) tablet 2.5 mg   • guaiFENesin-DM) (ROBITUSSIN DM) 100-10 MG/5ML syrup 10 mL   • piperacillin-tazobactam (ZOSYN) 3.375 g in sodium chloride 0.9 % 100 mL IVPB   • HYDROcodone-acetaminophen (NORCO) 5-325 MG per tablet 1 tablet   • pantoprazole (PROTONIX) EC tablet 40 mg   • QUEtiapine (SEROquel) tablet 25 mg   • nystatin (MYCOSTATIN) powder   • albuterol inhaler 2 puff   • amLODIPine (NORVASC) tablet 10 mg   • budesonide-formoterol (SYMBICORT) 160-4.5 MCG/ACT inhaler 2 puff   • carvedilol (COREG) tablet 25 mg   • sodium chloride 0.9 % flush bag 25 mL   • sodium chloride (PF) 0.9 % injection 2 mL   • acetaminophen (TYLENOL) tablet 650 mg   • HYDROcodone-acetaminophen (NORCO) 5-325 MG per tablet 1 tablet   • morphine injection 2 mg   • ondansetron (ZOFRAN) injection 4 mg   • zolpidem (AMBIEN) tablet 5 mg   • LORazepam (ATIVAN) injection 0.5 mg     Charting performed by demetria Valiente for Dr. Du Nina.      All medical record entries made by the nimeshibmarc were at my direction. I have reviewed the chart and agree that the record accurately reflects my personal performance of the history, physical exam, hospital course, and assessment and plan.         lytes, glucose, triglycerides

## 2024-08-31 NOTE — DIETITIAN INITIAL EVALUATION ADULT - ADD RECOMMEND
1) Continue diet as tolerated.   2) Rx MVI daily, vit C 500mg   3) Monitor weights daily for trend/accuracy   4) Provide encouragement/assistance as needed during mealtimes to inc PO   5) consider alternate means of nutrition if poor po intake persists  6) Add Ensure Enlive BID

## 2024-08-31 NOTE — PROGRESS NOTE ADULT - SUBJECTIVE AND OBJECTIVE BOX
Patient is a 23y old  Female who presents with a chief complaint of Cardiac Arrest (31 Aug 2024 12:10)      BRIEF HOSPITAL COURSE: BRIEF HOSPITAL COURSE: 23 year old PMHx multiple syncopal episodes during strenuous exercise presents with witnessed syncopal episode/cardiac arrest. Patient was running with her boyfriend, on mile 8 patient sat down on a bench and syncopated. Patients boyfriend states patient was unresponsive, tried elevating legs with no improvement, states unsure if patient had a pulse. Called EMS, who arrived to the scene 5 mins, boyfriend did not provide CPR while awaiting EMS. Patient was found PEA, 3 rounds of CPR, 1 epi in the field, found to be in VTach and defibrillated. Upon arrival to ED, sinus bradycardia in 30s, atropine was given with improvement in HR, patient intubated for airway protection. CXR shows left sided infiltrates. CT chest shows possible aspiration PNA. Started on Zosyn 8/29. Continuous EEG without epileptiform activity. TTE completed yesterday EF of 30-35%, dyskinesis of apex of heart. Complete 24 hours total of continuous EEG. Trial off sedition yesterday, patient following commands, making purposeful movements. Still requiring Levo and dobutamine for pressure and inotropic support.    Interval HPI: No acute events overnight.     PAST MEDICAL & SURGICAL HISTORY:  Syncopal episodes      Review of Systems:  CONSTITUTIONAL: No fever, chills, or fatigue  EYES: No eye pain, visual disturbances, or discharge  ENMT:  No difficulty hearing, tinnitus, vertigo; No sinus or throat pain  RESPIRATORY: + cough, wheezing, chills or hemoptysis; No shortness of breath  CARDIOVASCULAR: No chest pain, palpitations, dizziness, or leg swelling  GASTROINTESTINAL: No abdominal or epigastric pain. No nausea, vomiting, or hematemesis; No diarrhea or constipation.  GENITOURINARY: No dysuria, frequency, hematuria, or incontinence  NEUROLOGICAL: No headaches, memory loss, loss of strength, or numbness  SKIN: No rash  MUSCULOSKELETAL: No joint pain or swelling; No muscle, back, or extremity pain  PSYCHIATRIC: No depression, anxiety, mood swings, or difficulty sleeping      Medications:  piperacillin/tazobactam IVPB.. 3.375 Gram(s) IV Intermittent every 8 hours    DOBUTamine Infusion 2.5 MICROgram(s)/kG/Min IV Continuous <Continuous>  norepinephrine Infusion 0.05 MICROgram(s)/kG/Min IV Continuous <Continuous>    ondansetron Injectable 4 milliGRAM(s) IV Push every 6 hours PRN    heparin   Injectable 5000 Unit(s) SubCutaneous every 12 hours    pantoprazole  Injectable 40 milliGRAM(s) IV Push daily    sodium chloride 0.9% lock flush 10 milliLiter(s) IV Push every 1 hour PRN      artificial  tears Solution 1 Drop(s) Both EYES every 12 hours  chlorhexidine 2% Cloths 1 Application(s) Topical daily        Mode: CPAP with PS  FiO2: 21  PEEP: 6  PS: 6  MAP: 10      ICU Vital Signs Last 24 Hrs  T(C): 37.3 (31 Aug 2024 11:45), Max: 38 (31 Aug 2024 01:30)  T(F): 99.1 (31 Aug 2024 11:45), Max: 100.4 (31 Aug 2024 01:30)  HR: 95 (31 Aug 2024 11:45) (68 - 131)  BP: 115/76 (31 Aug 2024 11:00) (92/74 - 127/89)  BP(mean): 89 (31 Aug 2024 11:00) (67 - 102)  ABP: 103/56 (31 Aug 2024 11:45) (62/36 - 122/67)  ABP(mean): 69 (31 Aug 2024 11:45) (46 - 88)  RR: 22 (31 Aug 2024 11:45) (14 - 28)  SpO2: 100% (31 Aug 2024 11:45) (96% - 100%)    O2 Parameters below as of 31 Aug 2024 08:00  Patient On (Oxygen Delivery Method): room air            ABG - ( 31 Aug 2024 04:21 )  pH, Arterial: 7.400 pH, Blood: x     /  pCO2: 33    /  pO2: 81    / HCO3: 20    / Base Excess: -4.4  /  SaO2: 98.6                I&O's Detail    30 Aug 2024 07:01  -  31 Aug 2024 07:00  --------------------------------------------------------  IN:    DOBUTamine: 220.8 mL    FentaNYL: 118.4 mL    IV PiggyBack: 200 mL    Jevity: 30 mL    Lactated Ringers Bolus: 250 mL    Norepinephrine: 632.6 mL    Propofol: 24.2 mL  Total IN: 1476 mL    OUT:    Indwelling Catheter - Urethral (mL): 1175 mL    Nasogastric/Oral tube (mL): 50 mL  Total OUT: 1225 mL    Total NET: 251 mL      31 Aug 2024 07:01  -  31 Aug 2024 12:21  --------------------------------------------------------  IN:    DOBUTamine: 48 mL    IV PiggyBack: 50 mL    IV PiggyBack: 25 mL    IV PiggyBack: 250 mL    Norepinephrine: 48 mL    Norepinephrine: 66 mL  Total IN: 487 mL    OUT:    Indwelling Catheter - Urethral (mL): 260 mL  Total OUT: 260 mL    Total NET: 227 mL            LABS:                        12.9   14.70 )-----------( 201      ( 31 Aug 2024 04:24 )             36.9     08-31    135  |  100  |  6.6<L>  ----------------------------<  109<H>  3.9   |  19.0<L>  |  0.33<L>    Ca    8.0<L>      31 Aug 2024 10:45  Phos  2.2     08-31  Mg     1.7     08-31    TPro  5.6<L>  /  Alb  3.2<L>  /  TBili  0.7  /  DBili  x   /  AST  154<H>  /  ALT  63<H>  /  AlkPhos  63  08-31      CARDIAC MARKERS ( 31 Aug 2024 00:45 )  x     / x     / x     / x     / 60.8 ng/mL  CARDIAC MARKERS ( 30 Aug 2024 10:21 )  x     / x     / x     / x     / 95.1 ng/mL  CARDIAC MARKERS ( 30 Aug 2024 04:00 )  x     / x     / x     / x     / 93.1 ng/mL  CARDIAC MARKERS ( 29 Aug 2024 22:00 )  x     / x     / x     / x     / 66.1 ng/mL      CAPILLARY BLOOD GLUCOSE      POCT Blood Glucose.: 167 mg/dL (29 Aug 2024 12:40)    PT/INR - ( 29 Aug 2024 12:51 )   PT: 11.2 sec;   INR: 1.01 ratio         PTT - ( 29 Aug 2024 12:51 )  PTT:28.7 sec  Urinalysis Basic - ( 31 Aug 2024 10:45 )    Color: x / Appearance: x / SG: x / pH: x  Gluc: 109 mg/dL / Ketone: x  / Bili: x / Urobili: x   Blood: x / Protein: x / Nitrite: x   Leuk Esterase: x / RBC: x / WBC x   Sq Epi: x / Non Sq Epi: x / Bacteria: x      CULTURES:  Rapid RVP Result: NotDetec (08-31 @ 07:33)  Culture Results:   No growth at 24 hours (08-29 @ 17:48)  Culture Results:   No growth at 24 hours (08-29 @ 16:00)      Physical Examination:    General: No acute distress.    HEENT: Pupils equal, reactive to light.  Symmetric.  PULM: Clear to auscultation bilaterally, no significant sputum production  NECK: Supple, no lymphadenopathy, trachea midline  CVS: Regular rate and rhythm, no murmurs, rubs, or gallops  ABD: Soft, nondistended, nontender, normoactive bowel sounds, no masses  EXT: No edema, nontender  SKIN: Warm and well perfused, no rashes noted.  NEURO: Alert, oriented, interactive, nonfocal      DEVICES:     RADIOLOGY: ...    CRITICAL CARE TIME SPENT: ***

## 2024-08-31 NOTE — PROGRESS NOTE ADULT - ASSESSMENT
NANY RODRIGES is a 23y female avid runner with PMHx multiple prior syncopal/near syncopal episodes with unremarkable cardio work up presents after witnessed out of hospital cardiac arrest. Now, found to have possible aspiration PNA.    PLAN:  #Cardiac Arrest  #Ventricular Arrythmia   #Anoxic Brain Injury  #Hypotension  #Bradycardia  #Myocardial Stunning  #Aspiration PNA      Neuro:  #Anoxic Brain Injury  - Light Sedation with Fentanyl, RASS goal 0/-2  - CT Head reviewed no acute intracranial pathology  - Continue Artic son for cooling to avoid fevers for 24 hours  - Repeat CT Head 8/31  - Continuous video EEG 24 hours, no seizure activity yet  - Daily neuro checks off sedation    CVS:  #Cardiogenic Shock  #Left Ventricular Heart Failure with reduced EF  #Myocardial Stunning after Cardiac Arrest  - Repeat TTE 8/31 shows Left ventricular systolic function is moderately to severely decreased with an ejection fraction visually estimated at 25 to 30 % / trace pericardial effusion  - Wean Levophed as tolerted, titrate to systolic BP >90  - 1.5L fluid restriction  - Continue dobutamine as inotrope   - Cardiac monitor  - Daily EKG, Right axis deviation, RBBB  - CTA no PE  - Follows Dr. Soliman at [facility]  - ICD placement before discharge, cardiac MRI, [ CT coronary vs RHC/LHC to eval for anomalous coronary artery ]  Cardiology following  EP team following    Resp:  - Extubated yesterday  - Saturating >95% on RA  - CXR: Left lung infiltrates, possible aspiration during resuscitation   - CT Chest possible aspiration pneumontits   - continue to monitor on   - Aspiration precautions    GI:  - CT abdomen/pelvis some periportal edema, otherwise normal  - Protonix for GI ppx  - Trending LFTs, mildly elevated AST/ALT, possibly due to hypoperfusion of liver during arrest, continue to monitor  - Ordered Abdominal US   - Sent acute hepatitis panel  - Diet, Dash, passed bedside dysphagia screening    Renal:  - Daily Labs  - Strict I/Os, urine output appropriate  - Continue velasquez now to monitor urine output, possible TOV in afternoon  - Replete lytes as needed    ID:  #Aspiration PNA  Afebrile. trend fever curve  WBC peaked 19.41, now 14.70, possible aspiration PNA   - Procal .71  - CXR reviewed  - Blood cultures negative after 24 hours  - Continue empiric treatment with Zosyn, started 8/29  - UA noninfectious   - Follow up UCx  - RVP negative     Heme:  H/H normal  Coags wnl  DVT ppx SQ heparin    Endo:  - Serum HCG negative  Followup Angiotension Converting Enzyme (Sarcoidosis), TIFFANY, DsDNA ab, EBV, Tickborne panel for work up as possible causes of sudden cardiac arrest/heartblock      Code Status: Full Code  Dispo: Requires MICU level of care at this time    Case discussed with MICU Attending: Dr. Villasenor.   NANY RODRIGES is a 23y female avid runner with PMHx multiple prior syncopal/near syncopal episodes with unremarkable cardio work up presents after witnessed out of hospital cardiac arrest. Now, found to have possible aspiration PNA.    PLAN:  #Cardiac Arrest  #Ventricular Arrythmia   #Anoxic Brain Injury  #Hypotension  #Bradycardia  #Myocardial Stunning  #Aspiration PNA      Neuro:  #Anoxic Brain Injury  - CT Head reviewed no acute intracranial pathology  - Continue Artic son for cooling to avoid fevers for 24 hours  - Repeat CT Head 8/31  - Continuous video EEG 24 hours, no seizure activity yet  - Daily neuro checks off sedation    CVS:  #Cardiogenic Shock  #Left Ventricular Heart Failure with reduced EF  #Myocardial Stunning after Cardiac Arrest  - Repeat TTE 8/31 shows Left ventricular systolic function is moderately to severely decreased with an ejection fraction visually estimated at 25 to 30 % / trace pericardial effusion  - Wean Levophed as tolerted, titrate to systolic BP >90  - 1.5L fluid restriction  - Continue dobutamine as inotrope   - Cardiac monitor  - Daily EKG, Right axis deviation, RBBB  - CTA no PE  - Follows Dr. Soliman at [facility]  - ICD placement before discharge, cardiac MRI, [ CT coronary vs RHC/LHC to eval for anomalous coronary artery ]  Cardiology following  EP team following    Resp:  - Extubated yesterday  - Saturating >95% on RA  - CXR: Left lung infiltrates, possible aspiration during resuscitation   - CT Chest possible aspiration pneumontits   - continue to monitor on   - Aspiration precautions    GI:  - CT abdomen/pelvis some periportal edema, otherwise normal  - Protonix for GI ppx  - Trending LFTs, mildly elevated AST/ALT, possibly due to hypoperfusion of liver during arrest, continue to monitor  - Ordered Abdominal US   - Sent acute hepatitis panel  - Diet, Dash, passed bedside dysphagia screening    Renal:  - Daily Labs  - Strict I/Os, urine output appropriate  - Continue velasquez now to monitor urine output, possible TOV in afternoon  - Replete lytes as needed    ID:  #Aspiration PNA  Afebrile. trend fever curve  WBC peaked 19.41, now 14.70, possible aspiration PNA   - Procal .71  - CXR reviewed  - Blood cultures negative after 24 hours  - Continue empiric treatment with Zosyn, started 8/29  - UA noninfectious   - Follow up UCx  - RVP negative     Heme:  H/H normal  Coags wnl  DVT ppx SQ heparin    Endo:  - Serum HCG negative  Followup Angiotension Converting Enzyme (Sarcoidosis), TIFFANY, DsDNA ab, EBV, Tickborne panel for work up as possible causes of sudden cardiac arrest/heartblock      Code Status: Full Code  Dispo: Requires MICU level of care at this time    Case discussed with MICU Attending: Dr. Villasenor.

## 2024-08-31 NOTE — DIETITIAN INITIAL EVALUATION ADULT - ORAL INTAKE PTA/DIET HISTORY
nutrition consult completed. spoke with pt and family at bedside. No reports of v/c/d, endorses occasional nausea. Poor po intake/appetite today. Diet upgraded as of this morning, NPO d/c'd. Pt refused lunch tray due to poor appetite. Good po intake at meals at home, with healthy/balanced diet reported. Does not follow a specific diet at home, takes MVI, vitamin c, and zinc supplements. No known recent weight change as per patient. agreeable to ensure enlive po BID. NKFA.  nutrition consult completed. spoke with pt and family at bedside. No reports of v/c/d, endorses occasional nausea. Poor po intake/appetite today. Diet upgraded as of this morning, NPO d/c'd. Pt refused lunch tray due to poor appetite. Good po intake at meals at home, with healthy/balanced diet reported. Does not follow a specific diet at home, takes MVI, vitamin c, and zinc supplements. No known recent weight change as per patient. agreeable to ensure enlive po BID. NKFA. consult received for MST Score = />2, does not meet criteria for malnutrition at this time.

## 2024-08-31 NOTE — DIETITIAN INITIAL EVALUATION ADULT - NS FNS DIET ORDER
Diet, DASH/TLC:   Sodium & Cholesterol Restricted  1500mL Fluid Restriction ([id number]) (08-31-24 @ 10:56)

## 2024-09-01 LAB
ALBUMIN SERPL ELPH-MCNC: 3.3 G/DL — SIGNIFICANT CHANGE UP (ref 3.3–5.2)
ALP SERPL-CCNC: 54 U/L — SIGNIFICANT CHANGE UP (ref 40–120)
ALP SERPL-CCNC: 59 U/L — SIGNIFICANT CHANGE UP (ref 40–120)
ALP SERPL-CCNC: 60 U/L — SIGNIFICANT CHANGE UP (ref 40–120)
ALP SERPL-CCNC: 61 U/L — SIGNIFICANT CHANGE UP (ref 40–120)
ALT FLD-CCNC: 52 U/L — HIGH
ALT FLD-CCNC: 56 U/L — HIGH
ANION GAP SERPL CALC-SCNC: 10 MMOL/L — SIGNIFICANT CHANGE UP (ref 5–17)
ANION GAP SERPL CALC-SCNC: 10 MMOL/L — SIGNIFICANT CHANGE UP (ref 5–17)
ANION GAP SERPL CALC-SCNC: 11 MMOL/L — SIGNIFICANT CHANGE UP (ref 5–17)
ANION GAP SERPL CALC-SCNC: 9 MMOL/L — SIGNIFICANT CHANGE UP (ref 5–17)
AST SERPL-CCNC: 109 U/L — HIGH
AST SERPL-CCNC: 78 U/L — HIGH
AST SERPL-CCNC: 92 U/L — HIGH
AST SERPL-CCNC: 98 U/L — HIGH
B BURGDOR C6 AB SER-ACNC: NEGATIVE — SIGNIFICANT CHANGE UP
B BURGDOR IGG+IGM SER-ACNC: 0.24 INDEX — SIGNIFICANT CHANGE UP (ref 0.01–0.9)
BILIRUB SERPL-MCNC: 0.6 MG/DL — SIGNIFICANT CHANGE UP (ref 0.4–2)
BILIRUB SERPL-MCNC: 0.6 MG/DL — SIGNIFICANT CHANGE UP (ref 0.4–2)
BILIRUB SERPL-MCNC: 0.7 MG/DL — SIGNIFICANT CHANGE UP (ref 0.4–2)
BILIRUB SERPL-MCNC: 0.7 MG/DL — SIGNIFICANT CHANGE UP (ref 0.4–2)
BUN SERPL-MCNC: 10.2 MG/DL — SIGNIFICANT CHANGE UP (ref 8–20)
BUN SERPL-MCNC: 6 MG/DL — LOW (ref 8–20)
BUN SERPL-MCNC: 6.8 MG/DL — LOW (ref 8–20)
BUN SERPL-MCNC: 9.3 MG/DL — SIGNIFICANT CHANGE UP (ref 8–20)
CALCIUM SERPL-MCNC: 8.1 MG/DL — LOW (ref 8.4–10.5)
CALCIUM SERPL-MCNC: 8.3 MG/DL — LOW (ref 8.4–10.5)
CALCIUM SERPL-MCNC: 8.4 MG/DL — SIGNIFICANT CHANGE UP (ref 8.4–10.5)
CALCIUM SERPL-MCNC: 8.4 MG/DL — SIGNIFICANT CHANGE UP (ref 8.4–10.5)
CHLORIDE SERPL-SCNC: 102 MMOL/L — SIGNIFICANT CHANGE UP (ref 96–108)
CHLORIDE SERPL-SCNC: 103 MMOL/L — SIGNIFICANT CHANGE UP (ref 96–108)
CHLORIDE SERPL-SCNC: 105 MMOL/L — SIGNIFICANT CHANGE UP (ref 96–108)
CHLORIDE SERPL-SCNC: 105 MMOL/L — SIGNIFICANT CHANGE UP (ref 96–108)
CO2 SERPL-SCNC: 23 MMOL/L — SIGNIFICANT CHANGE UP (ref 22–29)
CO2 SERPL-SCNC: 25 MMOL/L — SIGNIFICANT CHANGE UP (ref 22–29)
CO2 SERPL-SCNC: 26 MMOL/L — SIGNIFICANT CHANGE UP (ref 22–29)
CO2 SERPL-SCNC: 26 MMOL/L — SIGNIFICANT CHANGE UP (ref 22–29)
CREAT SERPL-MCNC: 0.42 MG/DL — LOW (ref 0.5–1.3)
CREAT SERPL-MCNC: 0.42 MG/DL — LOW (ref 0.5–1.3)
CREAT SERPL-MCNC: 0.46 MG/DL — LOW (ref 0.5–1.3)
CREAT SERPL-MCNC: 0.46 MG/DL — LOW (ref 0.5–1.3)
CRP SERPL-MCNC: 69 MG/L — HIGH
DSDNA AB SER-ACNC: <1 IU/ML — SIGNIFICANT CHANGE UP
EGFR: 139 ML/MIN/1.73M2 — SIGNIFICANT CHANGE UP
EGFR: 139 ML/MIN/1.73M2 — SIGNIFICANT CHANGE UP
EGFR: 142 ML/MIN/1.73M2 — SIGNIFICANT CHANGE UP
EGFR: 142 ML/MIN/1.73M2 — SIGNIFICANT CHANGE UP
ERYTHROCYTE [SEDIMENTATION RATE] IN BLOOD: 44 MM/HR — HIGH (ref 0–20)
GAS PNL BLDV: SIGNIFICANT CHANGE UP
GLUCOSE SERPL-MCNC: 133 MG/DL — HIGH (ref 70–99)
GLUCOSE SERPL-MCNC: 137 MG/DL — HIGH (ref 70–99)
GLUCOSE SERPL-MCNC: 148 MG/DL — HIGH (ref 70–99)
GLUCOSE SERPL-MCNC: 163 MG/DL — HIGH (ref 70–99)
HCT VFR BLD CALC: 37 % — SIGNIFICANT CHANGE UP (ref 34.5–45)
HGB BLD-MCNC: 13 G/DL — SIGNIFICANT CHANGE UP (ref 11.5–15.5)
MAGNESIUM SERPL-MCNC: 1.9 MG/DL — SIGNIFICANT CHANGE UP (ref 1.6–2.6)
MCHC RBC-ENTMCNC: 32.2 PG — SIGNIFICANT CHANGE UP (ref 27–34)
MCHC RBC-ENTMCNC: 35.1 GM/DL — SIGNIFICANT CHANGE UP (ref 32–36)
MCV RBC AUTO: 91.6 FL — SIGNIFICANT CHANGE UP (ref 80–100)
NT-PROBNP SERPL-SCNC: 8100 PG/ML — HIGH (ref 0–300)
PHOSPHATE SERPL-MCNC: 2.2 MG/DL — LOW (ref 2.4–4.7)
PLATELET # BLD AUTO: 202 K/UL — SIGNIFICANT CHANGE UP (ref 150–400)
POTASSIUM SERPL-MCNC: 3.9 MMOL/L — SIGNIFICANT CHANGE UP (ref 3.5–5.3)
POTASSIUM SERPL-MCNC: 3.9 MMOL/L — SIGNIFICANT CHANGE UP (ref 3.5–5.3)
POTASSIUM SERPL-MCNC: 4.1 MMOL/L — SIGNIFICANT CHANGE UP (ref 3.5–5.3)
POTASSIUM SERPL-MCNC: 4.1 MMOL/L — SIGNIFICANT CHANGE UP (ref 3.5–5.3)
POTASSIUM SERPL-SCNC: 3.9 MMOL/L — SIGNIFICANT CHANGE UP (ref 3.5–5.3)
POTASSIUM SERPL-SCNC: 3.9 MMOL/L — SIGNIFICANT CHANGE UP (ref 3.5–5.3)
POTASSIUM SERPL-SCNC: 4.1 MMOL/L — SIGNIFICANT CHANGE UP (ref 3.5–5.3)
POTASSIUM SERPL-SCNC: 4.1 MMOL/L — SIGNIFICANT CHANGE UP (ref 3.5–5.3)
PROT SERPL-MCNC: 6 G/DL — LOW (ref 6.6–8.7)
PROT SERPL-MCNC: 6.1 G/DL — LOW (ref 6.6–8.7)
PROT SERPL-MCNC: 6.1 G/DL — LOW (ref 6.6–8.7)
PROT SERPL-MCNC: 6.3 G/DL — LOW (ref 6.6–8.7)
RBC # BLD: 4.04 M/UL — SIGNIFICANT CHANGE UP (ref 3.8–5.2)
RBC # FLD: 12.8 % — SIGNIFICANT CHANGE UP (ref 10.3–14.5)
SODIUM SERPL-SCNC: 136 MMOL/L — SIGNIFICANT CHANGE UP (ref 135–145)
SODIUM SERPL-SCNC: 138 MMOL/L — SIGNIFICANT CHANGE UP (ref 135–145)
SODIUM SERPL-SCNC: 139 MMOL/L — SIGNIFICANT CHANGE UP (ref 135–145)
SODIUM SERPL-SCNC: 142 MMOL/L — SIGNIFICANT CHANGE UP (ref 135–145)
TROPONIN T, HIGH SENSITIVITY RESULT: 215 NG/L — HIGH (ref 0–51)
WBC # BLD: 9 K/UL — SIGNIFICANT CHANGE UP (ref 3.8–10.5)
WBC # FLD AUTO: 9 K/UL — SIGNIFICANT CHANGE UP (ref 3.8–10.5)

## 2024-09-01 PROCEDURE — 99291 CRITICAL CARE FIRST HOUR: CPT | Mod: GC

## 2024-09-01 RX ADMIN — CHLORHEXIDINE GLUCONATE 1 APPLICATION(S): 40 SOLUTION TOPICAL at 17:05

## 2024-09-01 RX ADMIN — POLYETHYLENE GLYCOL 3350 17 GRAM(S): 17 POWDER, FOR SOLUTION ORAL at 11:50

## 2024-09-01 RX ADMIN — ONDANSETRON 4 MILLIGRAM(S): 2 INJECTION, SOLUTION INTRAMUSCULAR; INTRAVENOUS at 08:36

## 2024-09-01 RX ADMIN — ONDANSETRON 4 MILLIGRAM(S): 2 INJECTION, SOLUTION INTRAMUSCULAR; INTRAVENOUS at 17:42

## 2024-09-01 RX ADMIN — Medication 40 MILLIGRAM(S): at 11:50

## 2024-09-01 RX ADMIN — Medication 40 MILLIGRAM(S): at 17:03

## 2024-09-01 RX ADMIN — Medication 2 TABLET(S): at 21:49

## 2024-09-01 RX ADMIN — POVIDONE, PROPYLENE GLYCOL 1 DROP(S): 6.8; 3 LIQUID OPHTHALMIC at 05:13

## 2024-09-01 RX ADMIN — Medication 6 MICROGRAM(S)/KG/MIN: at 01:02

## 2024-09-01 RX ADMIN — PIPERACILLIN SODIUM AND TAZOBACTAM SODIUM 25 GRAM(S): 3; .375 INJECTION, POWDER, FOR SOLUTION INTRAVENOUS at 21:49

## 2024-09-01 RX ADMIN — PIPERACILLIN SODIUM AND TAZOBACTAM SODIUM 25 GRAM(S): 3; .375 INJECTION, POWDER, FOR SOLUTION INTRAVENOUS at 05:14

## 2024-09-01 RX ADMIN — Medication 5000 UNIT(S): at 05:15

## 2024-09-01 RX ADMIN — Medication 40 MILLIGRAM(S): at 05:15

## 2024-09-01 RX ADMIN — PIPERACILLIN SODIUM AND TAZOBACTAM SODIUM 25 GRAM(S): 3; .375 INJECTION, POWDER, FOR SOLUTION INTRAVENOUS at 13:24

## 2024-09-01 RX ADMIN — Medication 5000 UNIT(S): at 17:00

## 2024-09-01 NOTE — PROGRESS NOTE ADULT - SUBJECTIVE AND OBJECTIVE BOX
[facility]                                   [address] [place] [address]                                                      PHONE: [phone]                                                         FAX: [phone]  [email or web address]  ---------------------------------------------------------------------------------------------------------------------------------    Overnight events/patient complaints:  Levo and  requirement decreasing. Mental status improving. No arrhythmia on monitor    Allergy Status Unknown    MEDICATIONS  (STANDING):  artificial  tears Solution 1 Drop(s) Both EYES every 12 hours  chlorhexidine 2% Cloths 1 Application(s) Topical daily  DOBUTamine Infusion 2.5 MICROgram(s)/kG/Min (4.8 mL/Hr) IV Continuous <Continuous>  heparin   Injectable 5000 Unit(s) SubCutaneous every 12 hours  methylPREDNISolone sodium succinate Injectable 40 milliGRAM(s) IV Push every 12 hours  norepinephrine Infusion 0.05 MICROgram(s)/kG/Min (6 mL/Hr) IV Continuous <Continuous>  pantoprazole  Injectable 40 milliGRAM(s) IV Push daily  piperacillin/tazobactam IVPB.. 3.375 Gram(s) IV Intermittent every 8 hours  polyethylene glycol 3350 17 Gram(s) Oral daily  senna 2 Tablet(s) Oral at bedtime    MEDICATIONS  (PRN):  aluminum hydroxide/magnesium hydroxide/simethicone Suspension 30 milliLiter(s) Oral every 4 hours PRN Dyspepsia  metoclopramide Injectable 10 milliGRAM(s) IV Push once PRN Nausea  ondansetron Injectable 4 milliGRAM(s) IV Push every 6 hours PRN Nausea and/or Vomiting  sodium chloride 0.9% lock flush 10 milliLiter(s) IV Push every 1 hour PRN Pre/post blood products, medications, blood draw, and to maintain line patency      Vital Signs Last 24 Hrs  T(C): 36.4 (01 Sep 2024 07:00), Max: 38.1 (31 Aug 2024 20:45)  T(F): 97.6 (01 Sep 2024 07:00), Max: 100.6 (31 Aug 2024 20:45)  HR: 84 (01 Sep 2024 10:45) (57 - 109)  BP: 106/73 (01 Sep 2024 10:45) (100/59 - 114/79)  BP(mean): 82 (01 Sep 2024 10:45) (71 - 90)  RR: 18 (01 Sep 2024 10:45) (13 - 25)  SpO2: 98% (01 Sep 2024 10:45) (95% - 100%)    Parameters below as of 01 Sep 2024 10:45  Patient On (Oxygen Delivery Method): room air      ICU Vital Signs Last 24 Hrs  NANY RODRIGES  I&O's Detail    31 Aug 2024 07:  -  01 Sep 2024 07:00  --------------------------------------------------------  IN:    DOBUTamine: 67.2 mL    DOBUTamine: 67.2 mL    DOBUTamine: 48 mL    IV PiggyBack: 50 mL    IV PiggyBack: 250 mL    IV PiggyBack: 225 mL    Norepinephrine: 48 mL    Norepinephrine: 402.8 mL    Oral Fluid: 240 mL  Total IN: 1398.2 mL    OUT:    Indwelling Catheter - Urethral (mL): 1355 mL    Voided (mL): 1050 mL  Total OUT: 2405 mL    Total NET: -1006.8 mL      01 Sep 2024 07:01  -  01 Sep 2024 11:04  --------------------------------------------------------  IN:    DOBUTamine: 14.4 mL    IV PiggyBack: 75 mL    Norepinephrine: 26.4 mL    Oral Fluid: 400 mL  Total IN: 515.8 mL    OUT:    Voided (mL): 650 mL  Total OUT: 650 mL    Total NET: -134.2 mL        I&O's Summary    31 Aug 2024 07:  -  01 Sep 2024 07:00  --------------------------------------------------------  IN: 1398.2 mL / OUT: 2405 mL / NET: -1006.8 mL    01 Sep 2024 07:01  -  01 Sep 2024 11:04  --------------------------------------------------------  IN: 515.8 mL / OUT: 650 mL / NET: -134.2 mL      Drug Dosing Weight  NANY RODRIGES      PHYSICAL EXAM:  General: Appears comfortable, alert and cooperative.  HEENT: Normocephalic, atraumatic.  Eyes: Pupils reactive, cornea wnl.  Neck: Supple, no nodes adenopathy; no JVD, carotid bruit or thyromegaly.  CARDIOVASCULAR: Regular S1 S2 with no murmur, rub, gallop or lift.   LUNGS: No rales, rhonchi or wheeze. Normal breath sounds bilaterally.  ABDOMEN: Soft, nontender without mass or organomegaly. bowel sounds normoactive.  EXTREMITIES: No clubbing, cyanosis or edema. Distal pulses wnl.   SKIN: Warm and dry with normal turgor.  NEURO: Awake and alert, face symmetric, moving all extremities  PSYCH: Appropriate affect        LABS:                        13.0   9.00  )-----------(       ( 01 Sep 2024 06:17 )             37.0     09    142  |  105  |  6.8<L>  ----------------------------<  133<H>  3.9   |  26.0  |  0.46<L>    Ca    8.3<L>      01 Sep 2024 06:17  Phos  2.2       Mg     1.9         TPro  6.1<L>  /  Alb  3.3  /  TBili  0.7  /  DBili  x   /  AST  98<H>  /  ALT  56<H>  /  AlkPhos  61      NANY RODRIGES  CARDIAC MARKERS ( 31 Aug 2024 00:45 )  x     / x     / x     / x     / 60.8 ng/mL        Urinalysis Basic - ( 01 Sep 2024 06:17 )    Color: x / Appearance: x / SG: x / pH: x  Gluc: 133 mg/dL / Ketone: x  / Bili: x / Urobili: x   Blood: x / Protein: x / Nitrite: x   Leuk Esterase: x / RBC: x / WBC x   Sq Epi: x / Non Sq Epi: x / Bacteria: x        RADIOLOGY & ADDITIONAL STUDIES:    INTERPRETATION OF TELEMETRY (personally reviewed):  SR, ST    ECHO:  < from: TTE W or WO Ultrasound Enhancing Agent (24 @ 10:10) >  CONCLUSIONS:      1. Left ventricular systolic function is moderately to severely decreased with an ejection fraction visually estimated at 25 to 30 %.   2. Multiple segmental abnormalities exist. See findings.   3. Normal right ventricular cavity size and normal right ventricular systolic function.   4. Trace pericardial effusion noted adjacent to the posterior left ventricle.   5. Compared to the transthoracic echocardiogram performed on 2024, there is slight worsening of the LVEF.    < end of copied text >    ASSESSMENT AND PLAN:  In summary, NANY RODRIGES is an 23y Female with intermittent RBBB, syncope after intense exercise in the past, negative TTT who presented with PEA and VT arrest s/p hypothermia protocol, now with new LV dysfunction on dobutamine and levophed    - Wean dobutamine and levophed as tolerated  - Low suspicion for myocarditis, repeat trop downtrending  - Eventual coronary evaluation and cMRI  - ICD prior to discharge

## 2024-09-01 NOTE — PROGRESS NOTE ADULT - CRITICAL CARE ATTENDING COMMENT
greater than 50% of time spent reviewing labs, notes, orders and radiographs, coordinating care  discussed with nursing, ICU team, consultant  critically ill patient, actively ttirating pressor and inotrope, high risk for decompensation
greater than 50% of time spent reviewing labs, notes, orders and radiographs, coordinating care  discussed with nursing, ICU team consultant  critically ill patient, actively titrating pressor, high risk for deterioration

## 2024-09-01 NOTE — PROGRESS NOTE ADULT - SUBJECTIVE AND OBJECTIVE BOX
Patient is a 23y old  Female who presents with a chief complaint of Cardiac Arrest/ Cardiogenic Shock/ Aspiration PNA/ Anoxic Brain Injury (31 Aug 2024 12:20)      BRIEF HOSPITAL COURSE:  23 year old PMHx multiple syncopal episodes during strenuous exercise presents with witnessed syncopal episode/cardiac arrest. Patient was running with her boyfriend, on mile 8 patient sat down on a bench and syncopated. Patients boyfriend states patient was unresponsive, tried elevating legs with no improvement, states unsure if patient had a pulse. Called EMS, who arrived to the scene 5 mins, boyfriend did not provide CPR while awaiting EMS. Patient was found PEA, 3 rounds of CPR, 1 epi in the field, found to be in VTach and defibrillated. Upon arrival to ED, sinus bradycardia in 30s, atropine was given with improvement in HR, patient intubated for airway protection. CXR shows left sided infiltrates. CT chest shows possible aspiration PNA. Started on Zosyn 8/29. Continuous EEG without epileptiform activity. TTE completed yesterday EF of 30-35%, dyskinesis of apex of heart. Complete 24 hours total of continuous EEG. Trial off sedition yesterday, patient following commands, making purposeful movements. Still requiring Levo and dobutamine for pressure and inotropic support.    Interval HPI:  Pt was seen at bedside in AM  A&Ox2  no acute medical complaints  pressor supports down, levo @0.04 this morning    PAST MEDICAL & SURGICAL HISTORY:  Syncopal episodes      Review of Systems:  CONSTITUTIONAL: No fever, chills, or fatigue  EYES: No eye pain, visual disturbances, or discharge  ENMT:  No difficulty hearing, tinnitus, vertigo; No sinus or throat pain  RESPIRATORY: No cough, wheezing, chills or hemoptysis; No shortness of breath  CARDIOVASCULAR: No chest pain, palpitations, dizziness, or leg swelling  GASTROINTESTINAL: No abdominal or epigastric pain. No nausea, vomiting, or hematemesis; No diarrhea or constipation.  GENITOURINARY: No dysuria, frequency, hematuria, or incontinence  NEUROLOGICAL: No headaches, memory loss, loss of strength, or numbness  SKIN: No rash  MUSCULOSKELETAL: No joint pain or swelling; No muscle, back, or extremity pain  PSYCHIATRIC: No depression, anxiety, mood swings, or difficulty sleeping      Medications:  piperacillin/tazobactam IVPB.. 3.375 Gram(s) IV Intermittent every 8 hours    DOBUTamine Infusion 2.5 MICROgram(s)/kG/Min IV Continuous <Continuous>  norepinephrine Infusion 0.05 MICROgram(s)/kG/Min IV Continuous <Continuous>      metoclopramide Injectable 10 milliGRAM(s) IV Push once PRN  ondansetron Injectable 4 milliGRAM(s) IV Push every 6 hours PRN      heparin   Injectable 5000 Unit(s) SubCutaneous every 12 hours    aluminum hydroxide/magnesium hydroxide/simethicone Suspension 30 milliLiter(s) Oral every 4 hours PRN  pantoprazole  Injectable 40 milliGRAM(s) IV Push daily  polyethylene glycol 3350 17 Gram(s) Oral daily  senna 2 Tablet(s) Oral at bedtime      methylPREDNISolone sodium succinate Injectable 40 milliGRAM(s) IV Push every 12 hours    sodium chloride 0.9% lock flush 10 milliLiter(s) IV Push every 1 hour PRN      artificial  tears Solution 1 Drop(s) Both EYES every 12 hours  chlorhexidine 2% Cloths 1 Application(s) Topical daily            ICU Vital Signs Last 24 Hrs  T(C): 36.4 (01 Sep 2024 07:00), Max: 38.1 (31 Aug 2024 20:45)  T(F): 97.6 (01 Sep 2024 07:00), Max: 100.6 (31 Aug 2024 20:45)  HR: 77 (01 Sep 2024 10:30) (57 - 109)  BP: 106/71 (01 Sep 2024 10:30) (100/59 - 115/76)  BP(mean): 81 (01 Sep 2024 10:30) (71 - 90)  ABP: 85/51 (01 Sep 2024 10:30) (82/55 - 117/68)  ABP(mean): 63 (01 Sep 2024 10:30) (63 - 82)  RR: 16 (01 Sep 2024 10:30) (13 - 25)  SpO2: 100% (01 Sep 2024 10:30) (95% - 100%)    O2 Parameters below as of 01 Sep 2024 10:30  Patient On (Oxygen Delivery Method): room air      ABG - ( 31 Aug 2024 04:21 )  pH, Arterial: 7.400 pH, Blood: x     /  pCO2: 33    /  pO2: 81    / HCO3: 20    / Base Excess: -4.4  /  SaO2: 98.6        I&O's Detail    31 Aug 2024 07:01  -  01 Sep 2024 07:00  --------------------------------------------------------  IN:    DOBUTamine: 67.2 mL    DOBUTamine: 67.2 mL    DOBUTamine: 48 mL    IV PiggyBack: 50 mL    IV PiggyBack: 250 mL    IV PiggyBack: 225 mL    Norepinephrine: 48 mL    Norepinephrine: 402.8 mL    Oral Fluid: 240 mL  Total IN: 1398.2 mL    OUT:    Indwelling Catheter - Urethral (mL): 1355 mL    Voided (mL): 1050 mL  Total OUT: 2405 mL    Total NET: -1006.8 mL      01 Sep 2024 07:01  -  01 Sep 2024 10:57  --------------------------------------------------------  IN:    DOBUTamine: 14.4 mL    IV PiggyBack: 75 mL    Norepinephrine: 26.4 mL    Oral Fluid: 400 mL  Total IN: 515.8 mL    OUT:    Voided (mL): 650 mL  Total OUT: 650 mL    Total NET: -134.2 mL      LABS:                        13.0   9.00  )-----------( 202      ( 01 Sep 2024 06:17 )             37.0     09-01    142  |  105  |  6.8<L>  ----------------------------<  133<H>  3.9   |  26.0  |  0.46<L>    Ca    8.3<L>      01 Sep 2024 06:17  Phos  2.2     09-01  Mg     1.9     09-01    TPro  6.1<L>  /  Alb  3.3  /  TBili  0.7  /  DBili  x   /  AST  98<H>  /  ALT  56<H>  /  AlkPhos  61  09-01      CARDIAC MARKERS ( 31 Aug 2024 00:45 )  x     / x     / x     / x     / 60.8 ng/mL      CAPILLARY BLOOD GLUCOSE          Urinalysis Basic - ( 01 Sep 2024 06:17 )    Color: x / Appearance: x / SG: x / pH: x  Gluc: 133 mg/dL / Ketone: x  / Bili: x / Urobili: x   Blood: x / Protein: x / Nitrite: x   Leuk Esterase: x / RBC: x / WBC x   Sq Epi: x / Non Sq Epi: x / Bacteria: x      CULTURES:  Rapid RVP Result: NotDetec (08-31 @ 07:33)  Culture Results:   No growth at 48 Hours (08-29 @ 17:48)  Culture Results:   No growth at 48 Hours (08-29 @ 16:00)      Physical Examination:    General: No acute distress.    HEENT: Pupils equal, reactive to light.  Symmetric.  PULM: Clear to auscultation bilaterally, no significant sputum production  NECK: Supple, no lymphadenopathy, trachea midline  CVS: Regular rate and rhythm, no murmurs, rubs, or gallops  ABD: Soft, nondistended, nontender, normoactive bowel sounds, no masses  EXT: No edema, nontender  SKIN: Warm and well perfused, no rashes noted.  NEURO: Alert, oriented, interactive, nonfocal      < from: US Abdomen Complete (US Abdomen Complete .) (08.31.24 @ 12:10) >  IMPRESSION:  No acute sonographic abnormality.    < end of copied text >   Patient is a 23y old  Female who presents with a chief complaint of Cardiac Arrest/ Cardiogenic Shock/ Aspiration PNA/ Anoxic Brain Injury (31 Aug 2024 12:20)      BRIEF HOSPITAL COURSE:  23 year old PMHx multiple syncopal episodes during strenuous exercise presents with witnessed syncopal episode/cardiac arrest. Patient was running with her boyfriend, on mile 8 patient sat down on a bench and syncopated. Patients boyfriend states patient was unresponsive, tried elevating legs with no improvement, states unsure if patient had a pulse. Called EMS, who arrived to the scene 5 mins, boyfriend did not provide CPR while awaiting EMS. Patient was found PEA, 3 rounds of CPR, 1 epi in the field, found to be in VTach and defibrillated. Upon arrival to ED, sinus bradycardia in 30s, atropine was given with improvement in HR, patient intubated for airway protection. CXR shows left sided infiltrates. CT chest shows possible aspiration PNA. Started on Zosyn 8/29. Continuous EEG without epileptiform activity. TTE completed yesterday EF of 30-35%, dyskinesis of apex of heart. Complete 24 hours total of continuous EEG. Trial off sedition yesterday, patient following commands, making purposeful movements. Still requiring Levo and dobutamine for pressure and inotropic support.    Interval HPI:  Pt was seen at bedside in AM  A&Ox2  no acute medical complaints  pressor supports down, levo @0.04 this morning  cardiac imaging postponed for today due to needing HR to be <60    PAST MEDICAL & SURGICAL HISTORY:  Syncopal episodes      Review of Systems:  CONSTITUTIONAL: No fever, chills, or fatigue  EYES: No eye pain, visual disturbances, or discharge  ENMT:  No difficulty hearing, tinnitus, vertigo; No sinus or throat pain  RESPIRATORY: No cough, wheezing, chills or hemoptysis; No shortness of breath  CARDIOVASCULAR: No chest pain, palpitations, dizziness, or leg swelling  GASTROINTESTINAL: No abdominal or epigastric pain. No nausea, vomiting, or hematemesis; No diarrhea or constipation.  GENITOURINARY: No dysuria, frequency, hematuria, or incontinence  NEUROLOGICAL: No headaches, memory loss, loss of strength, or numbness  SKIN: No rash  MUSCULOSKELETAL: No joint pain or swelling; No muscle, back, or extremity pain  PSYCHIATRIC: No depression, anxiety, mood swings, or difficulty sleeping      Medications:  piperacillin/tazobactam IVPB.. 3.375 Gram(s) IV Intermittent every 8 hours    DOBUTamine Infusion 2.5 MICROgram(s)/kG/Min IV Continuous <Continuous>  norepinephrine Infusion 0.05 MICROgram(s)/kG/Min IV Continuous <Continuous>      metoclopramide Injectable 10 milliGRAM(s) IV Push once PRN  ondansetron Injectable 4 milliGRAM(s) IV Push every 6 hours PRN      heparin   Injectable 5000 Unit(s) SubCutaneous every 12 hours    aluminum hydroxide/magnesium hydroxide/simethicone Suspension 30 milliLiter(s) Oral every 4 hours PRN  pantoprazole  Injectable 40 milliGRAM(s) IV Push daily  polyethylene glycol 3350 17 Gram(s) Oral daily  senna 2 Tablet(s) Oral at bedtime      methylPREDNISolone sodium succinate Injectable 40 milliGRAM(s) IV Push every 12 hours    sodium chloride 0.9% lock flush 10 milliLiter(s) IV Push every 1 hour PRN      artificial  tears Solution 1 Drop(s) Both EYES every 12 hours  chlorhexidine 2% Cloths 1 Application(s) Topical daily            ICU Vital Signs Last 24 Hrs  T(C): 36.4 (01 Sep 2024 07:00), Max: 38.1 (31 Aug 2024 20:45)  T(F): 97.6 (01 Sep 2024 07:00), Max: 100.6 (31 Aug 2024 20:45)  HR: 77 (01 Sep 2024 10:30) (57 - 109)  BP: 106/71 (01 Sep 2024 10:30) (100/59 - 115/76)  BP(mean): 81 (01 Sep 2024 10:30) (71 - 90)  ABP: 85/51 (01 Sep 2024 10:30) (82/55 - 117/68)  ABP(mean): 63 (01 Sep 2024 10:30) (63 - 82)  RR: 16 (01 Sep 2024 10:30) (13 - 25)  SpO2: 100% (01 Sep 2024 10:30) (95% - 100%)    O2 Parameters below as of 01 Sep 2024 10:30  Patient On (Oxygen Delivery Method): room air      ABG - ( 31 Aug 2024 04:21 )  pH, Arterial: 7.400 pH, Blood: x     /  pCO2: 33    /  pO2: 81    / HCO3: 20    / Base Excess: -4.4  /  SaO2: 98.6        I&O's Detail    31 Aug 2024 07:01  -  01 Sep 2024 07:00  --------------------------------------------------------  IN:    DOBUTamine: 67.2 mL    DOBUTamine: 67.2 mL    DOBUTamine: 48 mL    IV PiggyBack: 50 mL    IV PiggyBack: 250 mL    IV PiggyBack: 225 mL    Norepinephrine: 48 mL    Norepinephrine: 402.8 mL    Oral Fluid: 240 mL  Total IN: 1398.2 mL    OUT:    Indwelling Catheter - Urethral (mL): 1355 mL    Voided (mL): 1050 mL  Total OUT: 2405 mL    Total NET: -1006.8 mL      01 Sep 2024 07:01  -  01 Sep 2024 10:57  --------------------------------------------------------  IN:    DOBUTamine: 14.4 mL    IV PiggyBack: 75 mL    Norepinephrine: 26.4 mL    Oral Fluid: 400 mL  Total IN: 515.8 mL    OUT:    Voided (mL): 650 mL  Total OUT: 650 mL    Total NET: -134.2 mL      LABS:                        13.0   9.00  )-----------( 202      ( 01 Sep 2024 06:17 )             37.0     09-01    142  |  105  |  6.8<L>  ----------------------------<  133<H>  3.9   |  26.0  |  0.46<L>    Ca    8.3<L>      01 Sep 2024 06:17  Phos  2.2     09-01  Mg     1.9     09-01    TPro  6.1<L>  /  Alb  3.3  /  TBili  0.7  /  DBili  x   /  AST  98<H>  /  ALT  56<H>  /  AlkPhos  61  09-01      CARDIAC MARKERS ( 31 Aug 2024 00:45 )  x     / x     / x     / x     / 60.8 ng/mL      CAPILLARY BLOOD GLUCOSE          Urinalysis Basic - ( 01 Sep 2024 06:17 )    Color: x / Appearance: x / SG: x / pH: x  Gluc: 133 mg/dL / Ketone: x  / Bili: x / Urobili: x   Blood: x / Protein: x / Nitrite: x   Leuk Esterase: x / RBC: x / WBC x   Sq Epi: x / Non Sq Epi: x / Bacteria: x      CULTURES:  Rapid RVP Result: NotDetec (08-31 @ 07:33)  Culture Results:   No growth at 48 Hours (08-29 @ 17:48)  Culture Results:   No growth at 48 Hours (08-29 @ 16:00)      Physical Examination:    General: No acute distress.    HEENT: Pupils equal, reactive to light.  Symmetric.  PULM: Clear to auscultation bilaterally, no significant sputum production  NECK: Supple, no lymphadenopathy, trachea midline  CVS: Regular rate and rhythm, no murmurs, rubs, or gallops  ABD: Soft, nondistended, nontender, normoactive bowel sounds, no masses  EXT: No edema, nontender  SKIN: Warm and well perfused, no rashes noted.  NEURO: Alert, oriented, interactive, nonfocal      < from: US Abdomen Complete (US Abdomen Complete .) (08.31.24 @ 12:10) >  IMPRESSION:  No acute sonographic abnormality.    < end of copied text >

## 2024-09-01 NOTE — PROGRESS NOTE ADULT - ASSESSMENT
NANY RODRIGES is a 23y female avid runner with PMHx multiple prior syncopal/near syncopal episodes with unremarkable cardio work up presents after witnessed out of hospital cardiac arrest. Pt also found to have possible aspiration PNA.    PLAN:  #Cardiac Arrest  #Ventricular Arrythmia   #Anoxic Brain Injury  #Hypotension  #Bradycardia  #Myocardial Stunning  #Aspiration PNA      Neuro:  #Anoxic Brain Injury  - CT Head reviewed no acute intracranial pathology  - Continue Artic son for cooling to avoid fevers for 24 hours  - Continuous video EEG 24 hours, no seizure activity yet  - Daily neuro checks off sedation    CVS:  #Cardiogenic Shock  #Left Ventricular Heart Failure with reduced EF  #Myocardial Stunning after Cardiac Arrest  - Repeat TTE 8/31 shows Left ventricular systolic function is moderately to severely decreased with an ejection fraction visually estimated at 25 to 30 % / trace pericardial effusion  - Wean Levophed as tolerted, titrate to systolic BP >90  - 1.5L fluid restriction  - Continue dobutamine as inotrope   - Cardiac monitor  - Daily EKG, Right axis deviation, RBBB  - CTA no PE  - Follows Dr. Soliman at [facility]  - ICD placement before discharge, cardiac MRI, [ CT coronary vs RHC/LHC to eval for anomalous coronary artery ]  Cardiology following  EP team following    Resp:  - s/p extubation 8/30  - Saturating >95% on RA  - CXR: Left lung infiltrates, possible aspiration during resuscitation   - CT Chest possible aspiration pneumontits   - continue to monitor on   - Aspiration precautions    GI:  - CT abdomen/pelvis some periportal edema, otherwise normal  - Protonix for GI ppx  - Trending LFTs, mildly elevated AST/ALT, possibly due to hypoperfusion of liver during arrest, continue to monitor-currently downtrending  - abdominal US unremarkable  - hepatitis A, B, and C negative  - Diet, Dash, passed bedside dysphagia screening    Renal:  - Daily Labs  - Strict I/Os, urine output appropriate  - Continue velasquez now to monitor urine output, possible TOV in afternoon  - Replete lytes as needed    ID:  #Aspiration PNA  Afebrile. trend fever curve  WBC peaked 19.41, now 9, possible aspiration PNA   - Procal 0.71  - CXR reviewed  - Blood cultures negative after 24 hours  - Continue empiric treatment with Zosyn, started 8/29  - UA noninfectious   - Follow up sputum culture  - RVP negative     Heme:  H/H normal  Coags wnl  DVT ppx SQ heparin    Endo:  - Serum HCG negative  - Followup Angiotension Converting Enzyme (Sarcoidosis), TIFFANY, DsDNA ab, EBV, Tickborne panel for work up as possible causes of sudden cardiac arrest/heartblock      Code Status: Full Code  Dispo: Requires MICU level of care at this time    Case discussed with MICU Attending: Dr. Villasenor. NANY RODRIGES is a 23y female avid runner with PMHx multiple prior syncopal/near syncopal episodes with unremarkable cardio work up presents after witnessed out of hospital cardiac arrest. Pt also found to have possible aspiration PNA.    PLAN:  #Cardiac Arrest  #Ventricular Arrythmia   #Anoxic Brain Injury  #Hypotension  #Bradycardia  #Myocardial Stunning  #Aspiration PNA      Neuro:  #Anoxic Brain Injury  - CT Head reviewed no acute intracranial pathology  - Continue Artic son for cooling to avoid fevers for 24 hours  - Continuous video EEG 24 hours, no seizure activity yet  - Daily neuro checks off sedation    CVS:  #Cardiogenic Shock  #Left Ventricular Heart Failure with reduced EF  #Myocardial Stunning after Cardiac Arrest  - Repeat TTE 8/31 shows Left ventricular systolic function is moderately to severely decreased with an ejection fraction visually estimated at 25 to 30 % / trace pericardial effusion  - Wean Levophed as tolerted, titrate to systolic BP >90  - 1.5L fluid restriction  - Continue dobutamine as inotrope   - Cardiac monitor  - Daily EKG, Right axis deviation, RBBB  - CTA no PE  - Follows Dr. Soliman at [facility]  - ICD placement before discharge, cardiac MRI, [ CT coronary vs RHC/LHC to eval for anomalous coronary artery ]  - pending CTA and MR cardiac  Cardiology following  EP team following    Resp:  - s/p extubation 8/30  - Saturating >95% on RA  - CXR: Left lung infiltrates, possible aspiration during resuscitation   - CT Chest possible aspiration pneumontits   - continue to monitor on   - Aspiration precautions    GI:  - CT abdomen/pelvis some periportal edema, otherwise normal  - Protonix for GI ppx  - Trending LFTs, mildly elevated AST/ALT, possibly due to hypoperfusion of liver during arrest, continue to monitor-currently downtrending  - abdominal US unremarkable  - hepatitis A, B, and C negative  - Diet, Dash, passed bedside dysphagia screening    Renal:  - Daily Labs  - Strict I/Os, urine output appropriate  - Continue velasquez now to monitor urine output, possible TOV in afternoon  - Replete lytes as needed    ID:  #Aspiration PNA  Afebrile. trend fever curve  WBC peaked 19.41, now 9, possible aspiration PNA   - Procal 0.71  - CXR reviewed  - Blood cultures negative after 24 hours  - Continue empiric treatment with Zosyn, started 8/29  - UA noninfectious   - Follow up sputum culture  - RVP negative     Heme:  H/H normal  Coags wnl  DVT ppx SQ heparin    Endo:  - Serum HCG negative  - Followup Angiotension Converting Enzyme (Sarcoidosis), TIFFANY, DsDNA ab, EBV, Tickborne panel for work up as possible causes of sudden cardiac arrest/heartblock      Code Status: Full Code  Dispo: Requires MICU level of care at this time    Case discussed with MICU Attending: Dr. Villasenor.

## 2024-09-02 LAB
ALBUMIN SERPL ELPH-MCNC: 3.4 G/DL — SIGNIFICANT CHANGE UP (ref 3.3–5.2)
ALP SERPL-CCNC: 55 U/L — SIGNIFICANT CHANGE UP (ref 40–120)
ALT FLD-CCNC: 48 U/L — HIGH
ANION GAP SERPL CALC-SCNC: 10 MMOL/L — SIGNIFICANT CHANGE UP (ref 5–17)
AST SERPL-CCNC: 63 U/L — HIGH
BASOPHILS # BLD AUTO: 0 K/UL — SIGNIFICANT CHANGE UP (ref 0–0.2)
BASOPHILS NFR BLD AUTO: 0 % — SIGNIFICANT CHANGE UP (ref 0–2)
BILIRUB SERPL-MCNC: 0.6 MG/DL — SIGNIFICANT CHANGE UP (ref 0.4–2)
BUN SERPL-MCNC: 11.3 MG/DL — SIGNIFICANT CHANGE UP (ref 8–20)
CALCIUM SERPL-MCNC: 8.6 MG/DL — SIGNIFICANT CHANGE UP (ref 8.4–10.5)
CHLORIDE SERPL-SCNC: 103 MMOL/L — SIGNIFICANT CHANGE UP (ref 96–108)
CO2 SERPL-SCNC: 24 MMOL/L — SIGNIFICANT CHANGE UP (ref 22–29)
CREAT SERPL-MCNC: 0.41 MG/DL — LOW (ref 0.5–1.3)
EBV DNA SERPL NAA+PROBE-ACNC: SIGNIFICANT CHANGE UP IU/ML
EBVPCR LOG: SIGNIFICANT CHANGE UP LOG10IU/ML
EGFR: 143 ML/MIN/1.73M2 — SIGNIFICANT CHANGE UP
EOSINOPHIL # BLD AUTO: 0.01 K/UL — SIGNIFICANT CHANGE UP (ref 0–0.5)
EOSINOPHIL NFR BLD AUTO: 0.1 % — SIGNIFICANT CHANGE UP (ref 0–6)
GAS PNL BLDA: SIGNIFICANT CHANGE UP
GAS PNL BLDV: SIGNIFICANT CHANGE UP
GLUCOSE SERPL-MCNC: 123 MG/DL — HIGH (ref 70–99)
HCT VFR BLD CALC: 35.4 % — SIGNIFICANT CHANGE UP (ref 34.5–45)
HGB BLD-MCNC: 11.9 G/DL — SIGNIFICANT CHANGE UP (ref 11.5–15.5)
IMM GRANULOCYTES NFR BLD AUTO: 0.5 % — SIGNIFICANT CHANGE UP (ref 0–0.9)
INR BLD: 1.11 RATIO — SIGNIFICANT CHANGE UP (ref 0.85–1.18)
LYMPHOCYTES # BLD AUTO: 0.88 K/UL — LOW (ref 1–3.3)
LYMPHOCYTES # BLD AUTO: 9.4 % — LOW (ref 13–44)
MAGNESIUM SERPL-MCNC: 2 MG/DL — SIGNIFICANT CHANGE UP (ref 1.6–2.6)
MCHC RBC-ENTMCNC: 31.5 PG — SIGNIFICANT CHANGE UP (ref 27–34)
MCHC RBC-ENTMCNC: 33.6 GM/DL — SIGNIFICANT CHANGE UP (ref 32–36)
MCV RBC AUTO: 93.7 FL — SIGNIFICANT CHANGE UP (ref 80–100)
MONOCYTES # BLD AUTO: 0.6 K/UL — SIGNIFICANT CHANGE UP (ref 0–0.9)
MONOCYTES NFR BLD AUTO: 6.4 % — SIGNIFICANT CHANGE UP (ref 2–14)
NEUTROPHILS # BLD AUTO: 7.87 K/UL — HIGH (ref 1.8–7.4)
NEUTROPHILS NFR BLD AUTO: 83.6 % — HIGH (ref 43–77)
PHOSPHATE SERPL-MCNC: 2.8 MG/DL — SIGNIFICANT CHANGE UP (ref 2.4–4.7)
PLATELET # BLD AUTO: 195 K/UL — SIGNIFICANT CHANGE UP (ref 150–400)
POTASSIUM SERPL-MCNC: 3.8 MMOL/L — SIGNIFICANT CHANGE UP (ref 3.5–5.3)
POTASSIUM SERPL-SCNC: 3.8 MMOL/L — SIGNIFICANT CHANGE UP (ref 3.5–5.3)
PROT SERPL-MCNC: 6 G/DL — LOW (ref 6.6–8.7)
PROTHROM AB SERPL-ACNC: 12.3 SEC — SIGNIFICANT CHANGE UP (ref 9.5–13)
RBC # BLD: 3.78 M/UL — LOW (ref 3.8–5.2)
RBC # FLD: 13 % — SIGNIFICANT CHANGE UP (ref 10.3–14.5)
SODIUM SERPL-SCNC: 137 MMOL/L — SIGNIFICANT CHANGE UP (ref 135–145)
WBC # BLD: 9.41 K/UL — SIGNIFICANT CHANGE UP (ref 3.8–10.5)
WBC # FLD AUTO: 9.41 K/UL — SIGNIFICANT CHANGE UP (ref 3.8–10.5)

## 2024-09-02 PROCEDURE — 99233 SBSQ HOSP IP/OBS HIGH 50: CPT

## 2024-09-02 PROCEDURE — 93010 ELECTROCARDIOGRAM REPORT: CPT

## 2024-09-02 RX ORDER — SODIUM PHOSPHATE, DIBASIC, ANHYDROUS, POTASSIUM PHOSPHATE, MONOBASIC, AND SODIUM PHOSPHATE, MONOBASIC, MONOHYDRATE 852; 155; 130 MG/1; MG/1; MG/1
1 TABLET, COATED ORAL ONCE
Refills: 0 | Status: COMPLETED | OUTPATIENT
Start: 2024-09-02 | End: 2024-09-02

## 2024-09-02 RX ORDER — SODIUM PHOSPHATE, DIBASIC, ANHYDROUS, POTASSIUM PHOSPHATE, MONOBASIC, AND SODIUM PHOSPHATE, MONOBASIC, MONOHYDRATE 852; 155; 130 MG/1; MG/1; MG/1
1 TABLET, COATED ORAL EVERY 4 HOURS
Refills: 0 | Status: DISCONTINUED | OUTPATIENT
Start: 2024-09-02 | End: 2024-09-02

## 2024-09-02 RX ADMIN — Medication 40 MILLIGRAM(S): at 17:05

## 2024-09-02 RX ADMIN — PIPERACILLIN SODIUM AND TAZOBACTAM SODIUM 25 GRAM(S): 3; .375 INJECTION, POWDER, FOR SOLUTION INTRAVENOUS at 13:40

## 2024-09-02 RX ADMIN — CHLORHEXIDINE GLUCONATE 1 APPLICATION(S): 40 SOLUTION TOPICAL at 11:43

## 2024-09-02 RX ADMIN — PIPERACILLIN SODIUM AND TAZOBACTAM SODIUM 25 GRAM(S): 3; .375 INJECTION, POWDER, FOR SOLUTION INTRAVENOUS at 22:24

## 2024-09-02 RX ADMIN — Medication 40 MILLIGRAM(S): at 05:32

## 2024-09-02 RX ADMIN — Medication 40 MILLIGRAM(S): at 11:41

## 2024-09-02 RX ADMIN — ONDANSETRON 4 MILLIGRAM(S): 2 INJECTION, SOLUTION INTRAMUSCULAR; INTRAVENOUS at 03:46

## 2024-09-02 RX ADMIN — Medication 5000 UNIT(S): at 05:32

## 2024-09-02 RX ADMIN — SODIUM PHOSPHATE, DIBASIC, ANHYDROUS, POTASSIUM PHOSPHATE, MONOBASIC, AND SODIUM PHOSPHATE, MONOBASIC, MONOHYDRATE 1 PACKET(S): 852; 155; 130 TABLET, COATED ORAL at 05:33

## 2024-09-02 RX ADMIN — Medication 5000 UNIT(S): at 17:05

## 2024-09-02 RX ADMIN — PIPERACILLIN SODIUM AND TAZOBACTAM SODIUM 25 GRAM(S): 3; .375 INJECTION, POWDER, FOR SOLUTION INTRAVENOUS at 05:32

## 2024-09-02 NOTE — PROCEDURE NOTE - GENERAL PROCEDURE DETAILS
Dressing taken down, sutures removed, catheter removed, direct pressure with two fingers held for 10 mins
Dressing taken down, sutures removed, site prepped, catheter removed and direct pressure to site held for 10 mins

## 2024-09-02 NOTE — PROGRESS NOTE ADULT - ASSESSMENT
NANY RODRIGES is a 23y female avid runner with PMHx multiple prior syncopal/near syncopal episodes with unremarkable cardio work up presents after witnessed out of hospital cardiac arrest. Found to have aspiration PNA. Now, off pressors and inotropic therapy, still with reduced ejection fraction on US. Completing course of Zosyn for PNA. Pending cardiac MR, CT coronary, and ICD placement      PLAN:  #Cardiac Arrest  #Ventricular Arrythmia   #Anoxic Brain Injury  #Hypotension  #Bradycardia  #Myocardial Stunning  #Aspiration PNA      Neuro:  #Anoxic Brain Injury  - CT Head reviewed no acute intracranial pathology  - Continue Artic son for cooling to avoid fevers for 24 hours  - Continuous video EEG 24 hours, no seizure activity yet  - Daily neuro checks off sedation    CVS:  #Cardiogenic Shock  #Left Ventricular Heart Failure with reduced EF  #Myocardial Stunning after Cardiac Arrest  - 1.5L fluid restriction  - Repeat TTE 8/31 shows Left ventricular systolic function is moderately to severely decreased with an ejection fraction visually estimated at 25 to 30 % / trace pericardial effusion  - Bedside POCUS 9/2 grossly reduced EF, cardiac apex hypokinetic  - Weaned off Levophed 9/1 AM, Weaned off dobutamine 9/2 AM  - Continue high dose steroids as treatment for autoimmune conditions, myocarditis  - Cardiac monitor  - Daily EKG, Right axis deviation, RBBB  - CTA on admission, no PE  - Follows Dr. Soliman at [facility]  - ICD placement before discharge, pending CTA cornoary and Cardiac MRI  Cardiology following  EP team following    Resp:  - s/p extubation 8/30  - Saturating >95% on RA  - CXR: Left lung infiltrates, possible aspiration during resuscitation   - CT Chest possible aspiration PNA  - continue to monitor on   - Aspiration precautions    GI:  - CT abdomen/pelvis some periportal edema, otherwise normal  - Protonix for GI ppx  - Trending LFTs, mildly elevated AST/ALT, possibly due to hypoperfusion of liver during arrest, continue to monitor-currently downtrending  - abdominal US unremarkable  - hepatitis A, B, and C negative  - Diet, Dash, passed bedside dysphagia screening    Renal:  - Daily Labs  - Strict I/Os, urine output appropriate  - East removed, appropriate urine output  - Replete lytes as needed    ID:  #Aspiration PNA  Afebrile. trend fever curve  WBC peaked 19.41, now 9, possible aspiration PNA   - Procal 0.71  - CXR reviewed  - Blood cultures negative after 24 hours  - Continue empiric treatment with Zosyn, started 8/29, END 9/4  - UA noninfectious   - Follow up Sputum culture  - RVP negative     Heme:  DVT ppx SQ heparin    Endo:  - Serum HCG negative  - Followup Angiotension Converting Enzyme (Sarcoidosis),     TIFFANY, DsDNA ab, EBV, Tickborne panel negative for work up as possible causes of sudden cardiac arrest/ heartblock      Code Status: Full Code  Dispo: Requires MICU level of care at this time    Case discussed with MICU Attending: Dr. Bain.   NANY RODRIGES is a 23y female avid runner with PMHx multiple prior syncopal/near syncopal episodes with unremarkable cardio work up presents after witnessed out of hospital cardiac arrest. Found to have aspiration PNA. Now, off pressors and inotropic therapy, still with reduced ejection fraction on US. Completing course of Zosyn for PNA. Pending cardiac MR, CT coronary, and ICD placement.      PLAN:  #Cardiac Arrest  #Ventricular Arrythmia   #Anoxic Brain Injury  #Hypotension  #Bradycardia  #Myocardial Stunning  #Aspiration PNA      Neuro:  #Anoxic Brain Injury  - CT Head reviewed no acute intracranial pathology  - Initially received 24 of cooling to 86 degrees   - Continuous video EEG 24 hours, no seizure activity yet  - Daily neuro checks    CVS:  #Cardiogenic Shock  #Left Ventricular Heart Failure with reduced EF  #Myocardial Stunning after Cardiac Arrest  - 1.5L fluid restriction  - Repeat TTE 8/31 shows Left ventricular systolic function is moderately to severely decreased with an ejection fraction visually estimated at 25 to 30 % / trace pericardial effusion  - Bedside POCUS 9/2 grossly reduced EF, cardiac apex hypokinetic  - Weaned off Levophed 9/1 AM, Weaned off dobutamine 9/2 AM  - Continue high dose steroids as treatment for autoimmune conditions, myocarditis  - Cardiac monitor  - Daily EKG, Right axis deviation, RBBB  - CTA on admission, no PE  - Follows Dr. Soliman at [facility]  - ICD placement before discharge, pending CTA cornoary and Cardiac MRI  Cardiology following  EP team following    Resp:  - s/p extubation 8/30  - Saturating >95% on RA  - CXR: Left lung infiltrates, possible aspiration during resuscitation   - CT Chest possible aspiration PNA  - continue to monitor on   - Aspiration precautions    GI:  - CT abdomen/pelvis some periportal edema, otherwise normal  - Protonix for GI ppx  -  LFTs downtrending, mildly elevated AST/ALT, possibly due to hypoperfusion of liver during arrest, continue to monitor-currently downtrending  - abdominal US unremarkable  - hepatitis A, B, and C negative  - Diet, Dash, passed bedside dysphagia screening    Renal:  - Daily Labs  - Strict I/Os, urine output appropriate  - East removed, appropriate urine output  - Replete lytes as needed    ID:  #Aspiration PNA  Afebrile. trend fever curve  WBC downtrending  - Procal 0.71  - CXRs reviewed  - Blood cultures negative after 24 hours  - Continue empiric treatment with Zosyn, started 8/29, END 9/4  - UA noninfectious   - Follow up Sputum culture  - RVP negative     Heme:  DVT ppx SQ heparin    Endo:  - Serum HCG negative  - Followup Angiotension Converting Enzyme (Sarcoidosis),     TIFFANY, DsDNA ab, EBV, Tickborne panel negative for work up as possible causes of sudden cardiac arrest/ heartblock      Code Status: Full Code  Dispo: Requires MICU level of care at this time    Case discussed with MICU Attending: Dr. Bain.

## 2024-09-02 NOTE — PROGRESS NOTE ADULT - SUBJECTIVE AND OBJECTIVE BOX
[facility]                                   [address] [place] [address]                                                      PHONE: [phone]                                                         FAX: [phone]  [email or web address]  ---------------------------------------------------------------------------------------------------------------------------------    Overnight events/patient complaints:      Feeling well today in NAD off pressors     Allergy Status Unknown    MEDICATIONS  (STANDING):  chlorhexidine 2% Cloths 1 Application(s) Topical daily  heparin   Injectable 5000 Unit(s) SubCutaneous every 12 hours  methylPREDNISolone sodium succinate Injectable 40 milliGRAM(s) IV Push every 12 hours  pantoprazole  Injectable 40 milliGRAM(s) IV Push daily  piperacillin/tazobactam IVPB.. 3.375 Gram(s) IV Intermittent every 8 hours  polyethylene glycol 3350 17 Gram(s) Oral daily  senna 2 Tablet(s) Oral at bedtime    MEDICATIONS  (PRN):  aluminum hydroxide/magnesium hydroxide/simethicone Suspension 30 milliLiter(s) Oral every 4 hours PRN Dyspepsia  metoclopramide Injectable 10 milliGRAM(s) IV Push once PRN Nausea  ondansetron Injectable 4 milliGRAM(s) IV Push every 6 hours PRN Nausea and/or Vomiting  sodium chloride 0.9% lock flush 10 milliLiter(s) IV Push every 1 hour PRN Pre/post blood products, medications, blood draw, and to maintain line patency      Vital Signs Last 24 Hrs  T(C): 36.3 (02 Sep 2024 07:34), Max: 37 (01 Sep 2024 11:00)  T(F): 97.3 (02 Sep 2024 07:34), Max: 98.6 (01 Sep 2024 11:00)  HR: 61 (02 Sep 2024 09:00) (48 - 118)  BP: 98/64 (02 Sep 2024 09:00) (77/44 - 116/75)  BP(mean): 74 (02 Sep 2024 09:00) (53 - 88)  RR: 21 (02 Sep 2024 09:00) (12 - 24)  SpO2: 100% (02 Sep 2024 09:00) (95% - 100%)    Parameters below as of 02 Sep 2024 08:00  Patient On (Oxygen Delivery Method): room air      ICU Vital Signs Last 24 Hrs  NANY RODRIGES  I&O's Detail    01 Sep 2024 07:01  -  02 Sep 2024 07:00  --------------------------------------------------------  IN:    DOBUTamine: 76.8 mL    IV PiggyBack: 300 mL    Norepinephrine: 45.6 mL    Oral Fluid: 1250 mL  Total IN: 1672.4 mL    OUT:    Voided (mL): 1400 mL  Total OUT: 1400 mL    Total NET: 272.4 mL      02 Sep 2024 07:01  -  02 Sep 2024 09:33  --------------------------------------------------------  IN:    IV PiggyBack: 50 mL    Oral Fluid: 100 mL  Total IN: 150 mL    OUT:  Total OUT: 0 mL    Total NET: 150 mL        I&O's Summary    01 Sep 2024 07:01  -  02 Sep 2024 07:00  --------------------------------------------------------  IN: 1672.4 mL / OUT: 1400 mL / NET: 272.4 mL    02 Sep 2024 07:01  -  02 Sep 2024 09:33  --------------------------------------------------------  IN: 150 mL / OUT: 0 mL / NET: 150 mL      Drug Dosing Weight  NANY RODRIGES      PHYSICAL EXAM:  General: Appears well developed, alert and cooperative.  HEENT: Head; normocephalic, atraumatic.  Eyes: Pupils reactive, cornea wnl.  Neck: Supple, no nodes adenopathy, no NVD or carotid bruit or thyromegaly.  CARDIOVASCULAR: Normal S1 and S2, No murmur, rub, gallop or lift.   LUNGS: No rales, rhonchi or wheeze. Normal breath sounds bilaterally.  ABDOMEN: Soft, nontender without mass or organomegaly. bowel sounds normoactive.  EXTREMITIES: No clubbing, cyanosis or edema. Distal pulses wnl.   SKIN: warm and dry with normal turgor.  NEURO: Alert/oriented x 3/normal motor exam. No pathologic reflexes.    PSYCH: normal affect.        LABS:                        11.9   9.41  )-----------( 195      ( 02 Sep 2024 04:05 )             35.4     09-02    137  |  103  |  11.3  ----------------------------<  123<H>  3.8   |  24.0  |  0.41<L>    Ca    8.6      02 Sep 2024 04:05  Phos  2.8     09-02  Mg     2.0     09-02    TPro  6.0<L>  /  Alb  3.4  /  TBili  0.6  /  DBili  x   /  AST  63<H>  /  ALT  48<H>  /  AlkPhos  55  09-02    NANY RODRIGES      PT/INR - ( 02 Sep 2024 04:05 )   PT: 12.3 sec;   INR: 1.11 ratio           Urinalysis Basic - ( 02 Sep 2024 04:05 )    Color: x / Appearance: x / SG: x / pH: x  Gluc: 123 mg/dL / Ketone: x  / Bili: x / Urobili: x   Blood: x / Protein: x / Nitrite: x   Leuk Esterase: x / RBC: x / WBC x   Sq Epi: x / Non Sq Epi: x / Bacteria: x        RADIOLOGY & ADDITIONAL STUDIES:    INTERPRETATION OF TELEMETRY (personally reviewed):    < from: TTE W or WO Ultrasound Enhancing Agent (08.31.24 @ 10:10) >  CONCLUSIONS:      1. Left ventricular systolic function is moderately to severely decreased with an ejection fraction visually estimated at 25 to 30 %.   2. Multiple segmental abnormalities exist. See findings.   3. Normal right ventricular cavity size and normal right ventricular systolic function.   4. Trace pericardial effusion noted adjacent to the posterior left ventricle.   5. Compared to the transthoracic echocardiogram performed on 8/29/2024, there is slight worsening of the LVEF.    < end of copied text >      23y Female with prior h/o near syncope and 1 episode of syncope s/p cardiac arrest while running. Patient was found PEA, 3 rounds of CPR, 1 epi in the field, found to be in VTach and defibrillated. Upon arrival to ED, sinus bradycardia in 30s, atropine was given with improvement in HR, patient intubated for airway protection. ECG in ED with junctional rhythm with retrograde p wave.    Off pressor support doing well hemodynamic     + trop and LV dysfunction likely stress induce     Cardiac arrest  - ICU monitoring with supportive care  - Coronary CTA to r/o anomalous coronary arteries likely on Tues   - Cardiac MRI to r/o ARVD to follow Coronary CTA   - Eventual ICD for secondary prevention

## 2024-09-02 NOTE — PROGRESS NOTE ADULT - ATTENDING COMMENTS
I have personally seen and examined the patient. I fully participated in the care of this patient. I have made amendments to the documentation where necessary, and agree with the history, physical exam, and plan as documented by the Resident.    23 F w/ h/o recurrent syncopal episodes during strenuous exercise (had cardiac workup including echo, Holter monitor that was largely unrevealing per documentation), presented to the hospital with witnessed cardiac arrest while running up until 8 miles. She had to sit down on a bench and then lost consciousness. Her boyfriend who was with her did not provide CPR as he was not sure if she had a pulse, EMS arrived in 5 minutes and was found to be in PEA arrest. ACLS protocol started and also found to have V.tach and shocked, ACLS time of ~10 minutes. She was intubated, treated for IAN aspiration pneumonia, started on inotropes and vasopressors and subsequently extubated and titrated off inotropic and vasopressor support.     She is nearly back to her baseline mental status but has some memory deficits that her family is concerned about (family requesting neuro consultation). TTE on admission showed severely reduced LV systolic function which remains persistent on POCUS today off inotropes. She has a small pericardial effusion (read as trace on prior TTE) which does not appear to have hemodynamic significance. She has no chest pain to suggest pericarditis. No EKG findings concerning for pericarditis either. Will order repeat limited TTE for tomorrow morning to monitor. Discussed w/ cardiologist Dr. Fisher at bedside, will perform coronary CTA to evaluate coronaries for ischemic heart disease and also to evaluate for anatomical anomalies such as anomalous left coronary artery from pulmonary trunk. He recommends to do this test tomorrow. If negative for abnormalities, will require cardiac MRI for closer evaluation of the right ventricle. Autoimmune labs pending. Possible cardiac sarcoidosis, no evidence of pulmonary sarcoidosis on imaging. Monitor in MICU.
22 y/o F with no known past medical history, has been having syncope with unremarkable cardiac workup as outpatient, active runner, became unresponsive when running on 8/29, witnessed by boyfriend with cardiac arrest.  Noted to be PEA initially, then pulse vtach later s/p 1x shock.  Later intubated in the ED, and  admit to MICU for post cardiac arrest management.  Patient found to have EF of 30-35%, started dobutamine along with levo for cardiogenic shock.  No ischemic workup given poor mental status in the beginning, however patient later has improved mental status and successfully extubated on 8/30.    Currently remain on levo and dobutamine, mental status AAOx2 but follow commands.   repeat limited echo show slightly worsening EF  attempt total of 500 ml of IVF bolus today with some improvement of levophed weaning  procal elevated, contijnue antibiotics empirically, blood cultures were negatived, to treat for possible aspiration pneumonia    Per discussion with cardio, unlikely ischemic event, but concern about abnormal coronaries/stunning post arrest/defib.  NO acute need for cath, and no acute need for mechanical support.   Will need eventual cardiac MRI,  CTA coroaronies     WIll try gentle fluid to help with the shock state  CRP very elevated at 100s, will empirically start steroid to cover for possible myocarditis? vs autoimmune process  Autoimmune lab sent  abd ultrasound unremarkable, qtc improves, continue PRN zofran/reglan   if symptoms persistent, consider repeat ct scan .     extensive updated family at bedside  prognosis guarded at this time
23F PMH multiple prior syncopal episodes during strenuous exercise who presented s/p witnessed syncope and cardiac arrest, 3 rounds of CPR given, initially PEA and then noted with VT and was defibrillated. In the ED was sinus bradycardic to 30s and was given Atropine, and intubated. On CT noted with L-sided infiltrates, started on Zosyn., will continue. Placed on TTM with target 36C. Noted with multiple areas of hypokinesis on TTE, EF 30-35%. Started on Levophed + Dobutamine. Will increase dose of Dobutamine to 5, monitor UOP which has been decreasing. Will renally dose all medications. Serial mental status checks, SAT/SBT. Will need eventual LHC to r/o anomalous coronary artery anatomy, and cardiac MR. Cardiology is following. DVT PPx. Fentanyl for sedation, will wean Propofol. DVT PPx.       Eddie Tipton MD, [facility]  , Pulmonary & Critical Care Medicine  [facility]  Pulmonary and [facility] Medicine at [place]  [address]  [place] [address]  T: [phone]  F: [phone]
24 y/o F with no known past medical history, has been having syncope with unremarkable cardiac workup as outpatient, active runner, became unresponsive when running on 8/29, witnessed by boyfriend with cardiac arrest.  Noted to be PEA initially, then pulse vtach later s/p 1x shock.  Later intubated in the ED, and  admit to MICU for post cardiac arrest management.  Patient found to have EF of 30-35%, started dobutamine along with levo for cardiogenic shock.  No ischemic workup given poor mental status in the beginning, however patient later has improved mental status and successfully extubated on 8/30.    Currently remain on levo and dobutamine, mental status AAOx2 but follow commands.   repeat limited echo show slightly worsening EF  attempt total of 500 ml of IVF bolus today with some improvement of levophed weaning  procal elevated, continue antibiotics empirically, blood cultures were negatived, to treat for possible aspiration pneumonia    Per discussion with cardio, unlikely ischemic event, but concern about abnormal coronaries/stunning post arrest/defib.  NO acute need for cath, and no acute need for mechanical support.   Will need eventual cardiac MRI,  CTA coroaronies     9/1 - levophed weaned off, continue on dobutamine 2.5 mcg  Patient is AAOx2-3, still reporting 2021 at times  CRP improved with steroid, will continue solumedrol 40 mg q12h for stress dose / autoimmune process  bedside pocus - IVC 2.3 cm with minimal respiratory changes, no further IV fluid    pending possible left heart cath, possible CTA heart and cardiac MRI   tentative 5-7 days empiric antibiotic course   family updated  prognosis guarded

## 2024-09-02 NOTE — PROGRESS NOTE ADULT - SUBJECTIVE AND OBJECTIVE BOX
Patient is a 23y old  Female who presents with a chief complaint of Cardiac Arrest (02 Sep 2024 09:33)      BRIEF HOSPITAL COURSE: 23 year old PMHx multiple syncopal episodes during strenuous exercise presents with witnessed syncopal episode/cardiac arrest. Patient was running with her boyfriend, on mile 8 patient sat down on a bench and syncopated. Patients boyfriend states patient was unresponsive, tried elevating legs with no improvement, states unsure if patient had a pulse. Called EMS, who arrived to the scene 5 mins, boyfriend did not provide CPR while awaiting EMS. Patient was found PEA, 3 rounds of CPR, 1 epi in the field, found to be in VTach and defibrillated. Upon arrival to ED, sinus bradycardia in 30s, atropine was given with improvement in HR, patient intubated for airway protection. CXR shows left sided infiltrates. CT chest shows possible aspiration PNA. Started on Zosyn 8/29. Continuous EEG without epileptiform activity. TTE completed yesterday EF of 30-35%, dyskinesis of apex of heart. Complete 24 hours total of continuous EEG. Trial off sedition yesterday, patient following commands, making purposeful movements. Slowly titrated from 0.2 LEVO and 5 ofdobutamine for pressure and inotropic support. Now, no longer requiring pressors/ inotropes. Bedside Cardiac US shows grossly reduced EF consistent with previous ECHOs. East removed. Central Line and Arterial line removed.    Interval HPI: Today, AxOx2-3. States the year is 2022. Sometimes states the year is 2024. Patient ambulating to the bathroom under supervision. Complains of dizziness and nausea. Denies chest pain while sitting and when ambulating to the bathroom. Deneis shortness of breath    PAST MEDICAL & SURGICAL HISTORY:  Syncopal episodes          Review of Systems:  CONSTITUTIONAL: No fever, chills, or fatigue  EYES: No eye pain, visual disturbances, or discharge  ENMT:  No difficulty hearing, tinnitus, vertigo; No sinus or throat pain  RESPIRATORY: +cough, no wheezing, chills or hemoptysis; No shortness of breath  CARDIOVASCULAR: No chest pain, palpitations, dizziness, or leg swelling  GASTROINTESTINAL: No abdominal or epigastric pain. No nausea, vomiting, or hematemesis; No diarrhea or constipation.  GENITOURINARY: No dysuria, frequency, hematuria, or incontinence  NEUROLOGICAL: No headaches, memory loss, loss of strength, or numbness  SKIN: No rash  MUSCULOSKELETAL: No joint pain or swelling; No muscle, back, or extremity pain  PSYCHIATRIC: No depression, anxiety, mood swings, or difficulty sleeping      Medications:  piperacillin/tazobactam IVPB.. 3.375 Gram(s) IV Intermittent every 8 hours        metoclopramide Injectable 10 milliGRAM(s) IV Push once PRN  ondansetron Injectable 4 milliGRAM(s) IV Push every 6 hours PRN      heparin   Injectable 5000 Unit(s) SubCutaneous every 12 hours    aluminum hydroxide/magnesium hydroxide/simethicone Suspension 30 milliLiter(s) Oral every 4 hours PRN  pantoprazole  Injectable 40 milliGRAM(s) IV Push daily  polyethylene glycol 3350 17 Gram(s) Oral daily  senna 2 Tablet(s) Oral at bedtime      methylPREDNISolone sodium succinate Injectable 40 milliGRAM(s) IV Push every 12 hours    sodium chloride 0.9% lock flush 10 milliLiter(s) IV Push every 1 hour PRN      chlorhexidine 2% Cloths 1 Application(s) Topical daily            ICU Vital Signs Last 24 Hrs  T(C): 36.3 (02 Sep 2024 07:34), Max: 36.8 (01 Sep 2024 16:15)  T(F): 97.3 (02 Sep 2024 07:34), Max: 98.2 (01 Sep 2024 16:15)  HR: 81 (02 Sep 2024 11:00) (48 - 118)  BP: 105/63 (02 Sep 2024 11:00) (77/44 - 116/75)  BP(mean): 78 (02 Sep 2024 11:00) (53 - 88)  ABP: 98/63 (02 Sep 2024 11:00) (66/42 - 107/70)  ABP(mean): 75 (02 Sep 2024 11:00) (51 - 82)  RR: 19 (02 Sep 2024 11:00) (12 - 26)  SpO2: 100% (02 Sep 2024 11:00) (95% - 100%)    O2 Parameters below as of 02 Sep 2024 12:00  Patient On (Oxygen Delivery Method): room air            ABG - ( 02 Sep 2024 03:57 )  pH, Arterial: 7.440 pH, Blood: x     /  pCO2: 39    /  pO2: 104   / HCO3: 26    / Base Excess: 2.3   /  SaO2: 99.6                I&O's Detail    01 Sep 2024 07:01  -  02 Sep 2024 07:00  --------------------------------------------------------  IN:    DOBUTamine: 76.8 mL    IV PiggyBack: 300 mL    Norepinephrine: 45.6 mL    Oral Fluid: 1250 mL  Total IN: 1672.4 mL    OUT:    Voided (mL): 1400 mL  Total OUT: 1400 mL    Total NET: 272.4 mL      02 Sep 2024 07:01  -  02 Sep 2024 11:42  --------------------------------------------------------  IN:    IV PiggyBack: 50 mL    Oral Fluid: 100 mL  Total IN: 150 mL    OUT:  Total OUT: 0 mL    Total NET: 150 mL            LABS:                        11.9   9.41  )-----------( 195      ( 02 Sep 2024 04:05 )             35.4     09-02    137  |  103  |  11.3  ----------------------------<  123<H>  3.8   |  24.0  |  0.41<L>    Ca    8.6      02 Sep 2024 04:05  Phos  2.8     09-02  Mg     2.0     09-02    TPro  6.0<L>  /  Alb  3.4  /  TBili  0.6  /  DBili  x   /  AST  63<H>  /  ALT  48<H>  /  AlkPhos  55  09-02          CAPILLARY BLOOD GLUCOSE        PT/INR - ( 02 Sep 2024 04:05 )   PT: 12.3 sec;   INR: 1.11 ratio           Urinalysis Basic - ( 02 Sep 2024 04:05 )    Color: x / Appearance: x / SG: x / pH: x  Gluc: 123 mg/dL / Ketone: x  / Bili: x / Urobili: x   Blood: x / Protein: x / Nitrite: x   Leuk Esterase: x / RBC: x / WBC x   Sq Epi: x / Non Sq Epi: x / Bacteria: x      CULTURES:  Rapid RVP Result: NotDetec (08-31 @ 07:33)  Culture Results:   No growth at 72 Hours (08-29 @ 17:48)  Culture Results:   No growth at 72 Hours (08-29 @ 16:00)      Physical Examination:    General: No acute distress.    HEENT: Pupils equal, reactive to light.  Symmetric.  PULM: Clear to auscultation bilaterally, no significant sputum production  NECK: Supple, no lymphadenopathy, trachea midline  CVS: Regular rate and rhythm, no murmurs, rubs, or gallops  ABD: Soft, nondistended, nontender, normoactive bowel sounds, no masses  EXT: No edema, nontender  SKIN: Warm and well perfused, no rashes noted.  NEURO: Alert, oriented, interactive, nonfocal      DEVICES:     RADIOLOGY: ***    CRITICAL CARE TIME SPENT: ***   Patient is a 23y old  Female who presents with a chief complaint of Cardiac Arrest (02 Sep 2024 09:33)      BRIEF HOSPITAL COURSE: 23 year old PMHx multiple syncopal episodes during strenuous exercise presents with witnessed syncopal episode/cardiac arrest. Patient was running with her boyfriend, on mile 8 patient sat down on a bench and syncopated. Patients boyfriend states patient was unresponsive, tried elevating legs with no improvement, states unsure if patient had a pulse. Called EMS, who arrived to the scene 5 mins, boyfriend did not provide CPR while awaiting EMS. Patient was found PEA, 3 rounds of CPR, 1 epi in the field, found to be in VTach and defibrillated. Upon arrival to ED, sinus bradycardia in 30s, atropine was given with improvement in HR, patient intubated for airway protection. CXR shows left sided infiltrates. CT chest shows possible aspiration PNA. Started on Zosyn 8/29. Continuous EEG without epileptiform activity. TTE completed yesterday EF of 30-35%, dyskinesis of apex of heart. Complete 24 hours total of continuous EEG. Trial off sedition yesterday, patient following commands, making purposeful movements. Slowly titrated from 0.2 LEVO and 5 of dobutamine for pressure and inotropic support. Now, no longer requiring pressors/ inotropics. Bedside Cardiac US shows grossly reduced EF consistent with previous ECHOs. East removed. Central Line and Arterial line removed on 9/2.    Interval HPI: Today, AxOx2-3. States the year is 2022. Sometimes states the year is 2024. Patient ambulating to the bathroom under supervision. Complains of dizziness and nausea. Denies chest pain while sitting and when ambulating to the bathroom. Deneis shortness of breath    PAST MEDICAL & SURGICAL HISTORY:  Syncopal episodes          Review of Systems:  CONSTITUTIONAL: No fever, chills, or fatigue  EYES: No eye pain, visual disturbances, or discharge  ENMT:  No difficulty hearing, tinnitus, vertigo; No sinus or throat pain  RESPIRATORY: +cough, no wheezing, chills or hemoptysis; No shortness of breath  CARDIOVASCULAR: No chest pain, palpitations, dizziness, or leg swelling  GASTROINTESTINAL: No abdominal or epigastric pain. No nausea, vomiting, or hematemesis; No diarrhea or constipation.  GENITOURINARY: No dysuria, frequency, hematuria, or incontinence  NEUROLOGICAL: No headaches, memory loss, loss of strength, or numbness  SKIN: No rash  MUSCULOSKELETAL: No joint pain or swelling; No muscle, back, or extremity pain  PSYCHIATRIC: No depression, anxiety, mood swings, or difficulty sleeping      Medications:  piperacillin/tazobactam IVPB.. 3.375 Gram(s) IV Intermittent every 8 hours        metoclopramide Injectable 10 milliGRAM(s) IV Push once PRN  ondansetron Injectable 4 milliGRAM(s) IV Push every 6 hours PRN      heparin   Injectable 5000 Unit(s) SubCutaneous every 12 hours    aluminum hydroxide/magnesium hydroxide/simethicone Suspension 30 milliLiter(s) Oral every 4 hours PRN  pantoprazole  Injectable 40 milliGRAM(s) IV Push daily  polyethylene glycol 3350 17 Gram(s) Oral daily  senna 2 Tablet(s) Oral at bedtime      methylPREDNISolone sodium succinate Injectable 40 milliGRAM(s) IV Push every 12 hours    sodium chloride 0.9% lock flush 10 milliLiter(s) IV Push every 1 hour PRN      chlorhexidine 2% Cloths 1 Application(s) Topical daily            ICU Vital Signs Last 24 Hrs  T(C): 36.3 (02 Sep 2024 07:34), Max: 36.8 (01 Sep 2024 16:15)  T(F): 97.3 (02 Sep 2024 07:34), Max: 98.2 (01 Sep 2024 16:15)  HR: 81 (02 Sep 2024 11:00) (48 - 118)  BP: 105/63 (02 Sep 2024 11:00) (77/44 - 116/75)  BP(mean): 78 (02 Sep 2024 11:00) (53 - 88)  ABP: 98/63 (02 Sep 2024 11:00) (66/42 - 107/70)  ABP(mean): 75 (02 Sep 2024 11:00) (51 - 82)  RR: 19 (02 Sep 2024 11:00) (12 - 26)  SpO2: 100% (02 Sep 2024 11:00) (95% - 100%)    O2 Parameters below as of 02 Sep 2024 12:00  Patient On (Oxygen Delivery Method): room air            ABG - ( 02 Sep 2024 03:57 )  pH, Arterial: 7.440 pH, Blood: x     /  pCO2: 39    /  pO2: 104   / HCO3: 26    / Base Excess: 2.3   /  SaO2: 99.6                I&O's Detail    01 Sep 2024 07:01  -  02 Sep 2024 07:00  --------------------------------------------------------  IN:    DOBUTamine: 76.8 mL    IV PiggyBack: 300 mL    Norepinephrine: 45.6 mL    Oral Fluid: 1250 mL  Total IN: 1672.4 mL    OUT:    Voided (mL): 1400 mL  Total OUT: 1400 mL    Total NET: 272.4 mL      02 Sep 2024 07:01  -  02 Sep 2024 11:42  --------------------------------------------------------  IN:    IV PiggyBack: 50 mL    Oral Fluid: 100 mL  Total IN: 150 mL    OUT:  Total OUT: 0 mL    Total NET: 150 mL            LABS:                        11.9   9.41  )-----------( 195      ( 02 Sep 2024 04:05 )             35.4     09-02    137  |  103  |  11.3  ----------------------------<  123<H>  3.8   |  24.0  |  0.41<L>    Ca    8.6      02 Sep 2024 04:05  Phos  2.8     09-02  Mg     2.0     09-02    TPro  6.0<L>  /  Alb  3.4  /  TBili  0.6  /  DBili  x   /  AST  63<H>  /  ALT  48<H>  /  AlkPhos  55  09-02          CAPILLARY BLOOD GLUCOSE        PT/INR - ( 02 Sep 2024 04:05 )   PT: 12.3 sec;   INR: 1.11 ratio           Urinalysis Basic - ( 02 Sep 2024 04:05 )    Color: x / Appearance: x / SG: x / pH: x  Gluc: 123 mg/dL / Ketone: x  / Bili: x / Urobili: x   Blood: x / Protein: x / Nitrite: x   Leuk Esterase: x / RBC: x / WBC x   Sq Epi: x / Non Sq Epi: x / Bacteria: x      CULTURES:  Rapid RVP Result: NotDetec (08-31 @ 07:33)  Culture Results:   No growth at 72 Hours (08-29 @ 17:48)  Culture Results:   No growth at 72 Hours (08-29 @ 16:00)      Physical Examination:    General: No acute distress.    HEENT: Pupils equal, reactive to light.  Symmetric.  PULM: + crackles, no significant sputum production  NECK: Supple, no lymphadenopathy, trachea midline  CVS: Regular rate and rhythm, no murmurs, rubs, or gallops  ABD: Soft, nondistended, nontender, normoactive bowel sounds, no masses  EXT: No edema, nontender  SKIN: Warm and well perfused, no rashes noted.  NEURO: Alert, oriented, interactive, nonfocal      DEVICES:     RADIOLOGY: ***    CRITICAL CARE TIME SPENT: ***

## 2024-09-03 ENCOUNTER — RESULT REVIEW (OUTPATIENT)
Age: 24
End: 2024-09-03

## 2024-09-03 LAB
ACE SERPL-CCNC: 72 U/L — SIGNIFICANT CHANGE UP (ref 14–82)
ALBUMIN SERPL ELPH-MCNC: 3.4 G/DL — SIGNIFICANT CHANGE UP (ref 3.3–5.2)
ALP SERPL-CCNC: 55 U/L — SIGNIFICANT CHANGE UP (ref 40–120)
ALT FLD-CCNC: 46 U/L — HIGH
ANA TITR SER: NEGATIVE — SIGNIFICANT CHANGE UP
ANION GAP SERPL CALC-SCNC: 10 MMOL/L — SIGNIFICANT CHANGE UP (ref 5–17)
AST SERPL-CCNC: 59 U/L — HIGH
BILIRUB SERPL-MCNC: 0.4 MG/DL — SIGNIFICANT CHANGE UP (ref 0.4–2)
BUN SERPL-MCNC: 16.4 MG/DL — SIGNIFICANT CHANGE UP (ref 8–20)
CALCIUM SERPL-MCNC: 8.6 MG/DL — SIGNIFICANT CHANGE UP (ref 8.4–10.5)
CHLORIDE SERPL-SCNC: 103 MMOL/L — SIGNIFICANT CHANGE UP (ref 96–108)
CO2 SERPL-SCNC: 25 MMOL/L — SIGNIFICANT CHANGE UP (ref 22–29)
CREAT SERPL-MCNC: 0.45 MG/DL — LOW (ref 0.5–1.3)
EGFR: 139 ML/MIN/1.73M2 — SIGNIFICANT CHANGE UP
GLUCOSE SERPL-MCNC: 107 MG/DL — HIGH (ref 70–99)
HCT VFR BLD CALC: 36.7 % — SIGNIFICANT CHANGE UP (ref 34.5–45)
HGB BLD-MCNC: 12.1 G/DL — SIGNIFICANT CHANGE UP (ref 11.5–15.5)
MAGNESIUM SERPL-MCNC: 2 MG/DL — SIGNIFICANT CHANGE UP (ref 1.6–2.6)
MCHC RBC-ENTMCNC: 31.3 PG — SIGNIFICANT CHANGE UP (ref 27–34)
MCHC RBC-ENTMCNC: 33 GM/DL — SIGNIFICANT CHANGE UP (ref 32–36)
MCV RBC AUTO: 95.1 FL — SIGNIFICANT CHANGE UP (ref 80–100)
PHOSPHATE SERPL-MCNC: 3.5 MG/DL — SIGNIFICANT CHANGE UP (ref 2.4–4.7)
PLATELET # BLD AUTO: 216 K/UL — SIGNIFICANT CHANGE UP (ref 150–400)
POTASSIUM SERPL-MCNC: 4.9 MMOL/L — SIGNIFICANT CHANGE UP (ref 3.5–5.3)
POTASSIUM SERPL-SCNC: 4.9 MMOL/L — SIGNIFICANT CHANGE UP (ref 3.5–5.3)
PROT SERPL-MCNC: 6.3 G/DL — LOW (ref 6.6–8.7)
RBC # BLD: 3.86 M/UL — SIGNIFICANT CHANGE UP (ref 3.8–5.2)
RBC # FLD: 13 % — SIGNIFICANT CHANGE UP (ref 10.3–14.5)
SODIUM SERPL-SCNC: 138 MMOL/L — SIGNIFICANT CHANGE UP (ref 135–145)
WBC # BLD: 9.25 K/UL — SIGNIFICANT CHANGE UP (ref 3.8–10.5)
WBC # FLD AUTO: 9.25 K/UL — SIGNIFICANT CHANGE UP (ref 3.8–10.5)

## 2024-09-03 PROCEDURE — 99223 1ST HOSP IP/OBS HIGH 75: CPT

## 2024-09-03 PROCEDURE — 93306 TTE W/DOPPLER COMPLETE: CPT | Mod: 26

## 2024-09-03 PROCEDURE — 75574 CT ANGIO HRT W/3D IMAGE: CPT | Mod: 26

## 2024-09-03 PROCEDURE — 93010 ELECTROCARDIOGRAM REPORT: CPT

## 2024-09-03 PROCEDURE — 99233 SBSQ HOSP IP/OBS HIGH 50: CPT

## 2024-09-03 RX ADMIN — Medication 5000 UNIT(S): at 05:17

## 2024-09-03 RX ADMIN — PIPERACILLIN SODIUM AND TAZOBACTAM SODIUM 25 GRAM(S): 3; .375 INJECTION, POWDER, FOR SOLUTION INTRAVENOUS at 05:17

## 2024-09-03 RX ADMIN — Medication 5000 UNIT(S): at 17:38

## 2024-09-03 RX ADMIN — Medication 40 MILLIGRAM(S): at 05:17

## 2024-09-03 RX ADMIN — CHLORHEXIDINE GLUCONATE 1 APPLICATION(S): 40 SOLUTION TOPICAL at 17:40

## 2024-09-03 NOTE — PROGRESS NOTE ADULT - ASSESSMENT
22 y/o F with a h/o  multiple syncopal episodes during strenuous exercise with previously negative cardiac workup, with:    # PEA/VT cardiac arrest  # Cardiogenic shock  # Acute systolic heart failure  # Bradycardia  # Acute hypoxemic respiratory failure  # Aspiration pneumonia    - will send for coronary CTA today to r/o anomalous coronary arteries  - plan for cardiac MRI to r/o ARVD  - will likely require ICD prior to discharge  - cardiology input appreciated  - weaned off IV vasopressor and inotrope support  - monitor hemodynamics closely, maintain a MAP > 65 and HR > 30  - tolerating room air, maintain SpO2 > 92%  - continue empiric Zosyn for now    Case discussed with MICU physician, Dr. Calvin.    40 minutes accounts for the total time spent on this patient encounter, which includes assessing and addressing the problems and their associated risks as mentioned above, reviewing the medical record/laboratory data/imaging studies, interviewing and physically examining the patient, as well as coordinating care with the multidisciplinary team. The date of entry of this note reflects the date of services rendered.

## 2024-09-03 NOTE — PROGRESS NOTE ADULT - SUBJECTIVE AND OBJECTIVE BOX
Patient is a 23y old  Female who presents with a chief complaint of Cardiac Arrest (02 Sep 2024 11:42)      BRIEF HOSPITAL COURSE: 22 y/o F with a h/o  multiple syncopal episodes during strenuous exercise with previously negative cardiac workup, admitted on 8/29 following witnessed syncopal episode followed by cardiac arrest while running. Patient was running with her boyfriend, and on mile 8 patient sat down on a bench and passed out. Patients boyfriend states patient was unresponsive, initially breathing spontaneously and with a pulse. Called EMS, who arrived to the scene after 7 minutes, states she lost a pulse probably 2 minutes prior to EMS arrival but bystander CPR was not initiated. Patient was found PEA, reportedly received 3 rounds of CPR, 1 epi in the field, found to be in VT and defibrillated. ROSC was achieved in the field. Upon arrival to ED, patient was unresponsive GCS 3 and agonally breathing. She was intubated in ED for airway protection. Vital signs revealed sinus bradycardia in 30s, atropine was given with improvement in HR. Initial labs revealed mild CASSIUS and transaminitis, normal lactate, normal initial troponin which uptrended to 2330 on repeat with CK 1040. No significant electrolyte derangements. Hospital course complicated by cardiogenic shock and LVEF 30-35%. Extubated on 8/30.        Events last 24 hours: No recurrent VT. Persistent sinus bradycardia (HR as low as 30s when asleep). BP stable off vasopressors.        PAST MEDICAL & SURGICAL HISTORY:  Syncopal episodes          Review of Systems:  CONSTITUTIONAL: No fever, chills, or fatigue  EYES: No eye pain, visual disturbances, or discharge  ENMT:  No difficulty hearing, tinnitus, vertigo; No sinus or throat pain  NECK: No pain or stiffness  RESPIRATORY: No cough, wheezing, chills or hemoptysis; No shortness of breath  CARDIOVASCULAR: No chest pain, palpitations, dizziness, or leg swelling  GASTROINTESTINAL: No abdominal or epigastric pain. No nausea, vomiting, or hematemesis; No diarrhea or constipation. No melena or hematochezia.  GENITOURINARY: No dysuria, frequency, hematuria, or incontinence  NEUROLOGICAL: No headaches, memory loss, loss of strength, numbness, or tremors  SKIN: No itching, burning, rashes, or lesions   MUSCULOSKELETAL: No joint pain or swelling; No muscle, back, or extremity pain  PSYCHIATRIC: No depression, anxiety, mood swings, or difficulty sleeping      Medications:  piperacillin/tazobactam IVPB.. 3.375 Gram(s) IV Intermittent every 8 hours        metoclopramide Injectable 10 milliGRAM(s) IV Push once PRN  ondansetron Injectable 4 milliGRAM(s) IV Push every 6 hours PRN      heparin   Injectable 5000 Unit(s) SubCutaneous every 12 hours    aluminum hydroxide/magnesium hydroxide/simethicone Suspension 30 milliLiter(s) Oral every 4 hours PRN  pantoprazole  Injectable 40 milliGRAM(s) IV Push daily  polyethylene glycol 3350 17 Gram(s) Oral daily  senna 2 Tablet(s) Oral at bedtime      methylPREDNISolone sodium succinate Injectable 40 milliGRAM(s) IV Push every 12 hours    sodium chloride 0.9% lock flush 10 milliLiter(s) IV Push every 1 hour PRN      chlorhexidine 2% Cloths 1 Application(s) Topical daily            ICU Vital Signs Last 24 Hrs  T(C): 36.7 (03 Sep 2024 03:30), Max: 36.9 (02 Sep 2024 23:34)  T(F): 98 (03 Sep 2024 03:30), Max: 98.4 (02 Sep 2024 23:34)  HR: 43 (03 Sep 2024 05:30) (43 - 91)  BP: 95/67 (03 Sep 2024 05:30) (87/53 - 108/71)  BP(mean): 76 (03 Sep 2024 05:30) (65 - 83)  ABP: 87/51 (02 Sep 2024 14:00) (78/46 - 107/70)  ABP(mean): 63 (02 Sep 2024 14:00) (57 - 82)  RR: 21 (03 Sep 2024 05:30) (9 - 26)  SpO2: 97% (03 Sep 2024 05:30) (96% - 100%)    O2 Parameters below as of 03 Sep 2024 05:30  Patient On (Oxygen Delivery Method): room air            ABG - ( 02 Sep 2024 03:57 )  pH, Arterial: 7.440 pH, Blood: x     /  pCO2: 39    /  pO2: 104   / HCO3: 26    / Base Excess: 2.3   /  SaO2: 99.6                I&O's Detail    01 Sep 2024 07:01  -  02 Sep 2024 07:00  --------------------------------------------------------  IN:    DOBUTamine: 76.8 mL    IV PiggyBack: 300 mL    Norepinephrine: 45.6 mL    Oral Fluid: 1250 mL  Total IN: 1672.4 mL    OUT:    Voided (mL): 1400 mL  Total OUT: 1400 mL    Total NET: 272.4 mL      02 Sep 2024 07:01  -  03 Sep 2024 06:09  --------------------------------------------------------  IN:    IV PiggyBack: 250 mL    Oral Fluid: 1460 mL  Total IN: 1710 mL    OUT:    Voided (mL): 1800 mL  Total OUT: 1800 mL    Total NET: -90 mL            LABS:                        12.1   9.25  )-----------( 216      ( 03 Sep 2024 04:33 )             36.7     09-03    138  |  103  |  16.4  ----------------------------<  107<H>  4.9   |  25.0  |  0.45<L>    Ca    8.6      03 Sep 2024 04:33  Phos  3.5     09-03  Mg     2.0     09-03    TPro  6.3<L>  /  Alb  3.4  /  TBili  0.4  /  DBili  x   /  AST  59<H>  /  ALT  46<H>  /  AlkPhos  55  09-03          CAPILLARY BLOOD GLUCOSE        PT/INR - ( 02 Sep 2024 04:05 )   PT: 12.3 sec;   INR: 1.11 ratio           Urinalysis Basic - ( 03 Sep 2024 04:33 )    Color: x / Appearance: x / SG: x / pH: x  Gluc: 107 mg/dL / Ketone: x  / Bili: x / Urobili: x   Blood: x / Protein: x / Nitrite: x   Leuk Esterase: x / RBC: x / WBC x   Sq Epi: x / Non Sq Epi: x / Bacteria: x      CULTURES:  Rapid RVP Result: NotDetec (08-31-24 @ 07:33)  Culture Results:   No growth at 4 days (08-29-24 @ 17:48)  Culture Results:   No growth at 4 days (08-29-24 @ 16:00)        Physical Examination:    General: No acute distress.  Alert, oriented, interactive, nonfocal    HEENT: Pupils equal, reactive to light.  Symmetric.    PULM: Clear to auscultation bilaterally, no significant sputum production    CVS: bradycardic, reg rhythm, no murmurs, rubs, or gallops    ABD: Soft, nondistended, nontender, normoactive bowel sounds, no masses    EXT: No edema, nontender    SKIN: Warm and well perfused, no rashes noted.    NEURO: A&Ox3, strength 5/5 all extremities, cranial nerves grossly intact, no focal deficits        RADIOLOGY:     < from: US Abdomen Complete (US Abdomen Complete .) (08.31.24 @ 12:10) >  FINDINGS:    Observed partially limited by patient immobility and artifact from   overlying bowel gas.    Liver: Within normal limits.  Bile ducts: Normal caliber. Common bile duct measures 3 mm.  Gallbladder: Within normal limits.  Pancreas: Poorly visualized.  Spleen: 9.1 cm. Within normal limits.  Right kidney: 11.3 cm. No hydronephrosis.  Left kidney: 10.8 cm. No hydronephrosis.  Ascites: None.  Aorta and IVC: Visualized portions are within normal limits.    IMPRESSION:  No acute sonographic abnormality.    < end of copied text >        < from: Xray Chest 1 View- PORTABLE-Routine (Xray Chest 1 View- PORTABLE-Routine in AM.) (08.31.24 @ 05:17) >  IMPRESSION: Left costophrenic angle excluded.  Low lung volumes. Endotracheal tube nasogastric tube have been removed.   Right internal jugular line reidentified in position. No change heart   mediastinum. Grossly clear lungs. No consolidation pneumothorax or   effusion    < end of copied text >

## 2024-09-03 NOTE — CHART NOTE - NSCHARTNOTEFT_GEN_A_CORE
23 year old PMHx multiple syncopal episodes during strenuous exercise presents with witnessed syncopal episode/cardiac arrest. Patient was running with her boyfriend, on mile 8 patient sat down on a bench and syncopated. Patients boyfriend states patient was unresponsive, tried elevating legs with no improvement, states unsure if patient had a pulse. Called EMS, who arrived to the scene 5-10 mins, boyfriend did not provide CPR while awaiting EMS. Patient was found PEA, 3 rounds of CPR, 1 epi in the field, found to be in V Tach and defibrillated. Upon arrival to ED, sinus bradycardia in 30s, atropine was given with improvement in HR, patient intubated for airway protection. CXR shows left sided infiltrates. CT chest shows possible aspiration PNA. Started on Zosyn 8/29. Continuous EEG without epileptiform activity. TTE completed yesterday EF of 30-35%, dyskinesis of apex of heart. Complete 24 hours total of continuous EEG. Trial off sedition yesterday, patient following commands, making purposeful movements. Slowly titrated from 0.2 LEVO and 5 of dobutamine for pressure and inotropic support. Now, no longer requiring pressors for 48 hours/ inotropics or 24 hours. Bedside Cardiac US shows grossly reduced EF consistent with previous ECHOs. East removed. Central Line and Arterial line removed on 9/2.    Plan:    Neuro:  #Anoxic Brain Injury  - CT Head reviewed no acute intracranial pathology  - Initially received 24 of cooling to 86 degrees   - Continuous video EEG 24 hours, no seizure activity yet  - Daily neuro checks  - Mental status is improving AxO x 2-3, some memory issues but overall up trending mentation.  - Neurology Consulted    CVS:  #Cardiogenic Shock  #Left Ventricular Heart Failure with reduced EF  #Myocardial Stunning after Cardiac Arrest  - 1.5L fluid restriction  - Repeat TTE 8/31 shows Left ventricular systolic function is moderately to severely decreased with an ejection fraction visually estimated at 25 to 30 % / trace pericardial effusion  - Bedside POCUS 9/2 grossly reduced EF, cardiac apex hypokinetic  - Weaned off Levophed 9/1 AM, Weaned off dobutamine 9/2 AM  - Wean high dose steroids, on solumedrol IV 40mg Q8, as treatment for autoimmune conditions, myocarditis  - Repeat TTE  - Cardiac monitor  - Daily EKG, Right axis deviation, RBBB  - CTA on admission, no PE  - Follows Dr. Soliman at [facility]  - ICD placement before discharge, pending CTA cornoary and Cardiac MRI  Cardiology following  EP team following    Resp:  - s/p extubation 8/30  - Saturating >95% on RA  - CXR: Left lung infiltrates, possible aspiration during resuscitation   - CT Chest possible aspiration PNA  - continue to monitor on   - Aspiration precautions    GI:  #Mildly elevated AST/ALT, possibly due to hypoperfusion of liver during arrest, continue to monitor  - CT abdomen/pelvis some periportal edema, otherwise normal  - Protonix for GI ppx  - LFTs downtrending  - Abdominal US unremarkable  - hepatitis A, B, and C negative  - Diet, Dash, passed bedside dysphagia screening    Renal:  - Daily Labs  - Strict I/Os, urine output appropriate  - East removed, appropriate urine output  - Replete lytes as needed    ID:  #Aspiration PNA  - Continue empiric treatment with Zosyn, started 8/29, END 9/4    MISC:  Followup Angiotension Converting Enzyme (Sarcoidosis),   TIFFANY, DsDNA ab, EBV, Tickborne panel negative for work up as possible causes of sudden cardiac arrest/ heartblock      Code Status: Full Code  Dispo: Stable for D/C to tele bed Case Discussed with Dr. Arriaza.    Eddie Joans MD

## 2024-09-03 NOTE — PROGRESS NOTE ADULT - SUBJECTIVE AND OBJECTIVE BOX
[facility], THE HEART [facility]ENTER                              [address] [place] [address]                                                 PHONE: [phone]                                                 FAX: [phone]  -----------------------------------------------------------------------------------------------  Pt seen and examined. FU for cardiac arrest    Overnight events/Complaints: Pt without complains. Has retrograde amnesia that seems to be improving.    RELEVANT PHYSICAL EXAM:  Neck: No obvious JVD  Cardiovascular: regular S1, S2  Respiratory: Lungs clear to auscultation; no crepitations, no wheeze  Musculoskeletal: No edema    Vital Signs Last 24 Hrs  T(C): 36.7 (03 Sep 2024 03:30), Max: 36.9 (02 Sep 2024 23:34)  T(F): 98 (03 Sep 2024 03:30), Max: 98.4 (02 Sep 2024 23:34)  HR: 67 (03 Sep 2024 10:00) (40 - 91)  BP: 104/61 (03 Sep 2024 10:00) (87/53 - 113/61)  BP(mean): 74 (03 Sep 2024 10:00) (65 - 83)  RR: 11 (03 Sep 2024 10:00) (9 - 26)  SpO2: 100% (03 Sep 2024 10:00) (96% - 100%)    Parameters below as of 03 Sep 2024 08:00  Patient On (Oxygen Delivery Method): room air      I&O's Summary    02 Sep 2024 07:01  -  03 Sep 2024 07:00  --------------------------------------------------------  IN: 1905 mL / OUT: 1800 mL / NET: 105 mL    03 Sep 2024 07:01  -  03 Sep 2024 10:50  --------------------------------------------------------  IN: 0 mL / OUT: 400 mL / NET: -400 mL            LABS:                        12.1   9.25  )-----------( 216      ( 03 Sep 2024 04:33 )             36.7     09-03    138  |  103  |  16.4  ----------------------------<  107<H>  4.9   |  25.0  |  0.45<L>    Ca    8.6      03 Sep 2024 04:33  Phos  3.5       Mg     2.0         TPro  6.3<L>  /  Alb  3.4  /  TBili  0.4  /  DBili  x   /  AST  59<H>  /  ALT  46<H>  /  AlkPhos  55  03        PT/INR - ( 02 Sep 2024 04:05 )   PT: 12.3 sec;   INR: 1.11 ratio      RADIOLOGY & ADDITIONAL STUDIES: (reviewed)  CXR was independently visualized/reviewed  and demonstrated:     CARDIOLOGY TESTING:(reviewed)     TELEMETRY independently visualized/reviewed and demonstrated : NSR    12 lead EKG independently visualized/reviewed  and demonstrated NSR    ECHOCARDIOGRAM independently visualized/reviewed and demonstrated :   1. Left ventricular systolic function is moderately to severely decreased with an ejection fraction visually estimated at 25 to 30 %.   2. Multiple segmental abnormalities exist. See findings.   3. Normal right ventricular cavity size and normal right ventricular systolic function.   4. Trace pericardial effusion noted adjacent to the posterior left ventricle.   5. Compared to the transthoracic echocardiogram performed on 2024, there is slight worsening of the LVEF.    ACC: [id number] EXAM:  CT ANGIO HEART CORONARY IC   ORDERED BY: LINDSAY GREEN     PROCEDURE DATE:  2024          INTERPRETATION:  .  Patient: NANY RODRIGES  : 2000  MRN: [id number]  ACC: [id number]  Order Date: 9/3/2024 9:18 AM  Exam: CT ANGIO HEART CORONARY    INDICATION: Cardiac arrest, syncope rule out anomalous coronary artery.  COMPARISON: CT chest 2024.  TECHNIQUE: Unenhanced prospective and enhanced retrospective images were   obtained of the heart using a Siemens Definition Edge scanner. Images   were obtained after the uneventful administration of nonionic intravenous   contrast.  CONTRAST VOLUME: Omnipaque 350. 80 cc administered. 20 cc discarded.  DOSE: 924 mGy.cm.  QUALITY:  Excellent.    FINDINGS:    CALCIUMSCORE: The calculated Agatston score is 0.    CORONARY: Co-coronary artery dominance. No evidence for anomalous   coronary arteries.    LEFT MAIN: Normal bifurcation into LAD, LCX.    LEFT ANTERIOR DESCENDING: There is one diagonal branch. Distal vessel   wraps around apex.    Proximal: Normal.  Mid: Normal.  Distal: Normal.    First diagonal: Normal.  Second diagonal: Normal.    LEFT CIRCUMFLEX: There is one obtuse marginal branch. Gives rise to the   PLV division    Proximal: Normal.  Mid/Distal: Normal.    First obtuse marginal: Normal.    PLV: Normal.    RIGHT CORONARY ARTERY: Gives rise to PDA.    Proximal: Normal.  Mid: Normal.  Distal: Normal.    Posterior descending: Small caliber vessel.    MORPHOLOGY: The LV is dilated.  Normal end diastolic left ventricular   wall thickness.    VALVES: The aortic valve is trileaflet. The anterior and posterior   leaflets of the mitral valve are normal..    PERICARDIUM: Normal pericardial contour. No pericardial effusion.    AORTA: Normal in diameter.    NONCARDIAC FINDINGS: Mild atelectasis within basilar left lower lobe.   Patchy opacities within the left lung are decreased.      IMPRESSION:    No anomalous coronary artery. No plaque or stenosis.    MEDICATIONS:(reviewed)  MEDICATIONS  (STANDING):  chlorhexidine 2% Cloths 1 Application(s) Topical daily  heparin   Injectable 5000 Unit(s) SubCutaneous every 12 hours  methylPREDNISolone sodium succinate Injectable 40 milliGRAM(s) IV Push every 12 hours  piperacillin/tazobactam IVPB.. 3.375 Gram(s) IV Intermittent every 8 hours  polyethylene glycol 3350 17 Gram(s) Oral daily  senna 2 Tablet(s) Oral at bedtime      ASSESSMENT AND PLAN:    23y Female with prior h/o near syncope and 1 episode of syncope s/p cardiac arrest while running. Patient was found PEA, 3 rounds of CPR, 1 epi in the field, found to be in VTach and defibrillated. Upon arrival to ED, sinus bradycardia in 30s, atropine was given with improvement in HR, patient intubated for airway protection. ECG in ED with junctional rhythm with retrograde p wave.  Off pressor support doing well hemodynamic   + trop and LV dysfunction likely stress induced  s/p coronary CTA with No anomalous coronary artery. No plaque or stenosis.    Cardiac arrest  - Cardiac MRI to r/o ARVD and reassess LVEF  - GDMT if persistent CMP noted on cMRI  - ICD for secondary prevention  - Genetic testing as outpt    Additional history obtained from family [independent historian] and plan of care discussed at bedside    Plan d/w ICU team

## 2024-09-04 LAB
CULTURE RESULTS: SIGNIFICANT CHANGE UP
CULTURE RESULTS: SIGNIFICANT CHANGE UP
SPECIMEN SOURCE: SIGNIFICANT CHANGE UP
SPECIMEN SOURCE: SIGNIFICANT CHANGE UP

## 2024-09-04 PROCEDURE — 75561 CARDIAC MRI FOR MORPH W/DYE: CPT | Mod: 26

## 2024-09-04 PROCEDURE — 99233 SBSQ HOSP IP/OBS HIGH 50: CPT

## 2024-09-04 RX ORDER — ACETAMINOPHEN 325 MG/1
650 TABLET ORAL EVERY 6 HOURS
Refills: 0 | Status: DISCONTINUED | OUTPATIENT
Start: 2024-09-04 | End: 2024-09-06

## 2024-09-04 RX ORDER — LISINOPRIL 10 MG/1
2.5 TABLET ORAL DAILY
Refills: 0 | Status: DISCONTINUED | OUTPATIENT
Start: 2024-09-04 | End: 2024-09-09

## 2024-09-04 RX ADMIN — Medication 5000 UNIT(S): at 18:23

## 2024-09-04 RX ADMIN — Medication 5000 UNIT(S): at 05:15

## 2024-09-04 NOTE — PROGRESS NOTE ADULT - ASSESSMENT
22 y/o F with a h/o multiple syncopal episodes getting workup outpatient admitted for PEA/VT cardiac arrest, course c/b asp PNA.     # PEA/VT cardiac arrest  # Cardiogenic shock s/p dobutamine drip  # Acute systolic heart failure  s/p rosc, now mental status near baseline  s/p dobutamine drip   CTH without acute findings   ct cardiac with no heart disease  TTE   Cardiac MRI pending  GDMT if persistent cardiomyopathy noted on cMRI  ICD for secondary prevention on discharge, ep consult to be called  Caty ventura following, ulices recs    # Acute hypoxemic respiratory failure  # Aspiration pneumonia  CT noted with asp pneumonitis   extubated 8/30 and now on RA  s/p zosyn     #Elevated LFTs  2/2 cardiac arrest  improved on repeat CMP    DVT ppx: HSQ  Diet: DASH/TLC  Dispo: pending cardiac MRI and cards recs    uma arnold in 2-3 days

## 2024-09-04 NOTE — PROGRESS NOTE ADULT - SUBJECTIVE AND OBJECTIVE BOX
[facility], THE [facility]                              [address] [place] [address]                                                 PHONE: [phone]                                                 FAX: [phone]  -----------------------------------------------------------------------------------------------  Pt seen and examined. FU for cardiac arrest    Overnight events/Complaints: Pt without complains. Has retrograde amnesia that seems to be improving. Family at bedside.     RELEVANT PHYSICAL EXAM:  Neck: No obvious JVD  Cardiovascular: regular S1, S2  Respiratory: Lungs clear to auscultation; no crepitations, no wheeze  Musculoskeletal: No edema    Vital Signs Last 24 Hrs  T(C): 36.6 (04 Sep 2024 06:15), Max: 36.9 (03 Sep 2024 10:59)  T(F): 97.8 (04 Sep 2024 06:15), Max: 98.4 (03 Sep 2024 10:59)  HR: 45 (04 Sep 2024 06:15) (37 - 70)  BP: 92/59 (04 Sep 2024 06:15) (90/55 - 111/71)  BP(mean): 68 (04 Sep 2024 05:45) (65 - 83)  RR: 16 (04 Sep 2024 06:15) (13 - 24)  SpO2: 99% (04 Sep 2024 06:15) (94% - 100%)    Parameters below as of 04 Sep 2024 05:45  Patient On (Oxygen Delivery Method): room air      I&O's Summary    03 Sep 2024 07:01  -  04 Sep 2024 07:00  --------------------------------------------------------  IN: 1525 mL / OUT: 700 mL / NET: 825 mL            LABS:                        12.1   9.25  )-----------( 216      ( 03 Sep 2024 04:33 )             36.7     09-03    138  |  103  |  16.4  ----------------------------<  107<H>  4.9   |  25.0  |  0.45<L>    Ca    8.6      03 Sep 2024 04:33  Phos  3.5     -  Mg     2.0     -    TPro  6.3<L>  /  Alb  3.4  /  TBili  0.4  /  DBili  x   /  AST  59<H>  /  ALT  46<H>  /  AlkPhos  55  -    RADIOLOGY & ADDITIONAL STUDIES: (reviewed)  CXR was independently visualized/reviewed  and demonstrated: clear lungs    CARDIOLOGY TESTING:(reviewed)     TELEMETRY independently visualized/reviewed and demonstrated : NSR    12 lead EKG independently visualized/reviewed  and demonstrated NSR    ECHOCARDIOGRAM independently visualized/reviewed and demonstrated : 2024  1. Left ventricular cavity is normal in size. Left ventricular wall thickness is normal. Left ventricular systolic function is severely decreased with an ejection fraction visually estimated at 30 to 35 %.   2. Pulmonary artery systolic pressure could not be estimated.   3. Trace pericardial effusion.    1. Left ventricular systolic function is moderately to severely decreased with an ejection fraction visually estimated at 25 to 30 %.   2. Multiple segmental abnormalities exist. See findings.   3. Normal right ventricular cavity size and normal right ventricular systolic function.   4. Trace pericardial effusion noted adjacent to the posterior left ventricle.   5. Compared to the transthoracic echocardiogram performed on 2024, there is slight worsening of the LVEF.    ACC: [id number] EXAM:  CT ANGIO HEART CORONARY IC   ORDERED BY: LINDSAY GREEN     PROCEDURE DATE:  2024          INTERPRETATION:  .  Patient: NANY RODRIGES  : 2000  MRN: [id number]  ACC: [id number]  Order Date: 9/3/2024 9:18 AM  Exam: CT ANGIO HEART CORONARY    INDICATION: Cardiac arrest, syncope rule out anomalous coronary artery.  COMPARISON: CT chest 2024.  TECHNIQUE: Unenhanced prospective and enhanced retrospective images were   obtained of the heart using a Siemens Definition Edge scanner. Images   were obtained after the uneventful administration of nonionic intravenous   contrast.  CONTRAST VOLUME: Omnipaque 350. 80 cc administered. 20 cc discarded.  DOSE: 924 mGy.cm.  QUALITY:  Excellent.    FINDINGS:    CALCIUMSCORE: The calculated Agatston score is 0.    CORONARY: Co-coronary artery dominance. No evidence for anomalous   coronary arteries.    LEFT MAIN: Normal bifurcation into LAD, LCX.    LEFT ANTERIOR DESCENDING: There is one diagonal branch. Distal vessel   wraps around apex.    Proximal: Normal.  Mid: Normal.  Distal: Normal.    First diagonal: Normal.  Second diagonal: Normal.    LEFT CIRCUMFLEX: There is one obtuse marginal branch. Gives rise to the   PLV division    Proximal: Normal.  Mid/Distal: Normal.    First obtuse marginal: Normal.    PLV: Normal.    RIGHT CORONARY ARTERY: Gives rise to PDA.    Proximal: Normal.  Mid: Normal.  Distal: Normal.    Posterior descending: Small caliber vessel.    MORPHOLOGY: The LV is dilated.  Normal end diastolic left ventricular   wall thickness.    VALVES: The aortic valve is trileaflet. The anterior and posterior   leaflets of the mitral valve are normal..    PERICARDIUM: Normal pericardial contour. No pericardial effusion.    AORTA: Normal in diameter.    NONCARDIAC FINDINGS: Mild atelectasis within basilar left lower lobe.   Patchy opacities within the left lung are decreased.      IMPRESSION:    No anomalous coronary artery. No plaque or stenosis.    MEDICATIONS:(reviewed)  MEDICATIONS  (STANDING):  chlorhexidine 2% Cloths 1 Application(s) Topical daily  heparin   Injectable 5000 Unit(s) SubCutaneous every 12 hours  polyethylene glycol 3350 17 Gram(s) Oral daily  senna 2 Tablet(s) Oral at bedtime    ASSESSMENT AND PLAN:    23y Female with prior h/o near syncope and 1 episode of syncope s/p cardiac arrest while running. Patient was found PEA, 3 rounds of CPR, 1 epi in the field, found to be in VTach and defibrillated. Upon arrival to ED, sinus bradycardia in 30s, atropine was given with improvement in HR, patient intubated for airway protection. ECG in ED with junctional rhythm with retrograde p wave.  Off pressor support doing well hemodynamic   + trop and LV dysfunction likely stress induced  s/p coronary CTA with No anomalous coronary artery. No plaque or stenosis.    Cardiac arrest  - Cardiac MRI to r/o ARVD and reassess LVEF  - Echo with persistent CMP. Will start low dose ACE-I given low BP and defer beta blockers given bradycardia  - ICD for secondary prevention. Pending cMRI results, will have Dr. Lindsey evaluate  - Genetic testing as outpt    Additional history obtained from family [independent historian] and plan of care discussed at bedside

## 2024-09-04 NOTE — PROGRESS NOTE ADULT - SUBJECTIVE AND OBJECTIVE BOX
NANY RODRIGES    [id number]    23y      Female    INTERVAL HPI/OVERNIGHT EVENTS:  patient being seen for cardiac arrest. Patient seen at bedside with father is awake and alert and denies any complaints.     patient is sinus bradycardia on monitor      REVIEW OF SYSTEMS:    CONSTITUTIONAL: No fever, weight loss, or fatigue  RESPIRATORY: No cough, wheezing, hemoptysis; No shortness of breath  CARDIOVASCULAR: No chest pain, palpitations  GASTROINTESTINAL: No abdominal or epigastric pain. No nausea, vomiting  NEUROLOGICAL: No headaches, memory loss, loss of strength.  MISCELLANEOUS:      Vital Signs Last 24 Hrs  T(C): 36.6 (04 Sep 2024 06:15), Max: 36.6 (03 Sep 2024 23:33)  T(F): 97.8 (04 Sep 2024 06:15), Max: 97.9 (03 Sep 2024 23:33)  HR: 45 (04 Sep 2024 06:15) (37 - 63)  BP: 92/59 (04 Sep 2024 06:15) (90/55 - 111/71)  BP(mean): 68 (04 Sep 2024 05:45) (65 - 82)  RR: 16 (04 Sep 2024 06:15) (13 - 21)  SpO2: 99% (04 Sep 2024 06:15) (94% - 100%)    Parameters below as of 04 Sep 2024 05:45  Patient On (Oxygen Delivery Method): room air        PHYSICAL EXAM:    GENERAL: NAD, pleasant  HEENT: PERRL, +EOMI  NECK: soft, Supple, No JVD,   CHEST/LUNG: Clear to auscultation bilaterally; No wheezing  HEART: S1S2+, bradycardia  ABDOMEN: Soft, Nontender, Nondistended; Bowel sounds present  EXTREMITIES:  2+ Peripheral Pulses, No clubbing, cyanosis, or edema  SKIN: No rashes or lesions  NEURO: AAOX3, no focal deficits, no motor r sensory loss  PSYCH: normal mood      LABS:                        12.1   9.25  )-----------( 216      ( 03 Sep 2024 04:33 )             36.7     09-03    138  |  103  |  16.4  ----------------------------<  107<H>  4.9   |  25.0  |  0.45<L>    Ca    8.6      03 Sep 2024 04:33  Phos  3.5     09-03  Mg     2.0     09-03    TPro  6.3<L>  /  Alb  3.4  /  TBili  0.4  /  DBili  x   /  AST  59<H>  /  ALT  46<H>  /  AlkPhos  55  09-03      Urinalysis Basic - ( 03 Sep 2024 04:33 )    Color: x / Appearance: x / SG: x / pH: x  Gluc: 107 mg/dL / Ketone: x  / Bili: x / Urobili: x   Blood: x / Protein: x / Nitrite: x   Leuk Esterase: x / RBC: x / WBC x   Sq Epi: x / Non Sq Epi: x / Bacteria: x          MEDICATIONS  (STANDING):  chlorhexidine 2% Cloths 1 Application(s) Topical daily  heparin   Injectable 5000 Unit(s) SubCutaneous every 12 hours  lisinopril 2.5 milliGRAM(s) Oral daily  polyethylene glycol 3350 17 Gram(s) Oral daily  senna 2 Tablet(s) Oral at bedtime    MEDICATIONS  (PRN):  acetaminophen     Tablet .. 650 milliGRAM(s) Oral every 6 hours PRN Temp greater or equal to 38C (100.4F), Mild Pain (1 - 3)  aluminum hydroxide/magnesium hydroxide/simethicone Suspension 30 milliLiter(s) Oral every 4 hours PRN Dyspepsia  sodium chloride 0.9% lock flush 10 milliLiter(s) IV Push every 1 hour PRN Pre/post blood products, medications, blood draw, and to maintain line patency      RADIOLOGY & ADDITIONAL TESTS:    cardiac mri

## 2024-09-05 LAB
A1C WITH ESTIMATED AVERAGE GLUCOSE RESULT: 5.3 % — SIGNIFICANT CHANGE UP (ref 4–5.6)
ALBUMIN SERPL ELPH-MCNC: 3.4 G/DL — SIGNIFICANT CHANGE UP (ref 3.3–5.2)
ALP SERPL-CCNC: 59 U/L — SIGNIFICANT CHANGE UP (ref 40–120)
ALT FLD-CCNC: 125 U/L — HIGH
ANION GAP SERPL CALC-SCNC: 15 MMOL/L — SIGNIFICANT CHANGE UP (ref 5–17)
AST SERPL-CCNC: 92 U/L — HIGH
BILIRUB SERPL-MCNC: 0.3 MG/DL — LOW (ref 0.4–2)
BUN SERPL-MCNC: 14.4 MG/DL — SIGNIFICANT CHANGE UP (ref 8–20)
CALCIUM SERPL-MCNC: 9 MG/DL — SIGNIFICANT CHANGE UP (ref 8.4–10.5)
CHLORIDE SERPL-SCNC: 101 MMOL/L — SIGNIFICANT CHANGE UP (ref 96–108)
CHOLEST SERPL-MCNC: 217 MG/DL — HIGH
CO2 SERPL-SCNC: 22 MMOL/L — SIGNIFICANT CHANGE UP (ref 22–29)
CREAT SERPL-MCNC: 0.46 MG/DL — LOW (ref 0.5–1.3)
EGFR: 139 ML/MIN/1.73M2 — SIGNIFICANT CHANGE UP
ESTIMATED AVERAGE GLUCOSE: 105 MG/DL — SIGNIFICANT CHANGE UP (ref 68–114)
GLUCOSE SERPL-MCNC: 102 MG/DL — HIGH (ref 70–99)
HDLC SERPL-MCNC: 64 MG/DL — SIGNIFICANT CHANGE UP
LIPID PNL WITH DIRECT LDL SERPL: 125 MG/DL — HIGH
MAGNESIUM SERPL-MCNC: 1.9 MG/DL — SIGNIFICANT CHANGE UP (ref 1.8–2.6)
NON HDL CHOLESTEROL: 153 MG/DL — HIGH
POTASSIUM SERPL-MCNC: 3.8 MMOL/L — SIGNIFICANT CHANGE UP (ref 3.5–5.3)
POTASSIUM SERPL-SCNC: 3.8 MMOL/L — SIGNIFICANT CHANGE UP (ref 3.5–5.3)
PROT SERPL-MCNC: 6 G/DL — LOW (ref 6.6–8.7)
SODIUM SERPL-SCNC: 138 MMOL/L — SIGNIFICANT CHANGE UP (ref 135–145)
TRIGL SERPL-MCNC: 142 MG/DL — SIGNIFICANT CHANGE UP

## 2024-09-05 PROCEDURE — 99232 SBSQ HOSP IP/OBS MODERATE 35: CPT

## 2024-09-05 RX ADMIN — LISINOPRIL 2.5 MILLIGRAM(S): 10 TABLET ORAL at 14:43

## 2024-09-05 NOTE — PROGRESS NOTE ADULT - SUBJECTIVE AND OBJECTIVE BOX
NANY RODRIGES    [id number]    23y      Female    INTERVAL HPI/OVERNIGHT EVENTS: patient being seen for cardiac arrest.  patient seen at bedside with family and denies any complaints      REVIEW OF SYSTEMS:    CONSTITUTIONAL: No fever, weight loss, or fatigue  RESPIRATORY: No cough, wheezing, hemoptysis; No shortness of breath  CARDIOVASCULAR: No chest pain, palpitations  GASTROINTESTINAL: No abdominal or epigastric pain. No nausea, vomiting  NEUROLOGICAL: No headaches, memory loss, loss of strength.  MISCELLANEOUS:      Vital Signs Last 24 Hrs  T(C): 36.7 (05 Sep 2024 08:07), Max: 36.7 (05 Sep 2024 08:07)  T(F): 98.1 (05 Sep 2024 08:07), Max: 98.1 (05 Sep 2024 08:07)  HR: 46 (05 Sep 2024 08:07) (41 - 58)  BP: 93/62 (05 Sep 2024 08:07) (93/62 - 105/62)  BP(mean): --  RR: 16 (05 Sep 2024 08:07) (16 - 18)  SpO2: 98% (05 Sep 2024 08:07) (97% - 100%)    Parameters below as of 05 Sep 2024 08:07  Patient On (Oxygen Delivery Method): room air        PHYSICAL EXAM:  GENERAL: NAD, pleasant  HEENT: PERRL, +EOMI  NECK: soft, Supple, No JVD,   CHEST/LUNG: Clear to auscultation bilaterally; No wheezing  HEART: S1S2+, bradycardia  ABDOMEN: Soft, Nontender, Nondistended; Bowel sounds present  EXTREMITIES:  2+ Peripheral Pulses, No clubbing, cyanosis, or edema  SKIN: No rashes or lesions  NEURO: AAOX3, no focal deficits, no motor r sensory loss  PSYCH: normal mood    LABS:    09-05    138  |  101  |  14.4  ----------------------------<  102<H>  3.8   |  22.0  |  0.46<L>    Ca    9.0      05 Sep 2024 09:40  Mg     1.9     09-05    TPro  6.0<L>  /  Alb  3.4  /  TBili  0.3<L>  /  DBili  x   /  AST  92<H>  /  ALT  125<H>  /  AlkPhos  59  09-05      Urinalysis Basic - ( 05 Sep 2024 09:40 )    Color: x / Appearance: x / SG: x / pH: x  Gluc: 102 mg/dL / Ketone: x  / Bili: x / Urobili: x   Blood: x / Protein: x / Nitrite: x   Leuk Esterase: x / RBC: x / WBC x   Sq Epi: x / Non Sq Epi: x / Bacteria: x          MEDICATIONS  (STANDING):  chlorhexidine 2% Cloths 1 Application(s) Topical daily  dextrose 5% + sodium chloride 0.9%. 1000 milliLiter(s) (75 mL/Hr) IV Continuous <Continuous>  heparin   Injectable 5000 Unit(s) SubCutaneous every 12 hours  lisinopril 2.5 milliGRAM(s) Oral daily  polyethylene glycol 3350 17 Gram(s) Oral daily  senna 2 Tablet(s) Oral at bedtime    MEDICATIONS  (PRN):  acetaminophen     Tablet .. 650 milliGRAM(s) Oral every 6 hours PRN Temp greater or equal to 38C (100.4F), Mild Pain (1 - 3)  aluminum hydroxide/magnesium hydroxide/simethicone Suspension 30 milliLiter(s) Oral every 4 hours PRN Dyspepsia  sodium chloride 0.9% lock flush 10 milliLiter(s) IV Push every 1 hour PRN Pre/post blood products, medications, blood draw, and to maintain line patency      RADIOLOGY & ADDITIONAL TESTS:

## 2024-09-05 NOTE — PROGRESS NOTE ADULT - ASSESSMENT
24 y/o F with a h/o multiple syncopal episodes getting workup outpatient admitted for PEA/VT cardiac arrest, course c/b asp PNA.     # PEA/VT cardiac arrest  # Cardiogenic shock s/p dobutamine drip  # Acute systolic heart failure  s/p rosc, now mental status near baseline  s/p dobutamine drip   CTH without acute findings   ct cardiac with no heart disease  TTE appreciated  Cardiac MRI performed, shows no scarring  npo pmn for icd in am   - ep to speak to patient     # Acute hypoxemic respiratory failure  # Aspiration pneumonia  CT noted with asp pneumonitis   extubated 8/30 and now on RA  s/p zosyn     #Elevated LFTs  2/2 cardiac arrest  improved on repeat CMP    #hld - diet and exercise    npo pmn   dc post icd placement christine childress

## 2024-09-05 NOTE — PROGRESS NOTE ADULT - SUBJECTIVE AND OBJECTIVE BOX
[place] [facility] - [facility], THE HEART CENTER                                   [address] [place] [address]                                                      PHONE: [phone]                                                         FAX: [phone]  [email or web address]  ---------------------------------------------------------------------------------------------------------------------------------    Overnight events/patient complaints:      NAD feeling well today     Allergy Status Unknown    MEDICATIONS  (STANDING):  chlorhexidine 2% Cloths 1 Application(s) Topical daily  dextrose 5% + sodium chloride 0.9%. 1000 milliLiter(s) (75 mL/Hr) IV Continuous <Continuous>  heparin   Injectable 5000 Unit(s) SubCutaneous every 12 hours  lisinopril 2.5 milliGRAM(s) Oral daily  polyethylene glycol 3350 17 Gram(s) Oral daily  senna 2 Tablet(s) Oral at bedtime    MEDICATIONS  (PRN):  acetaminophen     Tablet .. 650 milliGRAM(s) Oral every 6 hours PRN Temp greater or equal to 38C (100.4F), Mild Pain (1 - 3)  aluminum hydroxide/magnesium hydroxide/simethicone Suspension 30 milliLiter(s) Oral every 4 hours PRN Dyspepsia  sodium chloride 0.9% lock flush 10 milliLiter(s) IV Push every 1 hour PRN Pre/post blood products, medications, blood draw, and to maintain line patency      Vital Signs Last 24 Hrs  T(C): 36.7 (05 Sep 2024 08:07), Max: 36.7 (05 Sep 2024 08:07)  T(F): 98.1 (05 Sep 2024 08:07), Max: 98.1 (05 Sep 2024 08:07)  HR: 46 (05 Sep 2024 08:07) (41 - 58)  BP: 93/62 (05 Sep 2024 08:07) (93/62 - 105/62)  BP(mean): --  RR: 16 (05 Sep 2024 08:07) (16 - 18)  SpO2: 98% (05 Sep 2024 08:07) (97% - 100%)    Parameters below as of 05 Sep 2024 08:07  Patient On (Oxygen Delivery Method): room air      ICU Vital Signs Last 24 Hrs  NANY RODRIGES  I&O's Detail    04 Sep 2024 07:01  -  05 Sep 2024 07:00  --------------------------------------------------------  IN:    Oral Fluid: 480 mL  Total IN: 480 mL    OUT:  Total OUT: 0 mL    Total NET: 480 mL        I&O's Summary    04 Sep 2024 07:01  -  05 Sep 2024 07:00  --------------------------------------------------------  IN: 480 mL / OUT: 0 mL / NET: 480 mL      Drug Dosing Weight  NANY RODRIGES      PHYSICAL EXAM:  General: Appears well developed, alert and cooperative.  HEENT: Head; normocephalic, atraumatic.  Eyes: Pupils reactive, cornea wnl.  Neck: Supple, no nodes adenopathy, no NVD or carotid bruit or thyromegaly.  CARDIOVASCULAR: Normal S1 and S2, No murmur, rub, gallop or lift.   LUNGS: No rales, rhonchi or wheeze. Normal breath sounds bilaterally.  ABDOMEN: Soft, nontender without mass or organomegaly. bowel sounds normoactive.  EXTREMITIES: No clubbing, cyanosis or edema. Distal pulses wnl.   SKIN: warm and dry with normal turgor.  NEURO: Alert/oriented x 3/normal motor exam. No pathologic reflexes.    PSYCH: normal affect.        LABS:          NANY RODRIGES            RADIOLOGY & ADDITIONAL STUDIES:    INTERPRETATION OF TELEMETRY (personally reviewed):       1. Left ventricular systolic function is moderately to severely decreased with an ejection fraction visually estimated at 25 to 30 %.   2. Multiple segmental abnormalities exist. See findings.   3. Normal right ventricular cavity size and normal right ventricular systolic function.   4. Trace pericardial effusion noted adjacent to the posterior left ventricle.   5. Compared to the transthoracic echocardiogram performed on 8/29/2024, there is slight worsening of the LVEF.    < end of copied text >    < from: CT Angio Cardiac w/ IV Cont (09.03.24 @ 09:18) >    FINDINGS:    CALCIUMSCORE: The calculated Agatston score is 0.    CORONARY: Co-coronary artery dominance. No evidence for anomalous   coronary arteries.    LEFT MAIN: Normal bifurcation into LAD, LCX.    LEFT ANTERIOR DESCENDING: There is one diagonal branch. Distal vessel   wraps around apex.    Proximal: Normal.  Mid: Normal.  Distal: Normal.    First diagonal: Normal.  Second diagonal: Normal.    LEFT CIRCUMFLEX: There is one obtuse marginal branch. Gives rise to the   PLV division    Proximal: Normal.  Mid/Distal: Normal.    First obtuse marginal: Normal.    PLV: Normal.    RIGHT CORONARY ARTERY: Gives rise to PDA.    Proximal: Normal.  Mid: Normal.  Distal: Normal.    Posterior descending: Small caliber vessel.    MORPHOLOGY: The LV is dilated.  Normal end diastolic left ventricular   wall thickness.    VALVES: The aortic valve is trileaflet. The anterior and posterior   leaflets of the mitral valve are normal..    PERICARDIUM: Normal pericardial contour. No pericardial effusion.    AORTA: Normal in diameter.    NONCARDIAC FINDINGS: Mild atelectasis within basilar left lower lobe.   Patchy opacities within the left lung are decreased.      IMPRESSION:    No anomalous coronary artery. No plaque or stenosis.    --- End of Report ---    < end of copied text >        < from: MR Cardiac w/wo IV Cont (09.04.24 @ 16:00) >  FINDINGS:    Chambers: The left ventricle is normal in size. There is no left   ventricular hypertrophy/thickening. Wall motion of the left ventricular   mid wall segments is hypokinetic. The wall motion of the left ventricular   basilar and apical wall segments appear preserved.    The left atrium is normal in size.    The right ventricle is normal in size. There is normal right ventricular   wall motion and function.    The right atrium is normal in size.    No immediate resting perfusion defects identified within the left   ventricular myocardium after administration of gadolinium. There is no   convincing evidence of late gadolinium enhancement of the left or right   ventricular myocardium.    LVEDV is 179 mL  LVESV is 85 mL  LVSV is 94 mL  LVEF is 53%    RVEDV is 150 mL  RVESV is 62 mL  RVSV is 88 mL  RVEF is 59%      Valves: No CMR evidence of valvular abnormality.    Vessels: Thoracic aorta is normal in caliber.    Thorax: Small pericardial effusion. Trace left pleural effusion. No large   mediastinal or axillary nodes.    Imaged Abdomen: Unremarkable.    IMPRESSION:.    No convincing evidence of myocardial scarring.    There is hypokinesis of the mid LV wall segments and preserved wall   motion of the LV basilar and apical wall segments.    The calculated left ventricular ejection fraction is 53%.    Small pericardial effusion.    --- End of Report ---    < end of copied text >        23y Female with prior h/o near syncope and 1 episode of syncope s/p cardiac arrest while running. Patient was found PEA, 3 rounds of CPR, 1 epi in the field, found to be in VTach and defibrillated. Upon arrival to ED, sinus bradycardia in 30s, atropine was given with improvement in HR, patient intubated for airway protection. ECG in ED with junctional rhythm with retrograde p wave.    Off pressor support doing well hemodynamic     + trop and LV dysfunction likely stress induce     CT coronary stable No anomalous coronary artery. No plaque or stenosis.      Cardiac MRI No convincing evidence of myocardial scarring, There is hypokinesis of the mid LV wall segments and preserved wall motion of the LV basilar and apical wall segments. The calculated left ventricular ejection fraction is 53%.  Small pericardial effusion.    Cardiac arrest  - NPO after midnight for EP study and ICD for secondary prevention

## 2024-09-06 DIAGNOSIS — I49.8 OTHER SPECIFIED CARDIAC ARRHYTHMIAS: ICD-10-CM

## 2024-09-06 PROBLEM — R55 SYNCOPE AND COLLAPSE: Chronic | Status: ACTIVE | Noted: 2024-08-29

## 2024-09-06 LAB
A PHAGOCYTOPH IGG TITR SER IF: SIGNIFICANT CHANGE UP
ALBUMIN SERPL ELPH-MCNC: 3.3 G/DL — SIGNIFICANT CHANGE UP (ref 3.3–5.2)
ALP SERPL-CCNC: 56 U/L — SIGNIFICANT CHANGE UP (ref 40–120)
ALT FLD-CCNC: 167 U/L — HIGH
ANION GAP SERPL CALC-SCNC: 11 MMOL/L — SIGNIFICANT CHANGE UP (ref 5–17)
AST SERPL-CCNC: 117 U/L — HIGH
B BURGDOR AB SER QL IA: 0.2 IV — SIGNIFICANT CHANGE UP
B MICROTI IGG TITR SER: SIGNIFICANT CHANGE UP
BILIRUB SERPL-MCNC: 0.2 MG/DL — LOW (ref 0.4–2)
BUN SERPL-MCNC: 25.9 MG/DL — HIGH (ref 8–20)
CALCIUM SERPL-MCNC: 8.9 MG/DL — SIGNIFICANT CHANGE UP (ref 8.4–10.5)
CHLORIDE SERPL-SCNC: 103 MMOL/L — SIGNIFICANT CHANGE UP (ref 96–108)
CO2 SERPL-SCNC: 26 MMOL/L — SIGNIFICANT CHANGE UP (ref 22–29)
CREAT SERPL-MCNC: 0.73 MG/DL — SIGNIFICANT CHANGE UP (ref 0.5–1.3)
E CHAFFEENSIS IGG TITR SER IF: SIGNIFICANT CHANGE UP
EGFR: 119 ML/MIN/1.73M2 — SIGNIFICANT CHANGE UP
GLUCOSE SERPL-MCNC: 81 MG/DL — SIGNIFICANT CHANGE UP (ref 70–99)
MAGNESIUM SERPL-MCNC: 2 MG/DL — SIGNIFICANT CHANGE UP (ref 1.8–2.6)
POTASSIUM SERPL-MCNC: 4.1 MMOL/L — SIGNIFICANT CHANGE UP (ref 3.5–5.3)
POTASSIUM SERPL-SCNC: 4.1 MMOL/L — SIGNIFICANT CHANGE UP (ref 3.5–5.3)
PROT SERPL-MCNC: 6.1 G/DL — LOW (ref 6.6–8.7)
SODIUM SERPL-SCNC: 140 MMOL/L — SIGNIFICANT CHANGE UP (ref 135–145)

## 2024-09-06 PROCEDURE — 99232 SBSQ HOSP IP/OBS MODERATE 35: CPT

## 2024-09-06 RX ORDER — ONDANSETRON 2 MG/ML
4 INJECTION, SOLUTION INTRAMUSCULAR; INTRAVENOUS ONCE
Refills: 0 | Status: COMPLETED | OUTPATIENT
Start: 2024-09-06 | End: 2024-09-06

## 2024-09-06 RX ADMIN — ONDANSETRON 4 MILLIGRAM(S): 2 INJECTION, SOLUTION INTRAMUSCULAR; INTRAVENOUS at 03:11

## 2024-09-06 NOTE — PROGRESS NOTE ADULT - ASSESSMENT
22 y/o F with a h/o multiple syncopal episodes getting workup outpatient admitted for PEA/VT cardiac arrest, s/p ROSC after 3 rounds of CPR, intubated and admitted to the ICU s/p with cardiogenic shock and acute systolic heart failure. She was started on dobutamine and pressors, now both weaned off. TTE with reduced EF 25-30%.   Course further c/b acute resp failure wiht hypoxia suspect 2/2 asp and cardaic arrest, on Zosyn. extubated and stable on RA, transferred to medicine on 9/3. Patient had cardiac mri showing no scar tissue and imrpvoed ef. Patient is now planned for leadless ICD as per EP on monday.     # PEA/VT cardiac arrest/ brugada  # Cardiogenic shock s/p dobutamine drip  # Acute systolic heart failure  s/p rosc, now mental status near baseline  s/p dobutamine drip   CTH without acute findings   ct cardiac with no heart disease  TTE appreciated  Cardiac MRI performed, shows no scarring  npo pmn for icd monday   - ct surgery consult called for assistance on procedure    # Acute hypoxemic respiratory failure  # Aspiration pneumonia  CT noted with asp pneumonitis   extubated 8/30 and now on RA  s/p zosyn     #Elevated LFTs  2/2 cardiac arrest  stable  - avoid toxic agents  - us abd done a few days ago    #hld - diet and exercise  total chol 217    dispo - icd on monday   - needs genetic testing as outpatient for cardiac arrest

## 2024-09-06 NOTE — PROGRESS NOTE ADULT - SUBJECTIVE AND OBJECTIVE BOX
[place] [facility] - [facility], THE HEART CENTER                                   [address] [place] [address]                                                      PHONE: [phone]                                                         FAX: [phone]  [email or web address]  ---------------------------------------------------------------------------------------------------------------------------------    Overnight events/patient complaints: feels well, back to baseline state, tele unremarkable      Allergy Status Unknown    MEDICATIONS  (STANDING):  chlorhexidine 2% Cloths 1 Application(s) Topical daily  heparin   Injectable 5000 Unit(s) SubCutaneous every 12 hours  lisinopril 2.5 milliGRAM(s) Oral daily  polyethylene glycol 3350 17 Gram(s) Oral daily  senna 2 Tablet(s) Oral at bedtime    MEDICATIONS  (PRN):  aluminum hydroxide/magnesium hydroxide/simethicone Suspension 30 milliLiter(s) Oral every 4 hours PRN Dyspepsia  sodium chloride 0.9% lock flush 10 milliLiter(s) IV Push every 1 hour PRN Pre/post blood products, medications, blood draw, and to maintain line patency      Vital Signs Last 24 Hrs  T(C): 36.5 (06 Sep 2024 09:00), Max: 36.6 (05 Sep 2024 22:16)  T(F): 97.7 (06 Sep 2024 09:00), Max: 97.9 (06 Sep 2024 06:00)  HR: 60 (06 Sep 2024 09:00) (54 - 76)  BP: 101/62 (06 Sep 2024 09:00) (96/59 - 135/77)  BP(mean): 84 (06 Sep 2024 06:00) (71 - 84)  RR: 18 (06 Sep 2024 09:00) (18 - 18)  SpO2: 98% (06 Sep 2024 09:00) (96% - 100%)    Parameters below as of 06 Sep 2024 09:00  Patient On (Oxygen Delivery Method): room air      Daily     Daily   ICU Vital Signs Last 24 Hrs  NANY RODRIGES  I&O's Detail    I&O's Summary    Drug Dosing Weight  NANY HANNAELIZABETH      PHYSICAL EXAM:  General: Appears alert and cooperative.  HEENT: Head; normocephalic, atraumatic.  Eyes: Pupils reactive, cornea wnl.  Neck: Supple, no nodes adenopathy, no NVD or carotid bruit or thyromegaly.  CARDIOVASCULAR: Normal S1 and S2, No murmur, rub, gallop or lift.   LUNGS: No rales, rhonchi or wheeze. Normal breath sounds bilaterally.  ABDOMEN: Soft, nontender without mass or organomegaly. bowel sounds normoactive.  EXTREMITIES: No clubbing, cyanosis or edema. Distal pulses wnl.   SKIN: warm and dry with normal turgor.  NEURO: Alert/oriented x 3/normal motor exam. No pathologic reflexes.    PSYCH: normal affect.        LABS:        140  |  103  |  25.9<H>  ----------------------------<  81  4.1   |  26.0  |  0.73    Ca    8.9      06 Sep 2024 04:38  Mg     2.0         TPro  6.1<L>  /  Alb  3.3  /  TBili  0.2<L>  /  DBili  x   /  AST  117<H>  /  ALT  167<H>  /  AlkPhos  56      NANY RODRIGES        Urinalysis Basic - ( 06 Sep 2024 04:38 )    Color: x / Appearance: x / SG: x / pH: x  Gluc: 81 mg/dL / Ketone: x  / Bili: x / Urobili: x   Blood: x / Protein: x / Nitrite: x   Leuk Esterase: x / RBC: x / WBC x   Sq Epi: x / Non Sq Epi: x / Bacteria: x        RADIOLOGY & ADDITIONAL STUDIES:< from: Xray Chest 1 View- PORTABLE-Routine (Xray Chest 1 View- PORTABLE-Routine in AM.) (24 @ 05:17) >    ACC: [id number] EXAM:  XR CHEST PORTABLE ROUTINE 1V   ORDERED BY:      PROCEDURE DATE:  2024          INTERPRETATION:  Follow-up.    AP chest. Prior dated 2024.    IMPRESSION: Left costophrenic angle excluded.  Low lung volumes. Endotracheal tube nasogastric tube have been removed.   Right internal jugular line reidentified in position. No change heart   mediastinum. Grossly clear lungs. No consolidation pneumothorax or   effusion    --- End of Report ---        < end of copied text >      INTERPRETATION OF TELEMETRY (personally reviewed):    ECG:< from: 12 Lead ECG (24 @ 07:52) >  Diagnosis Line Sinus bradycardia  Right axis deviation  Incomplete right bundle branch block  Septal infarct , age undetermined  Nonspecific ST and T wave abnormality  Abnormal ECG    Confirmed by Kai Brown DO ([id number]) on 9/3/2024 5:38:52 PM    < end of copied text >      ECHO:< from: TTE W or WO Ultrasound Enhancing Agent (24 @ 11:23) >  TRANSTHORACIC ECHOCARDIOGRAM REPORT  ________________________________________________________________________________                                      _______       Pt. Name:       NANY RODRIGES Study Date:    9/3/2024  MRN:            [id number]       YOB: 2000  Accession #:    [id number]        Age:           23 years  Account#:       [id number]       Gender:        F  Heart Rate:                      Height:        65.00 in (165.10 cm)  Rhythm:                          Weight:        140.00 lb (63.50 kg)  Blood Pressure: 98/61 mmHg       BSA/BMI:       1.70 m² / 23.30 kg/m²  ________________________________________________________________________________________  Referring Physician:    [id number] Cuauhtemoc Alicea  Interpreting Physician: Sammy Lucio MD  Primary Sonographer:    Noa Powell    CPT:                ECHO TTE WITH CON COMP W DOPP - .m;DEFINITY ECHO                      CONTRAST PER ML - .m  Indication(s):      Cardiogenic shock - R57.0  Procedure:          Transthoracic echocardiogram with 2-D, M-mode and complete                      spectral and color flow Doppler.  Ordering Location:  [facility]  Admission Status:   Inpatient  Contrast Injection: Verbal consent was obtained for injection of Ultrasonic                      Enhancing Agent following a discussion of risks and                      benefits.                      Endocardial visualization enhanced with 2 ml of Definity                      Ultrasound enhancing agent (Lot#:[id number]).  UEA Reaction:       Patient had no adverse reaction after injection of                      Ultrasound Enhancing Agent.    _______________________________________________________________________________________     CONCLUSIONS:     1. Left ventricular cavity is normal in size. Left ventricular wall thickness is normal. Left ventricular systolic function is severely decreased with an ejection fraction visually estimated at 30 to 35 %.   2. Pulmonary artery systolic pressure could not be estimated.   3. Trace pericardial effusion.      < end of copied text >          < from: MR Cardiac w/wo IV Cont (24 @ 16:00) >  ACC: [id number] EXAM:  MR CARD MORPH FUNCTION WAW IC   ORDERED BY:   DORYS GOMES     PROCEDURE DATE:  2024          INTERPRETATION:  HISTORY: 23-year-old woman. Cardiac arrest. Cardiogenic   shock. Echocardiogram performed 9/3/2024 demonstrated LVEF of 30-35%.    EXAMINATION: Cardiac MRI without and with Gadolinium.    TECHNIQUE: Cardiac MRI was performed before and after the administration   of intravenous gadolinium.    CONTRAST/COMPLICATIONS:  IV Contrast: Gadavist  9 cc administered   1 cc discarded  Oral Contrast: NONE  Complications: None reported at time of study completion      FINDINGS:    Chambers: The left ventricle is normal in size. There is no left   ventricular hypertrophy/thickening. Wall motion of the left ventricular   mid wall segments is hypokinetic. The wall motion of the left ventricular   basilar and apical wall segments appear preserved.    The left atrium is normal in size.    The right ventricle is normal in size. There is normal right ventricular   wall motion and function.    The right atrium is normal in size.    No immediate resting perfusion defects identified within the left   ventricular myocardium after administration of gadolinium. There is no   convincing evidence of late gadolinium enhancement of the left or right   ventricular myocardium.    LVEDV is 179 mL  LVESV is 85 mL  LVSV is 94 mL  LVEF is 53%    RVEDV is 150 mL  RVESV is 62 mL  RVSV is 88 mL  RVEF is 59%      Valves: No CMR evidence of valvular abnormality.    Vessels: Thoracic aorta is normal in caliber.    Thorax: Small pericardial effusion. Trace left pleural effusion. No large   mediastinal or axillary nodes.    Imaged Abdomen: Unremarkable.    IMPRESSION:.    No convincing evidence of myocardial scarring.    There is hypokinesis of the mid LV wall segments and preserved wall   motion of the LV basilar and apical wall segments.    The calculated left ventricular ejection fraction is 53%.    Small pericardial effusion.    --- End of Report ---      < end of copied text >        < from: CT Angio Cardiac w/ IV Cont (24 @ 09:18) >  ACC: [id number] EXAM:  CT ANGIO HEART CORONARY IC   ORDERED BY: LINDSAY GREEN     PROCEDURE DATE:  2024          INTERPRETATION:  .  Patient: NANY RODRIGES  : 2000  MRN: [id number]  ACC: [id number]  Order Date: 9/3/2024 9:18 AM  Exam: CT ANGIO HEART CORONARY    INDICATION: Cardiac arrest, syncope rule out anomalous coronary artery.  COMPARISON: CT chest 2024.  TECHNIQUE: Unenhanced prospective and enhanced retrospective images were   obtained of the heart using a Siemens Definition Edge scanner. Images   were obtained after the uneventful administration of nonionic intravenous   contrast.  CONTRAST VOLUME: Omnipaque 350. 80 cc administered. 20 cc discarded.  DOSE: 924 mGy.cm.  QUALITY:  Excellent.    FINDINGS:    CALCIUMSCORE: The calculated Agatston score is 0.    CORONARY: Co-coronary artery dominance. No evidence for anomalous   coronary arteries.    LEFT MAIN: Normal bifurcation into LAD, LCX.    LEFT ANTERIOR DESCENDING: There is one diagonal branch. Distal vessel   wraps around apex.    Proximal: Normal.  Mid: Normal.  Distal: Normal.    First diagonal: Normal.  Second diagonal: Normal.    LEFT CIRCUMFLEX: There is one obtuse marginal branch. Gives rise to the   PLV division    Proximal: Normal.  Mid/Distal: Normal.    First obtuse marginal: Normal.    PLV: Normal.    RIGHT CORONARY ARTERY: Gives rise to PDA.    Proximal: Normal.  Mid: Normal.  Distal: Normal.    Posterior descending: Small caliber vessel.    MORPHOLOGY: The LV is dilated.  Normal end diastolic left ventricular   wall thickness.    VALVES: The aortic valve is trileaflet. The anterior and posterior   leaflets of the mitral valve are normal..    PERICARDIUM: Normal pericardial contour. No pericardial effusion.    AORTA: Normal in diameter.    NONCARDIAC FINDINGS: Mild atelectasis within basilar left lower lobe.   Patchy opacities within the left lung are decreased.      IMPRESSION:    No anomalous coronary artery. No plaque or stenosis.    --- End of Report ---        < end of copied text >

## 2024-09-06 NOTE — PROGRESS NOTE ADULT - SUBJECTIVE AND OBJECTIVE BOX
NANY RODRIGES    [id number]    23y      Female    INTERVAL HPI/OVERNIGHT EVENTS:  patient being seen for cardiac arrest, brugada syndrome and med management. patient seen and denies any complaints      REVIEW OF SYSTEMS:    CONSTITUTIONAL: No fever, weight loss, or fatigue  RESPIRATORY: No cough, wheezing, hemoptysis; No shortness of breath  CARDIOVASCULAR: No chest pain, palpitations  GASTROINTESTINAL: No abdominal or epigastric pain. No nausea, vomiting  NEUROLOGICAL: No headaches, memory loss, loss of strength.  MISCELLANEOUS:      Vital Signs Last 24 Hrs  T(C): 36.5 (06 Sep 2024 09:00), Max: 36.6 (05 Sep 2024 22:16)  T(F): 97.7 (06 Sep 2024 09:00), Max: 97.9 (06 Sep 2024 06:00)  HR: 60 (06 Sep 2024 09:00) (60 - 76)  BP: 101/62 (06 Sep 2024 09:00) (100/63 - 135/77)  BP(mean): 84 (06 Sep 2024 06:00) (76 - 84)  RR: 18 (06 Sep 2024 09:00) (18 - 18)  SpO2: 98% (06 Sep 2024 09:00) (96% - 100%)    Parameters below as of 06 Sep 2024 09:00  Patient On (Oxygen Delivery Method): room air        PHYSICAL EXAM:    GENERAL: NAD, pleasant  HEENT: PERRL, +EOMI  NECK: soft, Supple, No JVD,   CHEST/LUNG: Clear to auscultation bilaterally; No wheezing  HEART: S1S2+, bradycardia  ABDOMEN: Soft, Nontender, Nondistended; Bowel sounds present  EXTREMITIES:  2+ Peripheral Pulses, No clubbing, cyanosis, or edema  SKIN: No rashes or lesions  NEURO: AAOX3, no focal deficits, no motor r sensory loss  PSYCH: normal mood      LABS:    09-06    140  |  103  |  25.9<H>  ----------------------------<  81  4.1   |  26.0  |  0.73    Ca    8.9      06 Sep 2024 04:38  Mg     2.0     09-06    TPro  6.1<L>  /  Alb  3.3  /  TBili  0.2<L>  /  DBili  x   /  AST  117<H>  /  ALT  167<H>  /  AlkPhos  56  09-06      Urinalysis Basic - ( 06 Sep 2024 04:38 )    Color: x / Appearance: x / SG: x / pH: x  Gluc: 81 mg/dL / Ketone: x  / Bili: x / Urobili: x   Blood: x / Protein: x / Nitrite: x   Leuk Esterase: x / RBC: x / WBC x   Sq Epi: x / Non Sq Epi: x / Bacteria: x          MEDICATIONS  (STANDING):  chlorhexidine 2% Cloths 1 Application(s) Topical daily  heparin   Injectable 5000 Unit(s) SubCutaneous every 12 hours  lisinopril 2.5 milliGRAM(s) Oral daily  polyethylene glycol 3350 17 Gram(s) Oral daily  senna 2 Tablet(s) Oral at bedtime    MEDICATIONS  (PRN):  aluminum hydroxide/magnesium hydroxide/simethicone Suspension 30 milliLiter(s) Oral every 4 hours PRN Dyspepsia  sodium chloride 0.9% lock flush 10 milliLiter(s) IV Push every 1 hour PRN Pre/post blood products, medications, blood draw, and to maintain line patency      RADIOLOGY & ADDITIONAL TESTS:

## 2024-09-06 NOTE — PROGRESS NOTE ADULT - ASSESSMENT
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

## 2024-09-06 NOTE — CONSULT NOTE ADULT - SUBJECTIVE AND OBJECTIVE BOX
[facility], [facility]                              [address] [address]                                                 PHONE: [phone]                                                 FAX: [phone]  ------------------------------------------------------------------------------------------------    Chief complaint: cardiac arrest    23y Female with past medical history as under was running with her boyfriend, on mile 8 patient sat down on a bench and had episode of syncope. History obtained from ER, ICU team and family including sister. Patients boyfriend states patient was unresponsive, tried leg lift with no improvement, states unsure if patient had a pulse. Called EMS, who arrived to the scene 5 mins, boyfriend did not provide CPR while awaiting EMS. Patient was found PEA, 3 rounds of CPR, 1 epi in the field, found to be in VTach and defibrillated. Upon arrival to ED, sinus bradycardia in 30s, atropine was given with improvement in HR, patient intubated for airway protection.  At the time of evaluation, pt remains intubated. She has had prior episodes of near syncope. She is a non smoker. No FH of SCD. Brother has bicuspid aortic valve.    RELEVANT PHYSICAL EXAM:  Neck: No obvious JVD  Cardiovascular: regular S1, S2  Respiratory: Lungs clear to auscultation; no crepitations, no wheeze  Musculoskeletal: No edema    Vital Signs Last 24 Hrs  T(C): 37.2 (29 Aug 2024 14:15), Max: 37.2 (29 Aug 2024 14:15)  T(F): 99 (29 Aug 2024 14:15), Max: 99 (29 Aug 2024 14:15)  HR: 78 (29 Aug 2024 15:45) (39 - 91)  BP: 100/62 (29 Aug 2024 15:45) (81/45 - 142/95)  BP(mean): 75 (29 Aug 2024 15:45) (74 - 102)  RR: 17 (29 Aug 2024 15:45) (16 - 20)  SpO2: 95% (29 Aug 2024 15:45) (95% - 100%)    Parameters below as of 29 Aug 2024 16:00  Patient On (Oxygen Delivery Method): ventilator    O2 Concentration (%): 40    PAST MEDICAL & SURGICAL HISTORY:  Syncopal episodes          Allergy Status Unknown      LABS:                        14.4   11.34 )-----------( 257      ( 29 Aug 2024 12:51 )             42.9     08-29    140  |  103  |  25.4<H>  ----------------------------<  176<H>  4.2   |  18.0<L>  |  1.05    Ca    9.2      29 Aug 2024 12:51  Phos  5.5     08-29  Mg     1.8     08-29    TPro  7.0  /  Alb  4.0  /  TBili  0.4  /  DBili  x   /  AST  81<H>  /  ALT  63<H>  /  AlkPhos  81  08-29        PT/INR - ( 29 Aug 2024 12:51 )   PT: 11.2 sec;   INR: 1.01 ratio         PTT - ( 29 Aug 2024 12:51 )  PTT:28.7 sec  RADIOLOGY & ADDITIONAL STUDIES: (reviewed)  CXR was independently visualized/reviewed and demonstrated:     CARDIOLOGY TESTING:(reviewed)     Prior ECG (Independent visualization): NSR with IRBBB    ECG in ED with junctional rhythm with retrograde p wave    Echocardiogram (11/21/22): revealed structurally normal heart with preserved left ventricular function without any significant valvular abnormalities.      Exercise stress test (12/16/22): patient exercised for 13:17 minutes achieving 17.20 METS and 85% MHR without any EKG changes or symptoms.      Holter monitor (11/21/22): Predominately sinus sonia without any arrhythmia averaging 56 BPM    TELEMETRY independently visualized/reviewed and demonstrated : NSR    She is status post tilt table test 2/17/23 which was negative.     MEDICATIONS:(reviewed)  Home Medications:  MEDICATIONS  (STANDING):  artificial  tears Solution 1 Drop(s) Both EYES every 12 hours  chlorhexidine 0.12% Liquid 15 milliLiter(s) Oral Mucosa every 12 hours  dextrose 5% + sodium chloride 0.45%. 1000 milliLiter(s) (100 mL/Hr) IV Continuous <Continuous>  fentaNYL   Infusion. 0.5 MICROgram(s)/kG/Hr (2.95 mL/Hr) IV Continuous <Continuous>  heparin   Injectable 5000 Unit(s) SubCutaneous every 12 hours  norepinephrine Infusion 0.05 MICROgram(s)/kG/Min (5.53 mL/Hr) IV Continuous <Continuous>  pantoprazole  Injectable 40 milliGRAM(s) IV Push daily  piperacillin/tazobactam IVPB. 3.375 Gram(s) IV Intermittent once  piperacillin/tazobactam IVPB.. 3.375 Gram(s) IV Intermittent every 8 hours  propofol Infusion 20 MICROgram(s)/kG/Min (7.08 mL/Hr) IV Continuous <Continuous>    MEDICATIONS  (PRN):    ASSESSMENT AND PLAN:    23y Female with prior h/o near syncope and 1 episode of syncope s/p cardiac arrest while running. Patient was found PEA, 3 rounds of CPR, 1 epi in the field, found to be in VTach and defibrillated. Upon arrival to ED, sinus bradycardia in 30s, atropine was given with improvement in HR, patient intubated for airway protection. ECG in ED with junctional rhythm with retrograde p wave. Now on low dose nor epi and fentanyl for sedation    Cardiac arrest  - ICU monitoring with supportive care  - Will need workup pending neurologic recovery  - Echo to assess wall motion  - Will consider cath vs. coronary CTA to r/o anomalous coronary arteries  - Cardiac MRI to r/o ARVD  - Eventual ICD for secondary prevention. Will have EP evaluate    Plan d/w ICU team    Critical Care time spent with the patient and the family, reviewing laboratory results and radiological studies, reviewing the chart, discussing with ICU and consultants, making decisions and excluding procedures: 45 minutes           
                                 [facility]                                        [facility] [place]                                  Hua Whitlock, & Jasen                                      [address]                                           [place], [place], [address]                                                [phone]        CC: Anoxic encephalopathy and memory difficulty.     HISTORY:  The patient is a 23y Female who on 8/29was running with her boyfriend when she collapsed and was unresponsive. EMS was called and she was found in PEA arrest.   She received 3 rounds of CPR, 1 epi in the field, found to be in V. Tach and was defibrillated.   Upon arrival to ED, sinus bradycardia in 30s, atropine was given with improvement in heart rate.  She was intubated and admitted to ICU.   She has been followed by cardiology.   Initial EEG showed severe slowing which improved to mild slowing by the time EEG was discontinued.   She was extubated on 8/30.  At this point she is much improved, but still has memory difficulty with regard to the day of the events.     PAST MEDICAL & SURGICAL HISTORY:  Syncopal episodes    MEDICATION PRIOR TO ADMISSION:  None.     MEDICATIONS  (STANDING):  chlorhexidine 2% Cloths 1 Application(s) Topical daily  heparin   Injectable 5000 Unit(s) SubCutaneous every 12 hours  methylPREDNISolone sodium succinate Injectable 40 milliGRAM(s) IV Push every 12 hours  polyethylene glycol 3350 17 Gram(s) Oral daily  senna 2 Tablet(s) Oral at bedtime    MEDICATIONS  (PRN):  aluminum hydroxide/magnesium hydroxide/simethicone Suspension 30 milliLiter(s) Oral every 4 hours PRN Dyspepsia  sodium chloride 0.9% lock flush 10 milliLiter(s) IV Push every 1 hour PRN Pre/post blood products, medications, blood draw, and to maintain line patency    Allergies  Amoxicillin.     SOCIAL HISTORY:  Non smoker.     FAMILY HISTORY:  No known family history of stroke or seizure.     ROS:  Constitutional: The patient denies fevers or weight changes.  Neuro: As per HPI.  Eyes: Denies blurry vision.  Ears/nose/throat: Denies Tinnitus.   Cardiac: Denies chest pain. Denies palpitations.  Respiratory: Denies shortness of breath.  GI: Denies abdominal pain, nausea, or vomiting.  : Denies change in urinary pattern.  Integumentary: Denies rash.  Psych: Denies recent mood changes.  Heme: denies easy bleeding/bruising.    Exam:  Vital Signs Last 24 Hrs  T(C): 36.9 (03 Sep 2024 10:59), Max: 36.9 (02 Sep 2024 23:34)  T(F): 98.4 (03 Sep 2024 10:59), Max: 98.4 (02 Sep 2024 23:34)  HR: 55 (03 Sep 2024 11:00) (40 - 91)  BP: 107/66 (03 Sep 2024 11:00) (87/53 - 113/61)  BP(mean): 77 (03 Sep 2024 11:00) (65 - 83)  RR: 19 (03 Sep 2024 11:00) (9 - 26)  SpO2: 100% (03 Sep 2024 11:00) (96% - 100%)    Parameters below as of 03 Sep 2024 08:00  Patient On (Oxygen Delivery Method): room air    General: NAD.   Carotid bruits absent.     Mental status: The patient is awake, alert, and fully oriented. There is no aphasia. Memory improving.     Cranial nerves: There is no papilledema. Pupils react symmetrically to light. There is no visual field deficit to confrontation. Extraocular motion is full with no nystagmus.  Facial sensation is intact. Facial musculature is symmetric. Palate elevates symmetrically. Tongue is midline.    Motor: There is normal bulk and tone.  Strength is 5/5 in the right arm and leg.   Strength is 5/5 in the left arm and leg.    Sensation: Intact to light touch and pin. There is no extinction to double simultaneous stimulation.    Reflexes: 2+ throughout and plantar responses are flexor.    Cerebellar: There is no dysmetria on finger to nose testing.    LABS:                         12.1   9.25  )-----------( 216      ( 03 Sep 2024 04:33 )             36.7       09-03    138  |  103  |  16.4  ----------------------------<  107<H>  4.9   |  25.0  |  0.45<L>    Ca    8.6      03 Sep 2024 04:33  Phos  3.5     09-03  Mg     2.0     09-03    TPro  6.3<L>  /  Alb  3.4  /  TBili  0.4  /  DBili  x   /  AST  59<H>  /  ALT  46<H>  /  AlkPhos  55  09-03      PT/INR - ( 02 Sep 2024 04:05 )   PT: 12.3 sec;   INR: 1.11 ratio        RADIOLOGY   CT head 8/29 images reviewed (and concur with report): There is no acute pathology.     EEG as above. No seizure activity.    
23y Female with prior h/o near syncope and 1 episode of syncope s/p cardiac arrest while running. Patient was found PEA, 3 rounds of CPR, 1 epi in the field, found to be in VTach and defibrillated. Upon arrival to ED, sinus bradycardia in 30s, atropine was given with improvement in HR, patient intubated for airway protection. Now extubated. Upon re-evaluation of prior exercise stress test, type 1 brugada was found. Patient now planned for secondary prevention EV-AICD. CT surgery consulted for assistance with EV-AICD placement.     Subjective - patient seen and evaluated bedside. Sitting comfortably in bed. Denies CP, SOB, HA, dizziness, n/v/d    Review of Systems: negative x 10 systems except as noted above      PAST MEDICAL & SURGICAL HISTORY:  Syncopal episodes            aluminum hydroxide/magnesium hydroxide/simethicone Suspension 30 milliLiter(s) Oral every 4 hours PRN  chlorhexidine 2% Cloths 1 Application(s) Topical daily  heparin   Injectable 5000 Unit(s) SubCutaneous every 12 hours  lisinopril 2.5 milliGRAM(s) Oral daily  polyethylene glycol 3350 17 Gram(s) Oral daily  senna 2 Tablet(s) Oral at bedtime  sodium chloride 0.9% lock flush 10 milliLiter(s) IV Push every 1 hour PRN  MEDICATIONS  (PRN):  aluminum hydroxide/magnesium hydroxide/simethicone Suspension 30 milliLiter(s) Oral every 4 hours PRN Dyspepsia  sodium chloride 0.9% lock flush 10 milliLiter(s) IV Push every 1 hour PRN Pre/post blood products, medications, blood draw, and to maintain line patency      Daily     Daily         09-06    140  |  103  |  25.9<H>  ----------------------------<  81  4.1   |  26.0  |  0.73    Ca    8.9      06 Sep 2024 04:38  Mg     2.0     09-06    TPro  6.1<L>  /  Alb  3.3  /  TBili  0.2<L>  /  DBili  x   /  AST  117<H>  /  ALT  167<H>  /  AlkPhos  56  09-06            Objective:  T(C): 36.5 (09-06-24 @ 09:00), Max: 36.6 (09-05-24 @ 22:16)  HR: 60 (09-06-24 @ 09:00) (54 - 76)  BP: 101/62 (09-06-24 @ 09:00) (96/59 - 135/77)  RR: 18 (09-06-24 @ 09:00) (18 - 18)  SpO2: 98% (09-06-24 @ 09:00) (96% - 100%)  Wt(kg): --CAPILLARY BLOOD GLUCOSE      I&O's Summary      Physical Exam  General: NAD  Neuro: A+O x 3, non-focal, speech clear and intact  Psych: Appropriate affect  HEENT:  NCAT, No conjuctival edema or icterus, no thrush.  Pulm: CTA, equal bilaterally  CV: RRR, +S1S2  Ext:  no edema  Skin: Warm, dry, intact          Imaging:      < from: TTE W or WO Ultrasound Enhancing Agent (09.03.24 @ 11:23) >  CONCLUSIONS:     1. Left ventricular cavity is normal in size. Left ventricular wall thickness is normal. Left ventricular systolic function is severely decreased with an ejection fraction visually estimated at 30 to 35 %.   2. Pulmonary artery systolic pressure could not be estimated.   3. Trace pericardial effusion.    < end of copied text >            
JACQUESALIZANANY BUNDY  23y  Female      Patient is a 23y old  Female who presents with a chief complaint of Cardiac Arrest (03 Sep 2024 11:05)      22 y/o F with a h/o multiple syncopal episodes getting workup outpatient admitted for PEA/VT cardiac arrest, s/p ROSC after 3 rounds of CPR, intubated and admitted to the ICU s/p with cardiogenic shock and acute systolic heart failure. She was started on dobutamine and pressors, now both weaned off. TTE with reduced EF 25-30%.   Course further c/b acute resp failure wiht hypoxia suspect 2/2 asp and cardaic arrest, on Zosyn. Now extubated and stable on RA, transferred to medicine.     PAST MEDICAL/SURGICAL HISTORY  PAST MEDICAL & SURGICAL HISTORY:  Syncopal episodes          REVIEW OF SYSTEMS:  CONSTITUTIONAL: No fever, weight loss, or fatigue  EYES: No eye pain, visual disturbances, or discharge  ENMT:  No difficulty hearing, tinnitus, vertigo; No sinus or throat pain  NECK: No pain or stiffness  BREASTS: No pain, masses, or nipple discharge  RESPIRATORY: No cough, wheezing, chills or hemoptysis; No shortness of breath  CARDIOVASCULAR: No chest pain, palpitations, dizziness, or leg swelling  GASTROINTESTINAL: No abdominal or epigastric pain. No nausea, vomiting, or hematemesis; No diarrhea or constipation. No melena or hematochezia.  GENITOURINARY: No dysuria, frequency, hematuria, or incontinence  NEUROLOGICAL: No headaches, memory loss, loss of strength, numbness, or tremors  SKIN: No itching, burning, rashes, or lesions   LYMPH NODES: No enlarged glands  ENDOCRINE: No heat or cold intolerance; No hair loss  MUSCULOSKELETAL: No joint pain or swelling; No muscle, back, or extremity pain  PSYCHIATRIC: No depression, anxiety, mood swings, or difficulty sleeping  HEME/LYMPH: No easy bruising, or bleeding gums  ALLERY AND IMMUNOLOGIC: No hives or eczema    T(C): 36.9 (09-03-24 @ 10:59), Max: 36.9 (09-02-24 @ 23:34)  HR: 55 (09-03-24 @ 11:00) (40 - 91)  BP: 107/66 (09-03-24 @ 11:00) (87/53 - 113/61)  RR: 19 (09-03-24 @ 11:00) (9 - 26)  SpO2: 100% (09-03-24 @ 11:00) (96% - 100%)  Wt(kg): --Vital Signs Last 24 Hrs  T(C): 36.9 (03 Sep 2024 10:59), Max: 36.9 (02 Sep 2024 23:34)  T(F): 98.4 (03 Sep 2024 10:59), Max: 98.4 (02 Sep 2024 23:34)  HR: 55 (03 Sep 2024 11:00) (40 - 91)  BP: 107/66 (03 Sep 2024 11:00) (87/53 - 113/61)  BP(mean): 77 (03 Sep 2024 11:00) (65 - 83)  RR: 19 (03 Sep 2024 11:00) (9 - 26)  SpO2: 100% (03 Sep 2024 11:00) (96% - 100%)    Parameters below as of 03 Sep 2024 12:00  Patient On (Oxygen Delivery Method): room air        PHYSICAL EXAM:  GENERAL: NAD, well-groomed  HEAD:  Atraumatic, Normocephalic  EYES: EOMI, PERRLA, conjunctiva and sclera clear  ENMT: No tonsillar erythema, exudates, or enlargement; Moist mucous membranes, Good dentition, No lesions  NECK: Supple, No JVD, Normal thyroid  NERVOUS SYSTEM:  Alert & Oriented X3, Good concentration; Motor Strength 5/5 B/L upper and lower extremities; DTRs 2+ intact and symmetric  CHEST/LUNG: Clear to percussion bilaterally; No rales, rhonchi, wheezing, or rubs  HEART: Regular rate and rhythm; No murmurs, rubs, or gallops  ABDOMEN: Soft, Nontender, Nondistended; Bowel sounds present  EXTREMITIES:  2+ Peripheral Pulses, No clubbing, cyanosis, or edema  LYMPH: No lymphadenopathy noted  SKIN: No rashes or lesions    Consultant(s) Notes Reviewed:  [x ] YES  [ ] NO  Care Discussed with Consultants/Other Providers [ x] YES  [ ] NO    LABS:  CBC   09-03-24 @ 04:33  Hematcorit 36.7  Hemoglobin 12.1  Mean Cell Hemoglobin 31.3  Platelet Count-Automated 216  RBC Count 3.86  Red Cell Distrib Width 13.0  Wbc Count 9.25      BMP  09-03-24 @ 04:33  Anion Gap. Serum 10  Blood Urea Nitrogen,Serm 16.4  Calcium, Total Serum 8.6  Carbon Dioxide, Serum 25.0  Chloride, Serum 103  Creatinine, Serum 0.45  eGFR in  --  eGFR in Non Afican American --  Gloucose, serum 107  Potassium, Serum 4.9  Sodium, Serum 138      09-02-24 @ 04:05  Anion Gap. Serum 10  Blood Urea Nitrogen,Serm 11.3  Calcium, Total Serum 8.6  Carbon Dioxide, Serum 24.0  Chloride, Serum 103  Creatinine, Serum 0.41  eGFR in  --  eGFR in Non Afican American --  Gloucose, serum 123  Potassium, Serum 3.8  Sodium, Serum 137      09-01-24 @ 21:25  Anion Gap. Serum 10  Blood Urea Nitrogen,Serm 10.2  Calcium, Total Serum 8.4  Carbon Dioxide, Serum 23.0  Chloride, Serum 103  Creatinine, Serum 0.46  eGFR in  --  eGFR in Non Afican American --  Gloucose, serum 137  Potassium, Serum 4.1  Sodium, Serum 136      09-01-24 @ 11:59  Anion Gap. Serum 9  Blood Urea Nitrogen,Serm 9.3  Calcium, Total Serum 8.4  Carbon Dioxide, Serum 25.0  Chloride, Serum 105  Creatinine, Serum 0.42  eGFR in  --  eGFR in Non Afican American --  Gloucose, serum 163  Potassium, Serum 4.1  Sodium, Serum 139      09-01-24 @ 06:17  Anion Gap. Serum 11  Blood Urea Nitrogen,Serm 6.8  Calcium, Total Serum 8.3  Carbon Dioxide, Serum 26.0  Chloride, Serum 105  Creatinine, Serum 0.46  eGFR in  --  eGFR in Non Afican American --  Gloucose, serum 133  Potassium, Serum 3.9  Sodium, Serum 142      09-01-24 @ 01:58  Anion Gap. Serum 10  Blood Urea Nitrogen,Serm 6.0  Calcium, Total Serum 8.1  Carbon Dioxide, Serum 26.0  Chloride, Serum 102  Creatinine, Serum 0.42  eGFR in  --  eGFR in Non Afican American --  Gloucose, serum 148  Potassium, Serum 3.9  Sodium, Serum 138      08-31-24 @ 16:50  Anion Gap. Serum 12  Blood Urea Nitrogen,Serm 6.3  Calcium, Total Serum 8.4  Carbon Dioxide, Serum 22.0  Chloride, Serum 105  Creatinine, Serum 0.39  eGFR in  --  eGFR in Non Afican American --  Gloucose, serum 154  Potassium, Serum 3.7  Sodium, Serum 139      CMP  09-03-24 @ 04:33  Renetta Aminotransferase(ALT/SGPT)46  Albumin, Serum 3.4  Alkaline Phosphatase, Serum 55  Anion Gap, Serum 10  Aspartate Aminotransferase (AST/SGOT)59  Bilirubin Total, Serum 0.4  Blood Urea Nitrogen, Serum 16.4  Calcium,Total Serum 8.6  Carbon Dioxide, Serum 25.0  Chloride, Serum 103  Creatinine, Serum 0.45  eGFR if  --  eGFR if Non African American --  Glucose, Serum 107  Potassium, Serum 4.9  Protein Total, Serum 6.3  Sodium, Serum 138      09-02-24 @ 04:05  Renetta Aminotransferase(ALT/SGPT)48  Albumin, Serum 3.4  Alkaline Phosphatase, Serum 55  Anion Gap, Serum 10  Aspartate Aminotransferase (AST/SGOT)63  Bilirubin Total, Serum 0.6  Blood Urea Nitrogen, Serum 11.3  Calcium,Total Serum 8.6  Carbon Dioxide, Serum 24.0  Chloride, Serum 103  Creatinine, Serum 0.41  eGFR if  --  eGFR if Non African American --  Glucose, Serum 123  Potassium, Serum 3.8  Protein Total, Serum 6.0  Sodium, Serum 137      09-01-24 @ 21:25  Renetta Aminotransferase(ALT/SGPT)52  Albumin, Serum 3.3  Alkaline Phosphatase, Serum 59  Anion Gap, Serum 10  Aspartate Aminotransferase (AST/SGOT)78  Bilirubin Total, Serum 0.6  Blood Urea Nitrogen, Serum 10.2  Calcium,Total Serum 8.4  Carbon Dioxide, Serum 23.0  Chloride, Serum 103  Creatinine, Serum 0.46  eGFR if  --  eGFR if Non African American --  Glucose, Serum 137  Potassium, Serum 4.1  Protein Total, Serum 6.1  Sodium, Serum 136      09-01-24 @ 11:59  Renetta Aminotransferase(ALT/SGPT)56  Albumin, Serum 3.3  Alkaline Phosphatase, Serum 60  Anion Gap, Serum 9  Aspartate Aminotransferase (AST/SGOT)92  Bilirubin Total, Serum 0.6  Blood Urea Nitrogen, Serum 9.3  Calcium,Total Serum 8.4  Carbon Dioxide, Serum 25.0  Chloride, Serum 105  Creatinine, Serum 0.42  eGFR if  --  eGFR if Non African American --  Glucose, Serum 163  Potassium, Serum 4.1  Protein Total, Serum 6.3  Sodium, Serum 139      09-01-24 @ 06:17  Renetta Aminotransferase(ALT/SGPT)56  Albumin, Serum 3.3  Alkaline Phosphatase, Serum 61  Anion Gap, Serum 11  Aspartate Aminotransferase (AST/SGOT)98  Bilirubin Total, Serum 0.7  Blood Urea Nitrogen, Serum 6.8  Calcium,Total Serum 8.3  Carbon Dioxide, Serum 26.0  Chloride, Serum 105  Creatinine, Serum 0.46  eGFR if  --  eGFR if Non African American --  Glucose, Serum 133  Potassium, Serum 3.9  Protein Total, Serum 6.1  Sodium, Serum 142      09-01-24 @ 01:58  Renetta Aminotransferase(ALT/SGPT)56  Albumin, Serum 3.3  Alkaline Phosphatase, Serum 54  Anion Gap, Serum 10  Aspartate Aminotransferase (AST/SGOT)109  Bilirubin Total, Serum 0.7  Blood Urea Nitrogen, Serum 6.0  Calcium,Total Serum 8.1  Carbon Dioxide, Serum 26.0  Chloride, Serum 102  Creatinine, Serum 0.42  eGFR if  --  eGFR if Non African American --  Glucose, Serum 148  Potassium, Serum 3.9  Protein Total, Serum 6.0  Sodium, Serum 138      08-31-24 @ 16:50  Renetta Aminotransferase(ALT/SGPT)60  Albumin, Serum 3.2  Alkaline Phosphatase, Serum 62  Anion Gap, Serum 12  Aspartate Aminotransferase (AST/SGOT)137  Bilirubin Total, Serum 0.7  Blood Urea Nitrogen, Serum 6.3  Calcium,Total Serum 8.4  Carbon Dioxide, Serum 22.0  Chloride, Serum 105  Creatinine, Serum 0.39  eGFR if  --  eGFR if Non African American --  Glucose, Serum 154  Potassium, Serum 3.7  Protein Total, Serum 5.9  Sodium, Serum 139        PT/INR  PT/INR  09-02-24 @ 04:05  INR 1.11  Prothrombin Time Comment --  Prothrobin Time, [id number]      Amylase/Lipase            RADIOLOGY & ADDITIONAL TESTS:    Imaging Personally Reviewed:  [ ] YES  [ ] NO

## 2024-09-06 NOTE — CONSULT NOTE ADULT - PROBLEM SELECTOR RECOMMENDATION 9
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 Walsh

## 2024-09-06 NOTE — CONSULT NOTE ADULT - ASSESSMENT
The patient is a 23y Female who is status post cardiac arrest now with some cognitive difficulty particularly for the events surrounding the arrest.    Memory difficulty   Gradually improving.   I explained that it is not uncommon for memory to remain incomplete on a long term basis for the events surrounding this type of episode.    Given the remarkable rate of improvement thus far, I would anticipate she will continue to improve slowly although I have explained that there is no way to predict how quickly or completely.  I would recommend neuropsychology referral at some point (may be done as outpatient) with Dr Alexus Terry for more thorough evaluation.     Cardiac arrest.   Of more concern at present is why she had a cardiac arrest to begin with.  Cardiology is following her and further work up is in progress.     Case discussed with ICU team (Dr LEAH Uribe attending).      
23y Female with prior h/o near syncope and 1 episode of syncope s/p cardiac arrest while running. Patient was found PEA, 3 rounds of CPR, 1 epi in the field, found to be in VTach and defibrillated. Upon arrival to ED, sinus bradycardia in 30s, atropine was given with improvement in HR, patient intubated for airway protection. Now extubated. Upon re-evaluation of prior exercise stress test, type 1 brugada was found. Patient now planned for secondary prevention EV-AICD. CT surgery consulted for assistance with EV-AICD placement. 
24 y/o F with a h/o multiple syncopal episodes getting workup outpatient admitted for PEA/VT cardiac arrest, course c/b asp PNA.     # PEA/VT cardiac arrest  # Cardiogenic shock s/p dobutamine drip  # Acute systolic heart failure  s/p rosc, now mental status near baseline with some amnesia   s/p dobutamine drip   CTH without acute findings   TTE result noted with EF 25-30%  Trop peaked  Cardiac MRI pending  GDMT if persistent cardiomyopathy noted on cMRI  ICD for secondary prevention on discharge  Genetic testing as outpt  Caty ventura following, ulices recs    # Acute hypoxemic respiratory failure  # Aspiration pneumonia  CT noted with asp pneumonitis   extubated 8/30 and now on RA  on Zosyn Day 6/7    #Elevated LFTs  2/2 cardiac arrest  improved on repeat CMP    DVT ppx: HSQ  Diet: DASH/TLC  Dispo: pending cardiac MRI and cards recs

## 2024-09-07 LAB
ALBUMIN SERPL ELPH-MCNC: 3.4 G/DL — SIGNIFICANT CHANGE UP (ref 3.3–5.2)
ALP SERPL-CCNC: 60 U/L — SIGNIFICANT CHANGE UP (ref 40–120)
ALT FLD-CCNC: 305 U/L — HIGH
ANION GAP SERPL CALC-SCNC: 11 MMOL/L — SIGNIFICANT CHANGE UP (ref 5–17)
AST SERPL-CCNC: 233 U/L — HIGH
BILIRUB SERPL-MCNC: 0.3 MG/DL — LOW (ref 0.4–2)
BUN SERPL-MCNC: 22.2 MG/DL — HIGH (ref 8–20)
CALCIUM SERPL-MCNC: 9 MG/DL — SIGNIFICANT CHANGE UP (ref 8.4–10.5)
CHLORIDE SERPL-SCNC: 104 MMOL/L — SIGNIFICANT CHANGE UP (ref 96–108)
CO2 SERPL-SCNC: 24 MMOL/L — SIGNIFICANT CHANGE UP (ref 22–29)
CREAT SERPL-MCNC: 0.62 MG/DL — SIGNIFICANT CHANGE UP (ref 0.5–1.3)
EGFR: 129 ML/MIN/1.73M2 — SIGNIFICANT CHANGE UP
GLUCOSE SERPL-MCNC: 90 MG/DL — SIGNIFICANT CHANGE UP (ref 70–99)
MAGNESIUM SERPL-MCNC: 2.1 MG/DL — SIGNIFICANT CHANGE UP (ref 1.6–2.6)
POTASSIUM SERPL-MCNC: 4.5 MMOL/L — SIGNIFICANT CHANGE UP (ref 3.5–5.3)
POTASSIUM SERPL-SCNC: 4.5 MMOL/L — SIGNIFICANT CHANGE UP (ref 3.5–5.3)
PROT SERPL-MCNC: 5.8 G/DL — LOW (ref 6.6–8.7)
SODIUM SERPL-SCNC: 139 MMOL/L — SIGNIFICANT CHANGE UP (ref 135–145)

## 2024-09-07 PROCEDURE — 99232 SBSQ HOSP IP/OBS MODERATE 35: CPT

## 2024-09-07 RX ADMIN — LISINOPRIL 2.5 MILLIGRAM(S): 10 TABLET ORAL at 11:52

## 2024-09-07 RX ADMIN — Medication 5000 UNIT(S): at 17:47

## 2024-09-07 NOTE — PROGRESS NOTE ADULT - SUBJECTIVE AND OBJECTIVE BOX
[facility] - [facility], THE HEART CENTER                                   [address] [place] [address]                                                      PHONE: [phone]                                                         FAX: [phone]  [email or web address]  ---------------------------------------------------------------------------------------------------------------------------------    Overnight events/patient complaints: feels well.  Ambulating without problems.  No CP, palpitations or lightheadedness.  Tele- sinus rhythm, no VT      Allergy Status Unknown    MEDICATIONS  (STANDING):  chlorhexidine 2% Cloths 1 Application(s) Topical daily  heparin   Injectable 5000 Unit(s) SubCutaneous every 12 hours  lisinopril 2.5 milliGRAM(s) Oral daily  polyethylene glycol 3350 17 Gram(s) Oral daily  senna 2 Tablet(s) Oral at bedtime    MEDICATIONS  (PRN):  aluminum hydroxide/magnesium hydroxide/simethicone Suspension 30 milliLiter(s) Oral every 4 hours PRN Dyspepsia  sodium chloride 0.9% lock flush 10 milliLiter(s) IV Push every 1 hour PRN Pre/post blood products, medications, blood draw, and to maintain line patency      Vital Signs Last 24 Hrs  T(C): 37 (07 Sep 2024 07:51), Max: 37 (07 Sep 2024 07:51)  T(F): 98.6 (07 Sep 2024 07:51), Max: 98.6 (07 Sep 2024 07:51)  HR: 56 (07 Sep 2024 07:51) (48 - 64)  BP: 103/69 (07 Sep 2024 07:51) (92/52 - 106/66)  BP(mean): 74 (06 Sep 2024 21:41) (74 - 74)  RR: 18 (07 Sep 2024 07:51) (16 - 18)  SpO2: 98% (07 Sep 2024 07:51) (96% - 99%)    Parameters below as of 07 Sep 2024 07:51  Patient On (Oxygen Delivery Method): room air      ICU Vital Signs Last 24 Hrs  NANY RODRIGES  I&O's Detail    06 Sep 2024 07:01  -  07 Sep 2024 07:00  --------------------------------------------------------  IN:    Oral Fluid: 720 mL  Total IN: 720 mL    OUT:    Voided (mL): 400 mL  Total OUT: 400 mL    Total NET: 320 mL        I&O's Summary    06 Sep 2024 07:01  -  07 Sep 2024 07:00  --------------------------------------------------------  IN: 720 mL / OUT: 400 mL / NET: 320 mL      Drug Dosing Weight  NANY RODRIGES      PHYSICAL EXAM:  General: alert and cooperative.  HEENT: Head; normocephalic, atraumatic.  Eyes: Pupils reactive, cornea wnl.  Neck: Supple, no nodes adenopathy, no NVD or carotid bruit or thyromegaly.  CARDIOVASCULAR: Normal S1 and S2, No murmur, rub, gallop or lift.   LUNGS: No rales, rhonchi or wheeze. Normal breath sounds bilaterally.  ABDOMEN: Soft, nontender without mass or organomegaly. bowel sounds normoactive.  EXTREMITIES: No clubbing, cyanosis or edema. Distal pulses wnl.   SKIN: warm and dry with normal turgor.  NEURO: Alert/oriented x 3/normal motor exam. No pathologic reflexes.    PSYCH: normal affect.        LABS:    09-07    139  |  104  |  22.2<H>  ----------------------------<  90  4.5   |  24.0  |  0.62    Ca    9.0      07 Sep 2024 04:55  Mg     2.1     09-07    TPro  5.8<L>  /  Alb  3.4  /  TBili  0.3<L>  /  DBili  x   /  AST  233<H>  /  ALT  305<H>  /  AlkPhos  60  09-07    ANNY RODRIGES        Urinalysis Basic - ( 07 Sep 2024 04:55 )    Color: x / Appearance: x / SG: x / pH: x  Gluc: 90 mg/dL / Ketone: x  / Bili: x / Urobili: x   Blood: x / Protein: x / Nitrite: x   Leuk Esterase: x / RBC: x / WBC x   Sq Epi: x / Non Sq Epi: x / Bacteria: x        RADIOLOGY & ADDITIONAL STUDIES:      ASSESSMENT AND PLAN:  s/p VF arrest  cardiac MRI normal  no CAD or anomalous coronary anatomy  ?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

## 2024-09-07 NOTE — PROGRESS NOTE ADULT - SUBJECTIVE AND OBJECTIVE BOX
Dc Weinberg MD  [facility]  Contact via Teams or text/call at [phone]    Patient is a 23y old  Female who presents with a chief complaint of Cardiac Arrest (06 Sep 2024 13:45)    Feels okay.  Mild soreness at sternal but improved.      Patient seen and examined at bedside. No overnight events reported.     ALLERGIES:  Allergy Status Unknown    MEDICATIONS  (STANDING):  chlorhexidine 2% Cloths 1 Application(s) Topical daily  heparin   Injectable 5000 Unit(s) SubCutaneous every 12 hours  lisinopril 2.5 milliGRAM(s) Oral daily  polyethylene glycol 3350 17 Gram(s) Oral daily  senna 2 Tablet(s) Oral at bedtime    MEDICATIONS  (PRN):  aluminum hydroxide/magnesium hydroxide/simethicone Suspension 30 milliLiter(s) Oral every 4 hours PRN Dyspepsia  sodium chloride 0.9% lock flush 10 milliLiter(s) IV Push every 1 hour PRN Pre/post blood products, medications, blood draw, and to maintain line patency    Vital Signs Last 24 Hrs  T(F): 98.6 (07 Sep 2024 07:51), Max: 98.6 (07 Sep 2024 07:51)  HR: 56 (07 Sep 2024 07:51) (48 - 64)  BP: 103/69 (07 Sep 2024 07:51) (92/52 - 106/66)  RR: 18 (07 Sep 2024 07:51) (16 - 18)  SpO2: 98% (07 Sep 2024 07:51) (96% - 99%)  I&O's Summary    06 Sep 2024 07:01  -  07 Sep 2024 07:00  --------------------------------------------------------  IN: 720 mL / OUT: 400 mL / NET: 320 mL    PHYSICAL EXAM:  General: NAD, A/O x 3  ENT: No gross hearing impairment, Moist mucous membranes, no thrush  Neck: Supple, No JVD  Lungs: Clear to auscultation bilaterally, good air entry, non-labored breathing  Cardio: RRR, S1/S2, No murmur  Abdomen: Soft, Nontender, Nondistended; Bowel sounds present  Extremities: No calf tenderness, No cyanosis, No pitting edema  Psych: Appropriate mood and affect    LABS:    09-07    139  |  104  |  22.2  ----------------------------<  90  4.5   |  24.0  |  0.62    Ca    9.0      07 Sep 2024 04:55  Mg     2.1     09-07    TPro  5.8  /  Alb  3.4  /  TBili  0.3  /  DBili  x   /  AST  233  /  ALT  305  /  AlkPhos  60  09-07 09-05 Chol 217 mg/dL LDL -- HDL 64 mg/dL Trig 142 mg/dL    Urinalysis Basic - ( 07 Sep 2024 04:55 )    Color: x / Appearance: x / SG: x / pH: x  Gluc: 90 mg/dL / Ketone: x  / Bili: x / Urobili: x   Blood: x / Protein: x / Nitrite: x   Leuk Esterase: x / RBC: x / WBC x   Sq Epi: x / Non Sq Epi: x / Bacteria: x    RADIOLOGY & ADDITIONAL TESTS:    Care Discussed with Consultants/Other Providers:

## 2024-09-07 NOTE — PROGRESS NOTE ADULT - ASSESSMENT
22 y/o F with a h/o multiple syncopal episodes getting workup outpatient admitted for PEA/VT cardiac arrest, s/p ROSC after 3 rounds of CPR, intubated and admitted to the ICU s/p with cardiogenic shock and acute systolic heart failure. She was started on dobutamine and pressors, now both weaned off. TTE with reduced EF 25-30%.   Course further c/b acute resp failure wiht hypoxia suspect 2/2 asp and cardaic arrest, on Zosyn. extubated and stable on RA, transferred to medicine on 9/3. Patient had cardiac mri showing no scar tissue and improved ef. Patient is now planned for leadless ICD as per EP on monday.     PEA/VT cardiac arrest  Brugada Syndrome  Cardiogenic shock s/p dobutamine drip  Acute systolic heart failure  - s/p cardiac arrest/ICU/dobutamine  - now stable  - for ICD placement on 9/9/24  - TTE appreciated  - Cardiac MRI performed, shows no scarring  - npo night of 9/8/24  - ct surgery following for ICU     Acute hypoxemic respiratory failure  Aspiration pneumonia  - CT noted with asp pneumonitis   - extubated 8/30 and now on RA  - s/p zosyn     hld - diet and exercise  total chol 217    Dispo - icd on monday     likely needs outpatient genetic testing given cardiac arrest / Brugada       24 y/o F with a h/o multiple syncopal episodes getting workup outpatient admitted for PEA/VT cardiac arrest, s/p ROSC after 3 rounds of CPR, intubated and admitted to the ICU s/p with cardiogenic shock and acute systolic heart failure. She was started on dobutamine and pressors, now both weaned off. TTE with reduced EF 25-30%.   Course further c/b acute resp failure wiht hypoxia suspect 2/2 asp and cardaic arrest, on Zosyn. extubated and stable on RA, transferred to medicine on 9/3. Patient had cardiac mri showing no scar tissue and improved ef. Patient is now planned for leadless ICD as per EP on monday.     PEA/VT cardiac arrest  Brugada Syndrome  Cardiogenic shock s/p dobutamine drip  Acute systolic heart failure  - s/p cardiac arrest/ICU/dobutamine  - now stable  - for ICD placement on 9/9/24  - TTE appreciated  - Cardiac MRI performed, shows no scarring  - npo night of 9/8/24  - EP for ICD     Acute hypoxemic respiratory failure  Aspiration pneumonia  - CT noted with asp pneumonitis   - extubated 8/30 and now on RA  - s/p zosyn     hld - diet and exercise  total chol 217    Dispo - icd on monday     likely needs outpatient genetic testing given cardiac arrest / Brugada

## 2024-09-08 PROCEDURE — 99232 SBSQ HOSP IP/OBS MODERATE 35: CPT

## 2024-09-08 RX ADMIN — Medication 5000 UNIT(S): at 06:50

## 2024-09-08 RX ADMIN — LISINOPRIL 2.5 MILLIGRAM(S): 10 TABLET ORAL at 06:48

## 2024-09-08 RX ADMIN — Medication 5000 UNIT(S): at 17:21

## 2024-09-08 NOTE — PROGRESS NOTE ADULT - ASSESSMENT
22 y/o F with a h/o multiple syncopal episodes getting workup outpatient admitted for PEA/VT cardiac arrest, s/p ROSC after 3 rounds of CPR, intubated and admitted to the ICU s/p with cardiogenic shock and acute systolic heart failure. She was started on dobutamine and pressors, now both weaned off. TTE with reduced EF 25-30%.   Course further c/b acute resp failure wiht hypoxia suspect 2/2 asp and cardaic arrest, on Zosyn. extubated and stable on RA, transferred to medicine on 9/3. Patient had cardiac mri showing no scar tissue and improved ef. Patient is now planned for leadless ICD as per EP on monday.     PEA/VT cardiac arrest  Brugada Syndrome  Cardiogenic shock s/p dobutamine drip  Acute systolic heart failure  - s/p cardiac arrest/ICU/dobutamine  - now stable  - for ICD placement on 9/9/24  - TTE appreciated  - Cardiac MRI performed, shows no scarring  - npo night of 9/8/24    Acute hypoxemic respiratory failure  Aspiration pneumonia  - CT noted with asp pneumonitis   - extubated 8/30 and now on RA  - s/p zosyn     hld - diet and exercise  total chol 217    Dispo - icd on monday 9/9/24    likely needs outpatient genetic testing given cardiac arrest / Brugada

## 2024-09-08 NOTE — PROGRESS NOTE ADULT - SUBJECTIVE AND OBJECTIVE BOX
Dc Weinberg MD  [facility]  Contact via Teams or text/call at [phone]    Patient is a 23y old  Female who presents with a chief complaint of Cardiac Arrest (07 Sep 2024 09:30)    Feels good.  No acute issues.      Patient seen and examined at bedside. No overnight events reported.     ALLERGIES:  Allergy Status Unknown    MEDICATIONS  (STANDING):  chlorhexidine 2% Cloths 1 Application(s) Topical daily  heparin   Injectable 5000 Unit(s) SubCutaneous every 12 hours  lisinopril 2.5 milliGRAM(s) Oral daily  polyethylene glycol 3350 17 Gram(s) Oral daily  senna 2 Tablet(s) Oral at bedtime    MEDICATIONS  (PRN):  aluminum hydroxide/magnesium hydroxide/simethicone Suspension 30 milliLiter(s) Oral every 4 hours PRN Dyspepsia  sodium chloride 0.9% lock flush 10 milliLiter(s) IV Push every 1 hour PRN Pre/post blood products, medications, blood draw, and to maintain line patency    Vital Signs Last 24 Hrs  T(F): 98 (08 Sep 2024 05:47), Max: 98.2 (07 Sep 2024 11:50)  HR: 49 (08 Sep 2024 05:47) (49 - 60)  BP: 112/64 (08 Sep 2024 05:47) (104/60 - 112/64)  RR: 18 (08 Sep 2024 05:47) (18 - 18)  SpO2: 96% (08 Sep 2024 05:47) (96% - 98%)  I&O's Summary    07 Sep 2024 07:01  -  08 Sep 2024 07:00  --------------------------------------------------------  IN: 960 mL / OUT: 0 mL / NET: 960 mL      PHYSICAL EXAM:  General: NAD, A/O x 3  ENT: No gross hearing impairment, Moist mucous membranes, no thrush  Neck: Supple, No JVD  Lungs: Clear to auscultation bilaterally, good air entry, non-labored breathing  Cardio: RRR, S1/S2, No murmur  Abdomen: Soft, Nontender, Nondistended; Bowel sounds present  Extremities: No calf tenderness, No cyanosis, No pitting edema  Psych: Appropriate mood and affect    LABS:    09-07    139  |  104  |  22.2  ----------------------------<  90  4.5   |  24.0  |  0.62    Ca    9.0      07 Sep 2024 04:55  Mg     2.1     09-07    TPro  5.8  /  Alb  3.4  /  TBili  0.3  /  DBili  x   /  AST  233  /  ALT  305  /  AlkPhos  60  09-07 09-05 Chol 217 mg/dL LDL -- HDL 64 mg/dL Trig 142 mg/dL    Urinalysis Basic - ( 07 Sep 2024 04:55 )    Color: x / Appearance: x / SG: x / pH: x  Gluc: 90 mg/dL / Ketone: x  / Bili: x / Urobili: x   Blood: x / Protein: x / Nitrite: x   Leuk Esterase: x / RBC: x / WBC x   Sq Epi: x / Non Sq Epi: x / Bacteria: x    RADIOLOGY & ADDITIONAL TESTS:    Care Discussed with Consultants/Other Providers:

## 2024-09-08 NOTE — PROGRESS NOTE ADULT - SUBJECTIVE AND OBJECTIVE BOX
[place] [facility] - [facility], THE HEART CENTER                                   [address] [place] [address]                                                      PHONE: [phone]                                                         FAX: [phone]  [email or web address]  ---------------------------------------------------------------------------------------------------------------------------------    Overnight events/patient complaints: Feels fine.  No CP, palpitations or dizziness.  Ambulating without problems.  Tele- sinus rhythm, no PVCs/VT.  Occasional sinus sonia.      Allergy Status Unknown    MEDICATIONS  (STANDING):  chlorhexidine 2% Cloths 1 Application(s) Topical daily  heparin   Injectable 5000 Unit(s) SubCutaneous every 12 hours  lisinopril 2.5 milliGRAM(s) Oral daily  polyethylene glycol 3350 17 Gram(s) Oral daily  senna 2 Tablet(s) Oral at bedtime    MEDICATIONS  (PRN):  aluminum hydroxide/magnesium hydroxide/simethicone Suspension 30 milliLiter(s) Oral every 4 hours PRN Dyspepsia  sodium chloride 0.9% lock flush 10 milliLiter(s) IV Push every 1 hour PRN Pre/post blood products, medications, blood draw, and to maintain line patency      Vital Signs Last 24 Hrs  T(C): 36.4 (08 Sep 2024 08:35), Max: 36.8 (07 Sep 2024 11:50)  T(F): 97.5 (08 Sep 2024 08:35), Max: 98.2 (07 Sep 2024 11:50)  HR: 48 (08 Sep 2024 08:35) (48 - 60)  BP: 92/50 (08 Sep 2024 08:35) (92/50 - 112/64)  BP(mean): 75 (07 Sep 2024 22:00) (75 - 75)  RR: 18 (08 Sep 2024 08:35) (18 - 18)  SpO2: 98% (08 Sep 2024 08:35) (96% - 98%)    Parameters below as of 08 Sep 2024 08:35  Patient On (Oxygen Delivery Method): room air      ICU Vital Signs Last 24 Hrs  NANY RODRIGES  I&O's Detail    07 Sep 2024 07:01  -  08 Sep 2024 07:00  --------------------------------------------------------  IN:    IV PiggyBack: 120 mL    Oral Fluid: 840 mL  Total IN: 960 mL    OUT:  Total OUT: 0 mL    Total NET: 960 mL        I&O's Summary    07 Sep 2024 07:01  -  08 Sep 2024 07:00  --------------------------------------------------------  IN: 960 mL / OUT: 0 mL / NET: 960 mL      Drug Dosing Weight  NANY RODRIGES      PHYSICAL EXAM:  General: alert and cooperative.  HEENT: Head; normocephalic, atraumatic.  Eyes: Pupils reactive, cornea wnl.  Neck: Supple, no nodes adenopathy, no NVD or carotid bruit or thyromegaly.  CARDIOVASCULAR: Normal S1 and S2, No murmur, rub, gallop or lift.   LUNGS: No rales, rhonchi or wheeze. Normal breath sounds bilaterally.  ABDOMEN: Soft, nontender without mass or organomegaly. bowel sounds normoactive.  EXTREMITIES: No clubbing, cyanosis or edema. Distal pulses wnl.   SKIN: warm and dry with normal turgor.  NEURO: Alert/oriented x 3/normal motor exam. No pathologic reflexes.    PSYCH: normal affect.        LABS:    09-07    139  |  104  |  22.2<H>  ----------------------------<  90  4.5   |  24.0  |  0.62    Ca    9.0      07 Sep 2024 04:55  Mg     2.1     09-07    TPro  5.8<L>  /  Alb  3.4  /  TBili  0.3<L>  /  DBili  x   /  AST  233<H>  /  ALT  305<H>  /  AlkPhos  60  09-07    NANY RODRIGES        Urinalysis Basic - ( 07 Sep 2024 04:55 )    Color: x / Appearance: x / SG: x / pH: x  Gluc: 90 mg/dL / Ketone: x  / Bili: x / Urobili: x   Blood: x / Protein: x / Nitrite: x   Leuk Esterase: x / RBC: x / WBC x   Sq Epi: x / Non Sq Epi: x / Bacteria: x          ASSESSMENT AND PLAN:  s/p cardiac arrest  suspected exercise-induced Brugada syndrome  rhythm stable on tele  for extravascular ICD Wednesday  trend LFTs, suspect elevation related to prior cardiac arrest/shock, expect improvement

## 2024-09-09 LAB
ALBUMIN SERPL ELPH-MCNC: 3.4 G/DL — SIGNIFICANT CHANGE UP (ref 3.3–5.2)
ALP SERPL-CCNC: 68 U/L — SIGNIFICANT CHANGE UP (ref 40–120)
ALT FLD-CCNC: 300 U/L — HIGH
ANION GAP SERPL CALC-SCNC: 8 MMOL/L — SIGNIFICANT CHANGE UP (ref 5–17)
AST SERPL-CCNC: 135 U/L — HIGH
BILIRUB SERPL-MCNC: 0.4 MG/DL — SIGNIFICANT CHANGE UP (ref 0.4–2)
BUN SERPL-MCNC: 16.6 MG/DL — SIGNIFICANT CHANGE UP (ref 8–20)
CALCIUM SERPL-MCNC: 8.5 MG/DL — SIGNIFICANT CHANGE UP (ref 8.4–10.5)
CHLORIDE SERPL-SCNC: 102 MMOL/L — SIGNIFICANT CHANGE UP (ref 96–108)
CO2 SERPL-SCNC: 28 MMOL/L — SIGNIFICANT CHANGE UP (ref 22–29)
CREAT SERPL-MCNC: 0.55 MG/DL — SIGNIFICANT CHANGE UP (ref 0.5–1.3)
EGFR: 133 ML/MIN/1.73M2 — SIGNIFICANT CHANGE UP
GLUCOSE SERPL-MCNC: 83 MG/DL — SIGNIFICANT CHANGE UP (ref 70–99)
POTASSIUM SERPL-MCNC: 4.1 MMOL/L — SIGNIFICANT CHANGE UP (ref 3.5–5.3)
POTASSIUM SERPL-SCNC: 4.1 MMOL/L — SIGNIFICANT CHANGE UP (ref 3.5–5.3)
PROT SERPL-MCNC: 6.2 G/DL — LOW (ref 6.6–8.7)
SODIUM SERPL-SCNC: 138 MMOL/L — SIGNIFICANT CHANGE UP (ref 135–145)

## 2024-09-09 PROCEDURE — 99232 SBSQ HOSP IP/OBS MODERATE 35: CPT

## 2024-09-09 RX ORDER — LISINOPRIL 10 MG/1
2.5 TABLET ORAL
Refills: 0 | Status: DISCONTINUED | OUTPATIENT
Start: 2024-09-09 | End: 2024-09-11

## 2024-09-09 RX ADMIN — LISINOPRIL 2.5 MILLIGRAM(S): 10 TABLET ORAL at 17:06

## 2024-09-09 RX ADMIN — Medication 5000 UNIT(S): at 05:22

## 2024-09-09 RX ADMIN — LISINOPRIL 2.5 MILLIGRAM(S): 10 TABLET ORAL at 05:22

## 2024-09-09 NOTE — PROGRESS NOTE ADULT - ASSESSMENT
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

## 2024-09-09 NOTE — PROGRESS NOTE ADULT - ASSESSMENT
22 y/o F with a h/o multiple syncopal episodes getting workup outpatient admitted for PEA/VT cardiac arrest, s/p ROSC after 3 rounds of CPR, intubated and admitted to the ICU s/p with cardiogenic shock and acute systolic heart failure. She was started on dobutamine and pressors, now both weaned off. TTE with reduced EF 25-30%.   Course further c/b acute resp failure wiht hypoxia suspect 2/2 asp and cardaic arrest, on Zosyn. extubated and stable on RA, transferred to medicine on 9/3. Patient had cardiac mri showing no scar tissue and improved ef. Patient is now planned for leadless ICD as per EP on monday.     s/p PEA/VT cardiac arrest  Brugada Syndrome  Cardiogenic shock s/p dobutamine drip  Acute systolic heart failure  - s/p cardiac arrest/ICU/dobutamine  - now stable  - for ICD placement on 9/11/24  - TTE appreciated  - Cardiac MRI performed, shows no scarring    Acute hypoxemic respiratory failure  Aspiration pneumonia  - RESOLVED  - CT noted with asp pneumonitis   - extubated 8/30 and now on RA  - s/p zosyn     hld - diet and exercise  total chol 217    Dispo - icd on monday 9/11/24    likely needs outpatient genetic testing given cardiac arrest / Brugada

## 2024-09-09 NOTE — PROGRESS NOTE ADULT - ASSESSMENT
23y Female with prior h/o near syncope and 1 episode of syncope s/p cardiac arrest while running. Patient was found PEA, 3 rounds of CPR, 1 epi in the field, found to be in VTach and defibrillated. Upon arrival to ED, sinus bradycardia in 30s, atropine was given with improvement in HR, patient intubated for airway protection. Now extubated. Upon re-evaluation of prior exercise stress test, type 1 brugada was found. Patient now planned for secondary prevention EV-AICD. CT surgery consulted for assistance with EV-AICD placement.

## 2024-09-09 NOTE — PROGRESS NOTE ADULT - SUBJECTIVE AND OBJECTIVE BOX
Dc Weinberg MD  [facility]  Contact via Teams or text/call at [phone]    Patient is a 23y old  Female who presents with a chief complaint of Cardiac Arrest (09 Sep 2024 09:26)    Feels well.  No acute issues.  Seen walking around with mother.      Patient seen and examined at bedside. No overnight events reported.     ALLERGIES:  Allergy Status Unknown    MEDICATIONS  (STANDING):  chlorhexidine 2% Cloths 1 Application(s) Topical daily  heparin   Injectable 5000 Unit(s) SubCutaneous every 12 hours  lisinopril 2.5 milliGRAM(s) Oral daily  polyethylene glycol 3350 17 Gram(s) Oral daily  senna 2 Tablet(s) Oral at bedtime    MEDICATIONS  (PRN):  aluminum hydroxide/magnesium hydroxide/simethicone Suspension 30 milliLiter(s) Oral every 4 hours PRN Dyspepsia  sodium chloride 0.9% lock flush 10 milliLiter(s) IV Push every 1 hour PRN Pre/post blood products, medications, blood draw, and to maintain line patency    Vital Signs Last 24 Hrs  T(F): 97.6 (09 Sep 2024 08:26), Max: 97.6 (09 Sep 2024 04:39)  HR: 46 (09 Sep 2024 08:26) (46 - 64)  BP: 99/53 (09 Sep 2024 08:26) (99/53 - 128/88)  RR: 18 (09 Sep 2024 08:26) (16 - 18)  SpO2: 96% (09 Sep 2024 08:26) (96% - 99%)  I&O's Summary    08 Sep 2024 07:01  -  09 Sep 2024 07:00  --------------------------------------------------------  IN: 240 mL / OUT: 0 mL / NET: 240 mL      PHYSICAL EXAM:  General: NAD, A/O x 3  ENT: No gross hearing impairment, Moist mucous membranes, no thrush  Neck: Supple, No JVD  Lungs: Clear to auscultation bilaterally, good air entry, non-labored breathing  Cardio: RRR, S1/S2, No murmur  Abdomen: Soft, Nontender, Nondistended; Bowel sounds present  Extremities: No calf tenderness, No cyanosis, No pitting edema  Psych: Appropriate mood and affect    LABS:    09-07    139  |  104  |  22.2  ----------------------------<  90  4.5   |  24.0  |  0.62    Ca    9.0      07 Sep 2024 04:55  Mg     2.1     09-07    TPro  5.8  /  Alb  3.4  /  TBili  0.3  /  DBili  x   /  AST  233  /  ALT  305  /  AlkPhos  60  09-07 09-05 Chol 217 mg/dL LDL -- HDL 64 mg/dL Trig 142 mg/dL    Urinalysis Basic - ( 07 Sep 2024 04:55 )    Color: x / Appearance: x / SG: x / pH: x  Gluc: 90 mg/dL / Ketone: x  / Bili: x / Urobili: x   Blood: x / Protein: x / Nitrite: x   Leuk Esterase: x / RBC: x / WBC x   Sq Epi: x / Non Sq Epi: x / Bacteria: x    RADIOLOGY & ADDITIONAL TESTS:    Care Discussed with Consultants/Other Providers:

## 2024-09-09 NOTE — PROGRESS NOTE ADULT - SUBJECTIVE AND OBJECTIVE BOX
[place] [facility] - [facility], THE [facility]                                   [address] [place] [address]                                                      PHONE: [phone]                                                         FAX: [phone]  [email or web address]  ---------------------------------------------------------------------------------------------------------------------------------    Overnight events/patient complaints: feels well, no arrhythmias on tele      Allergy Status Unknown    MEDICATIONS  (STANDING):  chlorhexidine 2% Cloths 1 Application(s) Topical daily  heparin   Injectable 5000 Unit(s) SubCutaneous every 12 hours  lisinopril 2.5 milliGRAM(s) Oral daily  polyethylene glycol 3350 17 Gram(s) Oral daily  senna 2 Tablet(s) Oral at bedtime    MEDICATIONS  (PRN):  aluminum hydroxide/magnesium hydroxide/simethicone Suspension 30 milliLiter(s) Oral every 4 hours PRN Dyspepsia  sodium chloride 0.9% lock flush 10 milliLiter(s) IV Push every 1 hour PRN Pre/post blood products, medications, blood draw, and to maintain line patency      Vital Signs Last 24 Hrs  T(C): 36.4 (09 Sep 2024 08:26), Max: 36.4 (08 Sep 2024 16:57)  T(F): 97.6 (09 Sep 2024 08:26), Max: 97.6 (09 Sep 2024 04:39)  HR: 46 (09 Sep 2024 08:26) (46 - 64)  BP: 99/53 (09 Sep 2024 08:26) (99/53 - 128/88)  BP(mean): 70 (08 Sep 2024 16:57) (70 - 70)  RR: 18 (09 Sep 2024 08:26) (16 - 18)  SpO2: 96% (09 Sep 2024 08:26) (96% - 99%)    Parameters below as of 09 Sep 2024 08:26  Patient On (Oxygen Delivery Method): room air      Daily     Daily   ICU Vital Signs Last 24 Hrs  NANY HANNAELIZABETH  I&O's Detail    08 Sep 2024 07:01  -  09 Sep 2024 07:00  --------------------------------------------------------  IN:    Oral Fluid: 240 mL  Total IN: 240 mL    OUT:  Total OUT: 0 mL    Total NET: 240 mL      09 Sep 2024 07:01  -  09 Sep 2024 10:42  --------------------------------------------------------  IN:    Oral Fluid: 240 mL  Total IN: 240 mL    OUT:  Total OUT: 0 mL    Total NET: 240 mL        I&O's Summary    08 Sep 2024 07:01  -  09 Sep 2024 07:00  --------------------------------------------------------  IN: 240 mL / OUT: 0 mL / NET: 240 mL    09 Sep 2024 07:01  -  09 Sep 2024 10:42  --------------------------------------------------------  IN: 240 mL / OUT: 0 mL / NET: 240 mL      Drug Dosing Weight  NANY RASMUSSENGREGOR      PHYSICAL EXAM:  General: Appears alert and cooperative.  HEENT: Head; normocephalic, atraumatic.  Eyes: Pupils reactive, cornea wnl.  Neck: Supple, no nodes adenopathy, no NVD or carotid bruit or thyromegaly.  CARDIOVASCULAR: Normal S1 and S2, No murmur, rub, gallop or lift.   LUNGS: No rales, rhonchi or wheeze. Normal breath sounds bilaterally.  ABDOMEN: Soft, nontender without mass or organomegaly. bowel sounds normoactive.  EXTREMITIES: No clubbing, cyanosis or edema. Distal pulses wnl.   SKIN: warm and dry with normal turgor.  NEURO: Alert/oriented x 3/normal motor exam. No pathologic reflexes.    PSYCH: normal affect.        LABS:          NANY RASMUSSENGREGOR            RADIOLOGY & ADDITIONAL STUDIES:    INTERPRETATION OF TELEMETRY (personally reviewed):  < from: Xray Chest 1 View- PORTABLE-Routine (Xray Chest 1 View- PORTABLE-Routine in AM.) (08.31.24 @ 05:17) >    ACC: [id number] EXAM:  XR CHEST PORTABLE ROUTINE 1V   ORDERED BY:      PROCEDURE DATE:  08/31/2024          INTERPRETATION:  Follow-up.    AP chest. Prior dated 8/30/2024.    IMPRESSION: Left costophrenic angle excluded.  Low lung volumes. Endotracheal tube nasogastric tube have been removed.   Right internal jugular line reidentified in position. No change heart   mediastinum. Grossly clear lungs. No consolidation pneumothorax or   effusion    --- End of Report ---            ALAN GARCIA MD;Attending Radiologist  This document has been electronically signed. Sep  1 2024 12:50PM    < end of copied text >    ECG:< from: 12 Lead ECG (09.03.24 @ 07:52) >    Diagnosis Line Sinus bradycardia  Right axis deviation  Incomplete right bundle branch block  Septal infarct , age undetermined  Nonspecific ST and T wave abnormality  Abnormal ECG    Confirmed by Kai Brown DO ([id number]) on 9/3/2024 5:38:52 PM    < end of copied text >      ECHO:  < from: TTE W or WO Ultrasound Enhancing Agent (09.03.24 @ 11:23) >  TRANSTHORACIC ECHOCARDIOGRAM REPORT  ________________________________________________________________________________                                      _______       Pt. Name:       NANY RODRIGES Study Date:    9/3/2024  MRN:            [id number]       YOB: 2000  Accession #:    [id number]        Age:           23 years  Account#:       [id number]       Gender:        F  Heart Rate:                      Height:        65.00 in (165.10 cm)  Rhythm:                          Weight:        140.00 lb (63.50 kg)  Blood Pressure: 98/61 mmHg       BSA/BMI:       1.70 m² / 23.30 kg/m²  ________________________________________________________________________________________  Referring Physician:    [id number] Cuauhtemoc Alicea  Interpreting Physician: Sammy Lucio MD  Primary Sonographer:    Noa Powell    CPT:                ECHO TTE WITH CON COMP W DOPP - .m;DEFINITY ECHO                      CONTRAST PER ML - .m  Indication(s):      Cardiogenic shock - R57.0  Procedure:          Transthoracic echocardiogram with 2-D, M-mode and complete                      spectral and color flow Doppler.  Ordering Location:  [facility]  Admission Status:   Inpatient  Contrast Injection: Verbal consent was obtained for injection of Ultrasonic                      Enhancing Agent following a discussion of risks and                      benefits.                      Endocardial visualization enhanced with 2 ml of Definity                      Ultrasound enhancing agent (Lot#:[id number]).  UEA Reaction:       Patient had no adverse reaction after injection of                      Ultrasound Enhancing Agent.    _______________________________________________________________________________________     CONCLUSIONS:     1. Left ventricular cavity is normal in size. Left ventricular wall thickness is normal. Left ventricular systolic function is severely decreased with an ejection fraction visually estimated at 30 to 35 %.   2. Pulmonary artery systolic pressure could not be estimated.   3. Trace pericardial effusion.      < end of copied text >      < from: MR Cardiac w/wo IV Cont (09.04.24 @ 16:00) >  ACC: [id number] EXAM:  MR CARD MORPH FUNCTION WAW IC   ORDERED BY:   DORYS MIREYA     PROCEDURE DATE:  09/04/2024          INTERPRETATION:  HISTORY: 23-year-old woman. Cardiac arrest. Cardiogenic   shock. Echocardiogram performed 9/3/2024 demonstrated LVEF of 30-35%.    EXAMINATION: Cardiac MRI without and with Gadolinium.    TECHNIQUE: Cardiac MRI was performed before and after the administration   of intravenous gadolinium.    CONTRAST/COMPLICATIONS:  IV Contrast: Gadavist  9 cc administered   1 cc discarded  Oral Contrast: NONE  Complications: None reported at time of study completion      FINDINGS:    Chambers: The left ventricle is normal in size. There is no left   ventricular hypertrophy/thickening. Wall motion of the left ventricular   mid wall segments is hypokinetic. The wall motion of the left ventricular   basilar and apical wall segments appear preserved.    The left atrium is normal in size.    The right ventricle is normal in size. There is normal right ventricular   wall motion and function.    The right atrium is normal in size.    No immediate resting perfusion defects identified within the left   ventricular myocardium after administration of gadolinium. There is no   convincing evidence of late gadolinium enhancement of the left or right   ventricular myocardium.    LVEDV is 179 mL  LVESV is 85 mL  LVSV is 94 mL  LVEF is 53%    RVEDV is 150 mL  RVESV is 62 mL  RVSV is 88 mL  RVEF is 59%      Valves: No CMR evidence of valvular abnormality.    Vessels: Thoracic aorta is normal in caliber.    Thorax: Small pericardial effusion. Trace left pleural effusion. No large   mediastinal or axillary nodes.    Imaged Abdomen: Unremarkable.    IMPRESSION:.    No convincing evidence of myocardial scarring.    There is hypokinesis of the mid LV wall segments and preserved wall   motion of the LV basilar and apical wall segments.    The calculated left ventricular ejection fraction is 53%.    Small pericardial effusion.    --- End of Report ---            MARLIN BULLOCK MD; Attending Radiologist  This document has been electronically signed. Sep  4 2024  4:48PM    < end of copied text >

## 2024-09-09 NOTE — PROGRESS NOTE ADULT - SUBJECTIVE AND OBJECTIVE BOX
BRIEF HOSPITAL COURSE: 23y Female with prior h/o near syncope and 1 episode of syncope s/p cardiac arrest while running. Patient was found PEA, 3 rounds of CPR, 1 epi in the field, found to be in VTach and defibrillated. Upon arrival to ED, sinus bradycardia in 30s, atropine was given with improvement in HR, patient intubated for airway protection. Now extubated. Upon re-evaluation of prior exercise stress test, type 1 brugada was found. Patient now planned for secondary prevention EV-AICD. CT surgery consulted for assistance with EV-AICD placement.     SIGNIFICANT RECENT/PAST 24 HR EVENTS  No acute events reported overnight.     SUBJECTIVE  Pt examined at bedside in NAD. Denies fevers, chills, HA, dizziness, CP, SOB, abd pain, N/V/D, numbness/tingling in extremities, or any other acute complaints. States pain well controlled on current regimen.   ROS negative x 10 systems except as noted above.    PAST MEDICAL & SURGICAL HISTORY  Syncopal episodes    DAILY REVIEW  Telemetry:  Vital Signs Last 24 Hrs  T(C): 36.4 (09 Sep 2024 08:26), Max: 36.4 (08 Sep 2024 16:57)  T(F): 97.6 (09 Sep 2024 08:26), Max: 97.6 (09 Sep 2024 04:39)  HR: 46 (09 Sep 2024 08:26) (46 - 64)  BP: 99/53 (09 Sep 2024 08:26) (99/53 - 128/88)  BP(mean): 70 (08 Sep 2024 16:57) (70 - 70)  RR: 18 (09 Sep 2024 08:26) (16 - 18)  SpO2: 96% (09 Sep 2024 08:26) (96% - 99%)    Parameters below as of 09 Sep 2024 08:26  Patient On (Oxygen Delivery Method): room air    I&O's Detail    08 Sep 2024 07:01  -  09 Sep 2024 07:00  --------------------------------------------------------  IN:    Oral Fluid: 240 mL  Total IN: 240 mL    OUT:  Total OUT: 0 mL    Total NET: 240 mL    Last Bowel Movement: 07-Sep-2024 (09-07-24 @ 20:54)  Last Bowel Movement: 07-Sep-2024 (09-07-24 @ 08:24)  Last Bowel Movement: 02-Sep-2024 (09-02-24 @ 20:00)  Last Bowel Movement: 02-Sep-2024 (09-02-24 @ 08:00)  Last Bowel Movement: 01-Sep-2024 (09-01-24 @ 20:00)    Daily     Daily   Admit Wt: Drug Dosing Weight  Height (cm): 167.6 (29 Aug 2024 14:40)  Weight (kg): 64 (29 Aug 2024 14:40)  BMI (kg/m2): 22.8 (29 Aug 2024 14:40)  BSA (m2): 1.72 (29 Aug 2024 14:40)      MEDICATIONS  Home Medications: None    MEDICATIONS  (STANDING):  chlorhexidine 2% Cloths 1 Application(s) Topical daily  heparin   Injectable 5000 Unit(s) SubCutaneous every 12 hours  lisinopril 2.5 milliGRAM(s) Oral daily  polyethylene glycol 3350 17 Gram(s) Oral daily  senna 2 Tablet(s) Oral at bedtime    MEDICATIONS  (PRN):  aluminum hydroxide/magnesium hydroxide/simethicone Suspension 30 milliLiter(s) Oral every 4 hours PRN Dyspepsia  sodium chloride 0.9% lock flush 10 milliLiter(s) IV Push every 1 hour PRN Pre/post blood products, medications, blood draw, and to maintain line patency    ALLERGIES  Allergy Status Unknown    DIAGNOSTICS  All relevant and available laboratory results, radiology and medications reviewed.    PHYSICAL EXAM  General: NAD  Neurology: Awake, nonfocal  Eyes: Scleras clear, Gross vision intact  ENT: Gross hearing intact, no stridor  Neck: Neck supple, trachea midline, No JVD  Respiratory: CTA B/L, No wheezing, rales, rhonchi  CV: RRR, S1S2, no murmurs, rubs or gallops  Extremities: No edema, + peripheral pulses  Skin: No Rashes, Hematoma, Ecchymosis

## 2024-09-09 NOTE — CHART NOTE - NSCHARTNOTEFT_GEN_A_CORE
Source: Patient [ x]  Family [ x]   other [x ] EMR    22 y/o F with a h/o multiple syncopal episodes getting workup outpatient admitted for PEA/VT cardiac arrest, s/p ROSC after 3 rounds of CPR, intubated and admitted to the ICU s/p with cardiogenic shock and acute systolic heart failure. She was started on dobutamine and pressors, now both weaned off. TTE with reduced EF 25-30%.   Course further c/b acute resp failure wiht hypoxia suspect 2/2 asp and cardaic arrest, on Zosyn. extubated and stable on RA, transferred to medicine on 9/3. Patient had cardiac mri showing no scar tissue and improved ef. Patient is now planned for leadless ICD as per EP on monday.   s/p PEA/VT cardiac arrest  Brugada Syndrome  Cardiogenic shock s/p dobutamine drip  Acute systolic heart failure    Current Diet: Diet, DASH/TLC:   Sodium & Cholesterol Restricted  1500mL Fluid Restriction ([id number]) (08-31-24 @ 10:56)      Patient reports [ ] nausea  [ ] vomiting [ ] diarrhea [ ] constipation  [ ]chewing problems [ ] swallowing issues  [ ] other:     PO intake:  < 50% [ ]   50-75%  [ ]   %  [x ]  other :    Source for PO intake [ x] Patient [ ] family [ ] chart [ ] staff [ ] other    Enteral /Parenteral Nutrition:     Current Weight:     % Weight Change     Pertinent Medications: MEDICATIONS  (STANDING):  chlorhexidine 2% Cloths 1 Application(s) Topical daily  heparin   Injectable 5000 Unit(s) SubCutaneous every 12 hours  lisinopril 2.5 milliGRAM(s) Oral two times a day  polyethylene glycol 3350 17 Gram(s) Oral daily  senna 2 Tablet(s) Oral at bedtime    MEDICATIONS  (PRN):  aluminum hydroxide/magnesium hydroxide/simethicone Suspension 30 milliLiter(s) Oral every 4 hours PRN Dyspepsia  sodium chloride 0.9% lock flush 10 milliLiter(s) IV Push every 1 hour PRN Pre/post blood products, medications, blood draw, and to maintain line patency    Pertinent Labs:   09-09 Na138 mmol/L Glu 83 mg/dL K+ 4.1 mmol/L Cr  0.55 mg/dL BUN 16.6 mg/dL Phos n/a   Alb 3.4 g/dL PAB n/a             Skin:     Nutrition focused physical exam conducted - found signs of malnutrition [ ]absent [ ]present    Subcutaneous fat loss: [ ] Orbital fat pads region, [ ]Buccal fat region, [ ]Triceps region,  [ ]Ribs region    Muscle wasting: [ ]Temples region, [ ]Clavicle region, [ ]Shoulder region, [ ]Scapula region, [ ]Interosseous region,  [ ]thigh region, [ ]Calf region    Estimated Needs:   [ ] no change since previous assessment  [ ] recalculated:     Current Nutrition Diagnosis:  Pt presents at risk secondary to altered nutrition related lab values, related to Cardiac arrest/ cardiogenic shock, as evidenced by increased LFT's. Pt found in bed, endorses eating 100% of breakfast. At home pt reports that eats a balanced healthy diet. Healthy heart diet discussed and literature provided. Pt and mother receptive, all questions answered.     Recommendations:   1) Continue Diet  2) Weekly weight  3) RX MVI, Vit C    Monitoring and Evaluation:   [ x] PO intake [x ] Tolerance to diet prescription [X] Weights  [X] Follow up per protocol [X] Labs:

## 2024-09-10 LAB
ALBUMIN SERPL ELPH-MCNC: 3.2 G/DL — LOW (ref 3.3–5.2)
ALP SERPL-CCNC: 68 U/L — SIGNIFICANT CHANGE UP (ref 40–120)
ALT FLD-CCNC: 269 U/L — HIGH
ANION GAP SERPL CALC-SCNC: 11 MMOL/L — SIGNIFICANT CHANGE UP (ref 5–17)
AST SERPL-CCNC: 131 U/L — HIGH
BILIRUB DIRECT SERPL-MCNC: 0.1 MG/DL — SIGNIFICANT CHANGE UP (ref 0–0.3)
BILIRUB INDIRECT FLD-MCNC: 0.2 MG/DL — SIGNIFICANT CHANGE UP (ref 0.2–1)
BILIRUB SERPL-MCNC: 0.3 MG/DL — LOW (ref 0.4–2)
BLD GP AB SCN SERPL QL: SIGNIFICANT CHANGE UP
BUN SERPL-MCNC: 19.7 MG/DL — SIGNIFICANT CHANGE UP (ref 8–20)
CALCIUM SERPL-MCNC: 9.1 MG/DL — SIGNIFICANT CHANGE UP (ref 8.4–10.5)
CHLORIDE SERPL-SCNC: 103 MMOL/L — SIGNIFICANT CHANGE UP (ref 96–108)
CO2 SERPL-SCNC: 24 MMOL/L — SIGNIFICANT CHANGE UP (ref 22–29)
CREAT SERPL-MCNC: 0.58 MG/DL — SIGNIFICANT CHANGE UP (ref 0.5–1.3)
EGFR: 131 ML/MIN/1.73M2 — SIGNIFICANT CHANGE UP
GLUCOSE SERPL-MCNC: 84 MG/DL — SIGNIFICANT CHANGE UP (ref 70–99)
HCT VFR BLD CALC: 40.7 % — SIGNIFICANT CHANGE UP (ref 34.5–45)
HGB BLD-MCNC: 13.3 G/DL — SIGNIFICANT CHANGE UP (ref 11.5–15.5)
MCHC RBC-ENTMCNC: 31.4 PG — SIGNIFICANT CHANGE UP (ref 27–34)
MCHC RBC-ENTMCNC: 32.7 GM/DL — SIGNIFICANT CHANGE UP (ref 32–36)
MCV RBC AUTO: 96.2 FL — SIGNIFICANT CHANGE UP (ref 80–100)
PLATELET # BLD AUTO: 230 K/UL — SIGNIFICANT CHANGE UP (ref 150–400)
POTASSIUM SERPL-MCNC: 4.9 MMOL/L — SIGNIFICANT CHANGE UP (ref 3.5–5.3)
POTASSIUM SERPL-SCNC: 4.9 MMOL/L — SIGNIFICANT CHANGE UP (ref 3.5–5.3)
PROT SERPL-MCNC: 6.1 G/DL — LOW (ref 6.6–8.7)
RBC # BLD: 4.23 M/UL — SIGNIFICANT CHANGE UP (ref 3.8–5.2)
RBC # FLD: 13.2 % — SIGNIFICANT CHANGE UP (ref 10.3–14.5)
SODIUM SERPL-SCNC: 138 MMOL/L — SIGNIFICANT CHANGE UP (ref 135–145)
WBC # BLD: 5.94 K/UL — SIGNIFICANT CHANGE UP (ref 3.8–10.5)
WBC # FLD AUTO: 5.94 K/UL — SIGNIFICANT CHANGE UP (ref 3.8–10.5)

## 2024-09-10 PROCEDURE — 99232 SBSQ HOSP IP/OBS MODERATE 35: CPT

## 2024-09-10 RX ADMIN — LISINOPRIL 2.5 MILLIGRAM(S): 10 TABLET ORAL at 17:59

## 2024-09-10 RX ADMIN — Medication 5000 UNIT(S): at 06:19

## 2024-09-10 RX ADMIN — LISINOPRIL 2.5 MILLIGRAM(S): 10 TABLET ORAL at 06:18

## 2024-09-10 NOTE — PROGRESS NOTE ADULT - PROBLEM SELECTOR PLAN 1
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 Walsh to assist with procedure tomorrow.   NPO after midnight.   Plan discussed with Dr. Walsh.
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 Walsh.

## 2024-09-10 NOTE — PROGRESS NOTE ADULT - ASSESSMENT
24 y/o F with a h/o multiple syncopal episodes getting workup outpatient admitted for PEA/VT cardiac arrest, s/p ROSC after 3 rounds of CPR, intubated and admitted to the ICU s/p with cardiogenic shock and acute systolic heart failure. She was started on dobutamine and pressors, now both weaned off. TTE with reduced EF 25-30%.   Course further c/b acute resp failure wiht hypoxia suspect 2/2 asp and cardaic arrest, on Zosyn. extubated and stable on RA, transferred to medicine on 9/3. Patient had cardiac mri showing no scar tissue and improved ef. Patient is now planned for leadless ICD as per EP on monday.     s/p PEA/VT cardiac arrest  Brugada Syndrome  Cardiogenic shock s/p dobutamine drip  Acute systolic heart failure  - s/p cardiac arrest/ICU/dobutamine  - now stable  - for ICD placement on 9/11/24  - TTE appreciated  - Cardiac MRI performed, shows no scarring    Acute hypoxemic respiratory failure  Aspiration pneumonia  - RESOLVED  - CT noted with asp pneumonitis   - extubated 8/30 and now on RA  - s/p zosyn     hld - diet and exercise  total chol 217    Dispo - icd tomorrow    likely needs outpatient genetic testing given cardiac arrest / Brugada

## 2024-09-10 NOTE — PROGRESS NOTE ADULT - ASSESSMENT
Assessment  s/p cardiac arrest with presumed Brugada Type 1 exercise provoked  Post arrest LV dysfunction, augmented EF on ACEI  No evidence of cardiomyopthy on MRI/no evidence of anomalous coronary  Shock liver post arrest with improving LFTs  H/o recurrent exercise related syncope      Rec  Cont ACEI  Trend LFTS/BNP   For EV ICD in am with Dr Lindsey  Eventual outpt genetic testing - SCD panel to include Brugada

## 2024-09-10 NOTE — PROGRESS NOTE ADULT - SUBJECTIVE AND OBJECTIVE BOX
Subjective:  Patient seen and examined. Patient OOB sitting in the chair, in NAD. Patient endorses soreness from chest compressions. She denies chest pain, shortness of breath, palpitations, headache, dizziness, nausea, or vomiting.     PAST MEDICAL & SURGICAL HISTORY:  Syncopal episodes    VITAL SIGNS  Vital Signs Last 24 Hrs  T(C): 36.4 (09-10-24 @ 09:08), Max: 37 (09-09-24 @ 15:47)  T(F): 97.6 (09-10-24 @ 09:08), Max: 98.6 (09-09-24 @ 15:47)  HR: 69 (09-10-24 @ 09:08) (49 - 69)  BP: 122/67 (09-10-24 @ 09:08) (104/58 - 122/67)  RR: 18 (09-10-24 @ 09:08) (15 - 18)  SpO2: 98% (09-10-24 @ 09:08) (98% - 98%)  on (O2)              MEDICATIONS  aluminum hydroxide/magnesium hydroxide/simethicone Suspension 30 milliLiter(s) Oral every 4 hours PRN  chlorhexidine 2% Cloths 1 Application(s) Topical daily  heparin   Injectable 5000 Unit(s) SubCutaneous every 12 hours  lisinopril 2.5 milliGRAM(s) Oral two times a day  polyethylene glycol 3350 17 Gram(s) Oral daily  senna 2 Tablet(s) Oral at bedtime  sodium chloride 0.9% lock flush 10 milliLiter(s) IV Push every 1 hour PRN        09-09 @ 07:01  -  09-10 @ 07:00  --------------------------------------------------------  IN: 720 mL / OUT: 0 mL / NET: 720 mL    09-10 @ 07:01  -  09-10 @ 10:29  --------------------------------------------------------  IN: 240 mL / OUT: 0 mL / NET: 240 mL      Weights:  Daily     Daily   Admit Wt: Drug Dosing Weight  Height (cm): 167.6 (29 Aug 2024 14:40)  Weight (kg): 64 (29 Aug 2024 14:40)  BMI (kg/m2): 22.8 (29 Aug 2024 14:40)  BSA (m2): 1.72 (29 Aug 2024 14:40)    LABS  09-10    138  |  103  |  19.7  ----------------------------<  84  4.9   |  24.0  |  0.58    Ca    9.1      10 Sep 2024 05:33    TPro  6.2<L>  /  Alb  3.4  /  TBili  0.4  /  DBili  x   /  AST  135<H>  /  ALT  300<H>  /  AlkPhos  68  09-09                                 13.3   5.94  )-----------( 230      ( 10 Sep 2024 05:33 )             40.7            Bilirubin Total: 0.4 mg/dL (09-09 @ 11:12)      DIAGNOSTICS:  All laboratory results, radiology and medications reviewed.    PHYSICAL EXAM  General: young female, in NAD  Neurology: Awake, nonfocal, BEAVER x 4  Eyes: Scleras clear, PERRLA/ EOMI, Gross vision intact  Neck: Neck supple, trachea midline, No JVD  Respiratory: CTA B/L, No wheezing, rales, rhonchi  CV: RRR, S1S2, no murmurs, rubs or gallops  Abdominal: Soft, NT, ND +BS  Extremities: No edema, + peripheral pulses

## 2024-09-10 NOTE — PROGRESS NOTE ADULT - SUBJECTIVE AND OBJECTIVE BOX
Dc Weinberg MD  [facility]  Contact via Teams or text/call at [phone]    Patient is a 23y old  Female who presents with a chief complaint of Cardiac Arrest (10 Sep 2024 10:36)    Feels well.  No issues. Denies chest pain, sob.      Patient seen and examined at bedside. No overnight events reported.     ALLERGIES:  Allergy Status Unknown    MEDICATIONS  (STANDING):  chlorhexidine 2% Cloths 1 Application(s) Topical daily  heparin   Injectable 5000 Unit(s) SubCutaneous every 12 hours  lisinopril 2.5 milliGRAM(s) Oral two times a day  polyethylene glycol 3350 17 Gram(s) Oral daily  senna 2 Tablet(s) Oral at bedtime    MEDICATIONS  (PRN):  aluminum hydroxide/magnesium hydroxide/simethicone Suspension 30 milliLiter(s) Oral every 4 hours PRN Dyspepsia  sodium chloride 0.9% lock flush 10 milliLiter(s) IV Push every 1 hour PRN Pre/post blood products, medications, blood draw, and to maintain line patency    Vital Signs Last 24 Hrs  T(F): 97.6 (10 Sep 2024 09:08), Max: 98.6 (09 Sep 2024 15:47)  HR: 69 (10 Sep 2024 09:08) (49 - 69)  BP: 122/67 (10 Sep 2024 09:08) (104/58 - 122/67)  RR: 18 (10 Sep 2024 09:08) (15 - 18)  SpO2: 98% (10 Sep 2024 09:08) (98% - 98%)  I&O's Summary    09 Sep 2024 07:01  -  10 Sep 2024 07:00  --------------------------------------------------------  IN: 720 mL / OUT: 0 mL / NET: 720 mL    10 Sep 2024 07:01  -  10 Sep 2024 12:33  --------------------------------------------------------  IN: 240 mL / OUT: 0 mL / NET: 240 mL      PHYSICAL EXAM:  General: NAD, A/O x 3  ENT: No gross hearing impairment, Moist mucous membranes, no thrush  Neck: Supple, No JVD  Lungs: Clear to auscultation bilaterally, good air entry, non-labored breathing  Cardio: RRR, S1/S2, No murmur  Abdomen: Soft, Nontender, Nondistended; Bowel sounds present  Extremities: No calf tenderness, No cyanosis, No pitting edema  Psych: Appropriate mood and affect    LABS:                        13.3   5.94  )-----------( 230      ( 10 Sep 2024 05:33 )             40.7     09-10    138  |  103  |  19.7  ----------------------------<  84  4.9   |  24.0  |  0.58    Ca    9.1      10 Sep 2024 05:33    TPro  6.1  /  Alb  3.2  /  TBili  0.3  /  DBili  0.1  /  AST  131  /  ALT  269  /  AlkPhos  68  09-10    09-05 Chol 217 mg/dL LDL -- HDL 64 mg/dL Trig 142 mg/dL    Urinalysis Basic - ( 10 Sep 2024 05:33 )    Color: x / Appearance: x / SG: x / pH: x  Gluc: 84 mg/dL / Ketone: x  / Bili: x / Urobili: x   Blood: x / Protein: x / Nitrite: x   Leuk Esterase: x / RBC: x / WBC x   Sq Epi: x / Non Sq Epi: x / Bacteria: x    RADIOLOGY & ADDITIONAL TESTS:    Care Discussed with Consultants/Other Providers:

## 2024-09-10 NOTE — PROGRESS NOTE ADULT - SUBJECTIVE AND OBJECTIVE BOX
[place] [facility] - [facility], THE HEART CENTER                                   [address] [place] [address]                                                      PHONE: [phone]                                                         FAX: [phone]  [email or web address]  ---------------------------------------------------------------------------------------------------------------------------------    Overnight events/patient complaints: feels well, tele unremarkable, LFts improving      Allergy Status Unknown    MEDICATIONS  (STANDING):  chlorhexidine 2% Cloths 1 Application(s) Topical daily  heparin   Injectable 5000 Unit(s) SubCutaneous every 12 hours  lisinopril 2.5 milliGRAM(s) Oral two times a day  polyethylene glycol 3350 17 Gram(s) Oral daily  senna 2 Tablet(s) Oral at bedtime    MEDICATIONS  (PRN):  aluminum hydroxide/magnesium hydroxide/simethicone Suspension 30 milliLiter(s) Oral every 4 hours PRN Dyspepsia  sodium chloride 0.9% lock flush 10 milliLiter(s) IV Push every 1 hour PRN Pre/post blood products, medications, blood draw, and to maintain line patency      Vital Signs Last 24 Hrs  T(C): 36.4 (10 Sep 2024 09:08), Max: 37 (09 Sep 2024 15:47)  T(F): 97.6 (10 Sep 2024 09:08), Max: 98.6 (09 Sep 2024 15:47)  HR: 69 (10 Sep 2024 09:08) (49 - 69)  BP: 122/67 (10 Sep 2024 09:08) (104/58 - 122/67)  BP(mean): --  RR: 18 (10 Sep 2024 09:08) (15 - 18)  SpO2: 98% (10 Sep 2024 09:08) (98% - 98%)    Parameters below as of 10 Sep 2024 09:08  Patient On (Oxygen Delivery Method): room air      Daily     Daily   ICU Vital Signs Last 24 Hrs  NANY HANNAELIZABETH  I&O's Detail    09 Sep 2024 07:01  -  10 Sep 2024 07:00  --------------------------------------------------------  IN:    Oral Fluid: 720 mL  Total IN: 720 mL    OUT:  Total OUT: 0 mL    Total NET: 720 mL      10 Sep 2024 07:  -  10 Sep 2024 10:36  --------------------------------------------------------  IN:    Oral Fluid: 240 mL  Total IN: 240 mL    OUT:  Total OUT: 0 mL    Total NET: 240 mL        I&O's Summary    09 Sep 2024 07:01  -  10 Sep 2024 07:00  --------------------------------------------------------  IN: 720 mL / OUT: 0 mL / NET: 720 mL    10 Sep 2024 07:01  -  10 Sep 2024 10:36  --------------------------------------------------------  IN: 240 mL / OUT: 0 mL / NET: 240 mL      Drug Dosing Weight  NANY RODRIGES      PHYSICAL EXAM:  General: Appears alert and cooperative.  HEENT: Head; normocephalic, atraumatic.  Eyes: Pupils reactive, cornea wnl.  Neck: Supple, no nodes adenopathy, no NVD or carotid bruit or thyromegaly.  CARDIOVASCULAR: Normal S1 and S2, No murmur, rub, gallop or lift.   LUNGS: No rales, rhonchi or wheeze. Normal breath sounds bilaterally.  ABDOMEN: Soft, nontender without mass or organomegaly. bowel sounds normoactive.  EXTREMITIES: No clubbing, cyanosis or edema. Distal pulses wnl.   SKIN: warm and dry with normal turgor.  NEURO: Alert/oriented x 3/normal motor exam. No pathologic reflexes.    PSYCH: normal affect.        LABS:                        13.3   5.94  )-----------( 230      ( 10 Sep 2024 05:33 )             40.7     09-10    138  |  103  |  19.7  ----------------------------<  84  4.9   |  24.0  |  0.58    Ca    9.1      10 Sep 2024 05:33    TPro  6.2<L>  /  Alb  3.4  /  TBili  0.4  /  DBili  x   /  AST  135<H>  /  ALT  300<H>  /  AlkPhos  68      NANY RODRIGES        Urinalysis Basic - ( 10 Sep 2024 05:33 )    Color: x / Appearance: x / SG: x / pH: x  Gluc: 84 mg/dL / Ketone: x  / Bili: x / Urobili: x   Blood: x / Protein: x / Nitrite: x   Leuk Esterase: x / RBC: x / WBC x   Sq Epi: x / Non Sq Epi: x / Bacteria: x        RADIOLOGY & ADDITIONAL STUDIES:< from: Xray Chest 1 View- PORTABLE-Routine (Xray Chest 1 View- PORTABLE-Routine in AM.) (24 @ 05:17) >    IMPRESSION: Left costophrenic angle excluded.  Low lung volumes. Endotracheal tube nasogastric tube have been removed.   Right internal jugular line reidentified in position. No change heart   mediastinum. Grossly clear lungs. No consolidation pneumothorax or   effusion    --- End of Report ---            ALAN GARCIA MD;Attending Radiologist  This document has been electronically signed. Sep  1 2024 12:50PM    < end of copied text >      INTERPRETATION OF TELEMETRY (personally reviewed):    ECG:< from: 12 Lead ECG (24 @ 07:52) >    Diagnosis Line Sinus bradycardia  Right axis deviation  Incomplete right bundle branch block  Septal infarct , age undetermined  Nonspecific ST and T wave abnormality  Abnormal ECG    Confirmed by Kai Brown DO ([id number]) on 9/3/2024 5:38:52 PM    < end of copied text >      ECHO:< from: TTE W or WO Ultrasound Enhancing Agent (24 @ 11:23) >  TRANSTHORACIC ECHOCARDIOGRAM REPORT  ________________________________________________________________________________                                      _______       Pt. Name:       NANY RODRIGES Study Date:    9/3/2024  MRN:            [id number]       YOB: 2000  Accession #:    [id number]        Age:           23 years  Account#:       [id number]       Gender:        F  Heart Rate:                      Height:        65.00 in (165.10 cm)  Rhythm:                          Weight:        140.00 lb (63.50 kg)  Blood Pressure: 98/61 mmHg       BSA/BMI:       1.70 m² / 23.30 kg/m²  ________________________________________________________________________________________  Referring Physician:    [id number] Cuauhtemoc Alicea  Interpreting Physician: Sammy Lucio MD  Primary Sonographer:    Noa Powell    CPT:                ECHO TTE WITH CON COMP W DOPP - .m;DEFINITY ECHO                      CONTRAST PER ML - .m  Indication(s):      Cardiogenic shock - R57.0  Procedure:          Transthoracic echocardiogram with 2-D, M-mode and complete                      spectral and color flow Doppler.  Ordering Location:  [facility]  Admission Status:   Inpatient  Contrast Injection: Verbal consent was obtained for injection of Ultrasonic                      Enhancing Agent following a discussion of risks and                      benefits.                      Endocardial visualization enhanced with 2 ml of Definity                      Ultrasound enhancing agent (Lot#:[id number]).  UEA Reaction:       Patient had no adverse reaction after injection of                      Ultrasound Enhancing Agent.    _______________________________________________________________________________________     CONCLUSIONS:     1. Left ventricular cavity is normal in size. Left ventricular wall thickness is normal. Left ventricular systolic function is severely decreased with an ejection fraction visually estimated at 30 to 35 %.   2. Pulmonary artery systolic pressure could not be estimated.   3. Trace pericardial effusion.    ________________________________________________________________________________________    < end of copied text >      < from: MR Cardiac w/wo IV Cont (24 @ 16:00) >    ACC: [id number] EXAM:  MR CARD MORPH FUNCTION WAW IC   ORDERED BY:   DORYS GOMES     PROCEDURE DATE:  2024          INTERPRETATION:  HISTORY: 23-year-old woman. Cardiac arrest. Cardiogenic   shock. Echocardiogram performed 9/3/2024 demonstrated LVEF of 30-35%.    EXAMINATION: Cardiac MRI without and with Gadolinium.    TECHNIQUE: Cardiac MRI was performed before and after the administration   of intravenous gadolinium.    CONTRAST/COMPLICATIONS:  IV Contrast: Gadavist  9 cc administered   1 cc discarded  Oral Contrast: NONE  Complications: None reported at time of study completion      FINDINGS:    Chambers: The left ventricle is normal in size. There is no left   ventricular hypertrophy/thickening. Wall motion of the left ventricular   mid wall segments is hypokinetic. The wall motion of the left ventricular   basilar and apical wall segments appear preserved.    The left atrium is normal in size.    The right ventricle is normal in size. There is normal right ventricular   wall motion and function.    The right atrium is normal in size.    No immediate resting perfusion defects identified within the left   ventricular myocardium after administration of gadolinium. There is no   convincing evidence of late gadolinium enhancement of the left or right   ventricular myocardium.    LVEDV is 179 mL  LVESV is 85 mL  LVSV is 94 mL  LVEF is 53%    RVEDV is 150 mL  RVESV is 62 mL  RVSV is 88 mL  RVEF is 59%      Valves: No CMR evidence of valvular abnormality.    Vessels: Thoracic aorta is normal in caliber.    Thorax: Small pericardial effusion. Trace left pleural effusion. No large   mediastinal or axillary nodes.    Imaged Abdomen: Unremarkable.    IMPRESSION:.    No convincing evidence of myocardial scarring.    There is hypokinesis of the mid LV wall segments and preserved wall   motion of the LV basilar and apical wall segments.    The calculated left ventricular ejection fraction is 53%.    Small pericardial effusion.    --- End of Report ---             < from: CT Angio Cardiac w/ IV Cont (24 @ 09:18) >    ACC: [id number] EXAM:  CT ANGIO HEART CORONARY IC   ORDERED BY: LINDSAY GREEN     PROCEDURE DATE:  2024          INTERPRETATION:  .  Patient: NANY RODRIGES  : 2000  MRN: [id number]  ACC: [id number]  Order Date: 9/3/2024 9:18 AM  Exam: CT ANGIO HEART CORONARY    INDICATION: Cardiac arrest, syncope rule out anomalous coronary artery.  COMPARISON: CT chest 2024.  TECHNIQUE: Unenhanced prospective and enhanced retrospective images were   obtained of the heart using a Siemens Definition Edge scanner. Images   were obtained after the uneventful administration of nonionic intravenous   contrast.  CONTRAST VOLUME: Omnipaque 350. 80 cc administered. 20 cc discarded.  DOSE: 924 mGy.cm.  QUALITY:  Excellent.    FINDINGS:    CALCIUMSCORE: The calculated Agatston score is 0.    CORONARY: Co-coronary artery dominance. No evidence for anomalous   coronary arteries.    LEFT MAIN: Normal bifurcation into LAD, LCX.    LEFT ANTERIOR DESCENDING: There is one diagonal branch. Distal vessel   wraps around apex.    Proximal: Normal.  Mid: Normal.  Distal: Normal.    First diagonal: Normal.  Second diagonal: Normal.    LEFT CIRCUMFLEX: There is one obtuse marginal branch. Gives rise to the   PLV division    Proximal: Normal.  Mid/Distal: Normal.    First obtuse marginal: Normal.    PLV: Normal.    RIGHT CORONARY ARTERY: Gives rise to PDA.    Proximal: Normal.  Mid: Normal.  Distal: Normal.    Posterior descending: Small caliber vessel.    MORPHOLOGY: The LV is dilated.  Normal end diastolic left ventricular   wall thickness.    VALVES: The aortic valve is trileaflet. The anterior and posterior   leaflets of the mitral valve are normal..    PERICARDIUM: Normal pericardial contour. No pericardial effusion.    AORTA: Normal in diameter.    NONCARDIAC FINDINGS: Mild atelectasis within basilar left lower lobe.   Patchy opacities within the left lung are decreased.      IMPRESSION:    No anomalous coronary artery. No plaque or stenosis.    --- End of Report ---            ARMANDO WARD MD; Attending Radiologist  This document has been electronically signed. Sep  3 2024  9:43AM    < end of copied text >

## 2024-09-11 ENCOUNTER — APPOINTMENT (OUTPATIENT)
Dept: CARDIOTHORACIC SURGERY | Facility: HOSPITAL | Age: 24
End: 2024-09-11
Payer: COMMERCIAL

## 2024-09-11 ENCOUNTER — TRANSCRIPTION ENCOUNTER (OUTPATIENT)
Age: 24
End: 2024-09-11

## 2024-09-11 LAB
BASE EXCESS BLDA CALC-SCNC: 3.6 MMOL/L — HIGH (ref -2–3)
CA-I BLDA-SCNC: 1.2 MMOL/L — SIGNIFICANT CHANGE UP (ref 1.15–1.33)
CHLORIDE BLDA-SCNC: 102 MMOL/L — SIGNIFICANT CHANGE UP (ref 96–108)
COHGB MFR BLDA: 1.4 % — SIGNIFICANT CHANGE UP
GLUCOSE BLDA-MCNC: 91 MG/DL — SIGNIFICANT CHANGE UP (ref 70–99)
HCG UR QL: NEGATIVE — SIGNIFICANT CHANGE UP
HCO3 BLDA-SCNC: 28 MMOL/L — SIGNIFICANT CHANGE UP (ref 21–28)
HCT VFR BLDA CALC: 40 % — SIGNIFICANT CHANGE UP
HGB BLDA-MCNC: 13.3 G/DL — SIGNIFICANT CHANGE UP (ref 11.7–16.1)
LACTATE BLDA-MCNC: 1.1 MMOL/L — SIGNIFICANT CHANGE UP (ref 0.5–2)
METHGB MFR BLDA: 0.8 % — SIGNIFICANT CHANGE UP
OXYHGB MFR BLDA: 98 % — HIGH (ref 90–95)
PCO2 BLDA: 41 MMHG — SIGNIFICANT CHANGE UP (ref 32–45)
PH BLDA: 7.44 — SIGNIFICANT CHANGE UP (ref 7.35–7.45)
PO2 BLDA: 480 MMHG — HIGH (ref 83–108)
POTASSIUM BLDA-SCNC: 4.1 MMOL/L — SIGNIFICANT CHANGE UP (ref 3.5–5.1)
SAO2 % BLDA: 100 % — HIGH (ref 94–98)
SODIUM BLDA-SCNC: 130 MMOL/L — LOW (ref 136–145)

## 2024-09-11 PROCEDURE — 71045 X-RAY EXAM CHEST 1 VIEW: CPT | Mod: 26

## 2024-09-11 PROCEDURE — ZZZZZ: CPT

## 2024-09-11 PROCEDURE — 93010 ELECTROCARDIOGRAM REPORT: CPT

## 2024-09-11 PROCEDURE — 99232 SBSQ HOSP IP/OBS MODERATE 35: CPT

## 2024-09-11 DEVICE — IMPLANTABLE DEVICE: Type: IMPLANTABLE DEVICE | Status: FUNCTIONAL

## 2024-09-11 DEVICE — KIT A-LINE 1LUM 20G X 12CM SAFE KIT: Type: IMPLANTABLE DEVICE | Status: FUNCTIONAL

## 2024-09-11 RX ORDER — CEFAZOLIN SODIUM 2 G/100ML
2000 INJECTION, SOLUTION INTRAVENOUS EVERY 8 HOURS
Refills: 0 | Status: COMPLETED | OUTPATIENT
Start: 2024-09-11 | End: 2024-09-12

## 2024-09-11 RX ORDER — ONDANSETRON 2 MG/ML
4 INJECTION, SOLUTION INTRAMUSCULAR; INTRAVENOUS EVERY 8 HOURS
Refills: 0 | Status: DISCONTINUED | OUTPATIENT
Start: 2024-09-11 | End: 2024-09-12

## 2024-09-11 RX ORDER — ACETAMINOPHEN 325 MG/1
650 TABLET ORAL EVERY 6 HOURS
Refills: 0 | Status: DISCONTINUED | OUTPATIENT
Start: 2024-09-11 | End: 2024-09-12

## 2024-09-11 RX ORDER — HYDROMORPHONE HYDROCHLORIDE 2 MG/1
0.5 TABLET ORAL
Refills: 0 | Status: DISCONTINUED | OUTPATIENT
Start: 2024-09-11 | End: 2024-09-11

## 2024-09-11 RX ORDER — ONDANSETRON 2 MG/ML
4 INJECTION, SOLUTION INTRAMUSCULAR; INTRAVENOUS ONCE
Refills: 0 | Status: DISCONTINUED | OUTPATIENT
Start: 2024-09-11 | End: 2024-09-11

## 2024-09-11 RX ORDER — CEFAZOLIN SODIUM 2 G/100ML
2000 INJECTION, SOLUTION INTRAVENOUS EVERY 8 HOURS
Refills: 0 | Status: DISCONTINUED | OUTPATIENT
Start: 2024-09-11 | End: 2024-09-11

## 2024-09-11 RX ORDER — FENTANYL CITRATE 50 UG/ML
25 INJECTION INTRAMUSCULAR; INTRAVENOUS
Refills: 0 | Status: DISCONTINUED | OUTPATIENT
Start: 2024-09-11 | End: 2024-09-11

## 2024-09-11 RX ORDER — OXYCODONE HYDROCHLORIDE 5 MG/1
5 TABLET ORAL EVERY 6 HOURS
Refills: 0 | Status: DISCONTINUED | OUTPATIENT
Start: 2024-09-11 | End: 2024-09-12

## 2024-09-11 RX ADMIN — ACETAMINOPHEN 650 MILLIGRAM(S): 325 TABLET ORAL at 23:20

## 2024-09-11 RX ADMIN — CEFAZOLIN SODIUM 2000 MILLIGRAM(S): 2 INJECTION, SOLUTION INTRAVENOUS at 18:37

## 2024-09-11 RX ADMIN — OXYCODONE HYDROCHLORIDE 5 MILLIGRAM(S): 5 TABLET ORAL at 17:01

## 2024-09-11 RX ADMIN — ONDANSETRON 4 MILLIGRAM(S): 2 INJECTION, SOLUTION INTRAMUSCULAR; INTRAVENOUS at 18:37

## 2024-09-11 RX ADMIN — LISINOPRIL 2.5 MILLIGRAM(S): 10 TABLET ORAL at 06:08

## 2024-09-11 RX ADMIN — OXYCODONE HYDROCHLORIDE 5 MILLIGRAM(S): 5 TABLET ORAL at 16:01

## 2024-09-11 NOTE — PROGRESS NOTE ADULT - ASSESSMENT
22 y/o F with a h/o multiple syncopal episodes getting workup outpatient admitted for PEA/VT cardiac arrest, s/p ROSC after 3 rounds of CPR, intubated and admitted to the ICU s/p with cardiogenic shock and acute systolic heart failure. She was started on dobutamine and pressors, now both weaned off. TTE with reduced EF 25-30%.   Course further c/b acute resp failure wiht hypoxia suspect 2/2 asp and cardaic arrest, on Zosyn. extubated and stable on RA, transferred to medicine on 9/3. Patient had cardiac mri showing no scar tissue and improved ef. Patient is now planned for leadless ICD as per EP on monday.     s/p PEA/VT cardiac arrest  Brugada Syndrome  Cardiogenic shock s/p dobutamine drip  Acute systolic heart failure  - s/p cardiac arrest/ICU/dobutamine  - now stable  - for ICD placement today  - TTE appreciated  - Cardiac MRI performed, shows no scarring    Acute hypoxemic respiratory failure  Aspiration pneumonia  - RESOLVED  - CT noted with asp pneumonitis   - extubated 8/30 and now on RA  - s/p zosyn     hld - diet and exercise  total chol 217    Dispo - icd today, likely d/c tomorrow    likely needs outpatient genetic testing given cardiac arrest / Brugada

## 2024-09-11 NOTE — PROGRESS NOTE ADULT - SUBJECTIVE AND OBJECTIVE BOX
Dc Weinberg MD  [facility]  Contact via Teams or text/call at [phone]    Patient is a 23y old  Female who presents with a chief complaint of Cardiac Arrest (10 Sep 2024 12:32)    Feels good.     Patient seen and examined at bedside. No overnight events reported.     ALLERGIES:  Allergy Status Unknown    MEDICATIONS  (STANDING):    MEDICATIONS  (PRN):    Vital Signs Last 24 Hrs  T(F): 98 (11 Sep 2024 10:44), Max: 98.1 (11 Sep 2024 00:00)  HR: 60 (11 Sep 2024 10:44) (46 - 70)  BP: 90/67 (11 Sep 2024 10:44) (90/67 - 125/73)  RR: 16 (11 Sep 2024 10:44) (16 - 18)  SpO2: 100% (11 Sep 2024 10:44) (97% - 100%)  I&O's Summary    10 Sep 2024 07:01  -  11 Sep 2024 07:00  --------------------------------------------------------  IN: 720 mL / OUT: 0 mL / NET: 720 mL      PHYSICAL EXAM:  General: NAD, A/O x 3  ENT: No gross hearing impairment, Moist mucous membranes, no thrush  Neck: Supple, No JVD  Lungs: Clear to auscultation bilaterally, good air entry, non-labored breathing  Cardio: RRR, S1/S2, No murmur  Abdomen: Soft, Nontender, Nondistended; Bowel sounds present  Extremities: No calf tenderness, No cyanosis, No pitting edema  Psych: Appropriate mood and affect    LABS:                        13.3   5.94  )-----------( 230      ( 10 Sep 2024 05:33 )             40.7     09-10    138  |  103  |  19.7  ----------------------------<  84  4.9   |  24.0  |  0.58    Ca    9.1      10 Sep 2024 05:33    TPro  6.1  /  Alb  3.2  /  TBili  0.3  /  DBili  0.1  /  AST  131  /  ALT  269  /  AlkPhos  68  09-10    09-05 Chol 217 mg/dL LDL -- HDL 64 mg/dL Trig 142 mg/dL    ABG - ( 11 Sep 2024 11:07 )  pH, Arterial: 7.440 pH, Blood: x     /  pCO2: 41    /  pO2: 480   / HCO3: 28    / Base Excess: 3.6   /  SaO2: 100.0     Urinalysis Basic - ( 10 Sep 2024 05:33 )    Color: x / Appearance: x / SG: x / pH: x  Gluc: 84 mg/dL / Ketone: x  / Bili: x / Urobili: x   Blood: x / Protein: x / Nitrite: x   Leuk Esterase: x / RBC: x / WBC x   Sq Epi: x / Non Sq Epi: x / Bacteria: x          RADIOLOGY & ADDITIONAL TESTS:    Care Discussed with Consultants/Other Providers:

## 2024-09-11 NOTE — PROGRESS NOTE ADULT - SUBJECTIVE AND OBJECTIVE BOX
PROCEDURE(S): MDT EV-ICD Implant    ELECTRPHYSIOLOGIST(S): MD Rosalia    COMPLICATIONS:  none          DISPOSITION:  Observation Unit           CONDITION: Stable    EBL: <15mL    Pt doing well s/p EV-ICD implant with Dr Lindsey. Denies complaint    Exam:   T(C): 36.6 (09-11-24 @ 14:05), Max: 36.7 (09-11-24 @ 00:00)  HR: 52 (09-11-24 @ 14:20) (46 - 70)  BP: 106/48 (09-11-24 @ 14:20) (90/67 - 125/73)  RR: 17 (09-11-24 @ 14:20) (15 - 18)  SpO2: 100% (09-11-24 @ 14:20) (97% - 100%)      Neuro: Still drowsy from anesthesia, BEAVER, FC  Incision: Dressing C/D/I; no bleeding, hematoma, erythema or edema  Card: S1/S2, RRR, no m/g/r  Resp: lungs CTA b/l  Abd: S/NT/ND  Ext: no edema    Post-procedure VS  HR 50  / 48  SpO2 100% on RA    EKG: Pending    Assessment:   23y Female with prior h/o near syncope and 1 episode of syncope s/p cardiac arrest while running. Patient was found PEA, 3 rounds of CPR, 1 epi in the field, found to be in VTach and defibrillated. Upon arrival to ED, sinus bradycardia in 30s, atropine was given with improvement in HR, patient intubated for airway protection.  Pt has since been extubated and upon re-evaluation of prior exercise stress test, Type 1 Brugada was found. Patient now status post uncomplicated EV- ICD insertion for secondary prevention.      Plan:   Port CXR now - r/o PTX or effusion, verify lead locations.   Bedrest x 4 hours post implant, then OOB w/ assist & progress as tolerated.    Continued observation on telemetry overnight.   Cont Ancef 2gm IV q 8 hours x 2 additional doses to complete 24 hour course.   Pain control with PO analgesia PRN. Ice packs prn x 24 hours  NO HEPARIN OR LOVENOX, INCLUDING PROPHYLACTIC/SUBCUT DOSING, UNTIL OTHERWISE ADVISED BY EP.   PA/Lat CXR and device check in AM.   Will f/u PA/Lat CXR and device check tomorrow.  Further care and management as per primary team and [place] [facility]          PROCEDURE(S): MDT EV-ICD Implant    ELECTRPHYSIOLOGIST(S): MD Rosalia    COMPLICATIONS:  none          DISPOSITION:  Observation Unit           CONDITION: Stable    EBL: <15mL    Pt doing well s/p EV-ICD implant with Dr Lindsey. Denies complaint    Exam:   T(C): 36.6 (09-11-24 @ 14:05), Max: 36.7 (09-11-24 @ 00:00)  HR: 52 (09-11-24 @ 14:20) (46 - 70)  BP: 106/48 (09-11-24 @ 14:20) (90/67 - 125/73)  RR: 17 (09-11-24 @ 14:20) (15 - 18)  SpO2: 100% (09-11-24 @ 14:20) (97% - 100%)      Neuro: Still drowsy from anesthesia, BEAVER, FC  Incision: Dressing C/D/I; no bleeding, hematoma, erythema or edema  Card: S1/S2, RRR, no m/g/r  Resp: lungs CTA b/l  Abd: S/NT/ND  Ext: no edema    Post-procedure VS  HR 50  / 48  SpO2 100% on RA    EKG: Pending    Device Settings:  OVO  VT zone 200bpm ATP x1 followed by 40j  VF zone 222bpm 40j    Assessment:   23y Female with prior h/o near syncope and 1 episode of syncope s/p cardiac arrest while running. Patient was found PEA, 3 rounds of CPR, 1 epi in the field, found to be in VTach and defibrillated. Upon arrival to ED, sinus bradycardia in 30s, atropine was given with improvement in HR, patient intubated for airway protection.  Pt has since been extubated and upon re-evaluation of prior exercise stress test, Type 1 Brugada was found. Patient now status post uncomplicated EV- ICD insertion for secondary prevention.      Plan:   Port CXR now - r/o PTX or effusion, verify lead locations.   Bedrest x 4 hours post implant, then OOB w/ assist & progress as tolerated.    Continued observation on telemetry overnight.   Cont Ancef 2gm IV q 8 hours x 2 additional doses to complete 24 hour course.   Pain control with PO analgesia PRN. Ice packs prn x 24 hours  NO HEPARIN OR LOVENOX, INCLUDING PROPHYLACTIC/SUBCUT DOSING, UNTIL OTHERWISE ADVISED BY EP.   PA/Lat CXR and device check in AM.   Will f/u PA/Lat CXR and device check tomorrow.  Further care and management as per primary team and [place] [facility]          PROCEDURE(S): MDT EV-ICD Implant    ELECTRPHYSIOLOGIST(S): MD Rosalia    COMPLICATIONS:  none          DISPOSITION:  Observation Unit           CONDITION: Stable    EBL: <15mL    Pt doing well s/p EV-ICD implant with Dr Lindsey. Denies complaint    Exam:   T(C): 36.6 (09-11-24 @ 14:05), Max: 36.7 (09-11-24 @ 00:00)  HR: 52 (09-11-24 @ 14:20) (46 - 70)  BP: 106/48 (09-11-24 @ 14:20) (90/67 - 125/73)  RR: 17 (09-11-24 @ 14:20) (15 - 18)  SpO2: 100% (09-11-24 @ 14:20) (97% - 100%)      Neuro: Still drowsy from anesthesia, BEAVER, FC  Incision: Dressing C/D/I; no bleeding, hematoma, erythema or edema  Card: S1/S2, RRR, no m/g/r  Resp: lungs CTA b/l  Abd: S/NT/ND  Ext: no edema    Post-procedure VS  HR 50  / 48  SpO2 100% on RA    EKG: Pending    Device Settings:  OVO  VT zone 200bpm ATP x1 followed by 40j  VF zone 222bpm 40j    Assessment:   23y Female with prior h/o near syncope and 1 episode of syncope s/p cardiac arrest while running. Patient was found PEA, 3 rounds of CPR, 1 epi in the field, found to be in VTach and defibrillated. Upon arrival to ED, sinus bradycardia in 30s, atropine was given with improvement in HR, patient intubated for airway protection.  Pt has since been extubated and upon re-evaluation of prior exercise stress test, Type 1 Brugada was found. Patient now status post uncomplicated EV- ICD insertion for secondary prevention.      Plan:   Port CXR now - r/o PTX or effusion, verify lead locations.   Bedrest x 4 hours post implant, then OOB w/ assist & progress as tolerated.    Continued observation on telemetry overnight.   Cont Ancef 2gm IV q 8 hours x 2 additional doses to complete 24 hour course.   Pain control with PO analgesia PRN. Ice packs prn x 24 hours  NO HEPARIN OR LOVENOX, INCLUDING PROPHYLACTIC/SUBCUT DOSING, UNTIL OTHERWISE ADVISED BY EP.   PA/Lat CXR and device check in AM.   Will f/u PA/Lat CXR and device check tomorrow.  Further care and management as per primary team and [place] [facility] Team.

## 2024-09-11 NOTE — DISCHARGE NOTE PROVIDER - CARE PROVIDER_API CALL
Lata Lindsey Mo  Cardiac Electrophysiology  [address] [place] [id number]  Phone: [phone]  Follow Up Time:

## 2024-09-11 NOTE — DISCHARGE NOTE PROVIDER - NSDCCPTREATMENT_GEN_ALL_CORE_FT
PRINCIPAL PROCEDURE  Procedure: Insertion, extravascular ICD  Findings and Treatment: Cardiac Device Implant Post Operative Instructions  - Do not touch the incision until it is completely healed.   - There are Steristrips (white strips of tape) on your incision, which will start to peel off on their own over the next 2-3 weeks. Do not pick at or peel off the Steristrips.   - Bruising around the implant site or over the chest, side or arm near the incision is normal, and will take a few weeks to resolve.  -Do not lift the affected arm higher than 90 degrees (shoulder height) in any direction for 6 weeks.   - Do not push, pull or lift anything heavier than 10 lbs (about a gallon of milk) with the affected arm for 6 weeks.     - Do not apply soaps, creams, lotions, ointments or powders to the incision until it is completely healed.  - You may take a shower in 24 hours, and allow the water to run over the incision. However, do not submerge the incision in water: do not swim or soak in bath tubs, hot tubs, swimming pools, etc.   You should call the doctor if:   - You notice redness, drainage, swelling, increased tenderness, hot sensation around the incision, bleeding or incision edges pulling apart.  - Your temperature is greater than 100 degrees F for more than 24 hours.  - You notice swelling or bulging at the incision or around the device that was not there when you left the hospital or is increasing in size.  - You experience increased difficulty breathing.  - You notice new/worsening swelling in your legs and ankles.  - You faint or have dizzy spells.  - You have any questions or concerns regarding your device or the procedure.

## 2024-09-11 NOTE — DISCHARGE NOTE PROVIDER - HOSPITAL COURSE
Hospital Course  HPI:  23 year old PMHx multiple syncopal episodes during strenuous exercise presents with witnessed syncopal episode/cardiac arrest. Patient was running with her boyfriend, on mile 8 patient sat down on a bench and syncopated. Patients boyfriend states patient was unresponsive, tried elevating legs with no improvement, states unsure if patient had a pulse. Called EMS, who arrived to the scene 5 mins, boyfriend did not provide CPR while awaiting EMS. Patient was found PEA, 3 rounds of CPR, 1 epi in the field, found to be in VTach and defibrillated. Upon arrival to ED, sinus bradycardia in 30s, atropine was given with improvement in HR, patient intubated for airway protection.    Of note, patient with multiple syncopal episodes during exertion such a running a 5K. Patient is an avid running and frequently runs more than 8-10 miles. Follows with Dr. Soliman, [facility], syncopal work up included 1 month with mobile cardiac monitor, TTE in April 2023, found to be unremarkable.  (29 Aug 2024 13:42)    You were admitted for syncope and cardiac arrest (PEA) s/p 3 rounds CPR in field found to be in Vtach s/p defibrillation with ROSC.  Patient found to have suspected Brugada and had a leadless ICD placed by EP.   Patient medically stable for discharge.     You will need to follow up with your primary care physician and cardiologist.      Discharging Provider:  Dc Weinberg MD  Contact Info: [phone] - Please call with any questions or concerns.

## 2024-09-11 NOTE — DISCHARGE NOTE PROVIDER - NSDCCPCAREPLAN_GEN_ALL_CORE_FT
PRINCIPAL DISCHARGE DIAGNOSIS  Diagnosis: Sudden cardiac arrest  Assessment and Plan of Treatment:   You were admitted for syncope and cardiac arrest (PEA) s/p 3 rounds CPR in field found to be in Vtach s/p defibrillation with ROSC.  Patient found to have suspected Brugada and had a leadless ICD placed by EP.   Patient medically stable for discharge.   You will need to follow up with your primary care physician and cardiologist.

## 2024-09-12 ENCOUNTER — TRANSCRIPTION ENCOUNTER (OUTPATIENT)
Age: 24
End: 2024-09-12

## 2024-09-12 VITALS
SYSTOLIC BLOOD PRESSURE: 102 MMHG | RESPIRATION RATE: 16 BRPM | DIASTOLIC BLOOD PRESSURE: 60 MMHG | OXYGEN SATURATION: 100 % | HEART RATE: 61 BPM

## 2024-09-12 LAB
ANION GAP SERPL CALC-SCNC: 10 MMOL/L — SIGNIFICANT CHANGE UP (ref 5–17)
BASOPHILS # BLD AUTO: 0.03 K/UL — SIGNIFICANT CHANGE UP (ref 0–0.2)
BASOPHILS NFR BLD AUTO: 0.3 % — SIGNIFICANT CHANGE UP (ref 0–2)
BUN SERPL-MCNC: 10 MG/DL — SIGNIFICANT CHANGE UP (ref 8–20)
CALCIUM SERPL-MCNC: 8.7 MG/DL — SIGNIFICANT CHANGE UP (ref 8.4–10.5)
CHLORIDE SERPL-SCNC: 100 MMOL/L — SIGNIFICANT CHANGE UP (ref 96–108)
CO2 SERPL-SCNC: 27 MMOL/L — SIGNIFICANT CHANGE UP (ref 22–29)
CREAT SERPL-MCNC: 0.57 MG/DL — SIGNIFICANT CHANGE UP (ref 0.5–1.3)
EGFR: 132 ML/MIN/1.73M2 — SIGNIFICANT CHANGE UP
EOSINOPHIL # BLD AUTO: 0.02 K/UL — SIGNIFICANT CHANGE UP (ref 0–0.5)
EOSINOPHIL NFR BLD AUTO: 0.2 % — SIGNIFICANT CHANGE UP (ref 0–6)
GLUCOSE SERPL-MCNC: 92 MG/DL — SIGNIFICANT CHANGE UP (ref 70–99)
HCT VFR BLD CALC: 39.1 % — SIGNIFICANT CHANGE UP (ref 34.5–45)
HGB BLD-MCNC: 12.8 G/DL — SIGNIFICANT CHANGE UP (ref 11.5–15.5)
IMM GRANULOCYTES NFR BLD AUTO: 1 % — HIGH (ref 0–0.9)
LYMPHOCYTES # BLD AUTO: 17.7 % — SIGNIFICANT CHANGE UP (ref 13–44)
LYMPHOCYTES # BLD AUTO: 2.04 K/UL — SIGNIFICANT CHANGE UP (ref 1–3.3)
MCHC RBC-ENTMCNC: 31.4 PG — SIGNIFICANT CHANGE UP (ref 27–34)
MCHC RBC-ENTMCNC: 32.7 GM/DL — SIGNIFICANT CHANGE UP (ref 32–36)
MCV RBC AUTO: 96.1 FL — SIGNIFICANT CHANGE UP (ref 80–100)
MONOCYTES # BLD AUTO: 0.94 K/UL — HIGH (ref 0–0.9)
MONOCYTES NFR BLD AUTO: 8.1 % — SIGNIFICANT CHANGE UP (ref 2–14)
NEUTROPHILS # BLD AUTO: 8.4 K/UL — HIGH (ref 1.8–7.4)
NEUTROPHILS NFR BLD AUTO: 72.7 % — SIGNIFICANT CHANGE UP (ref 43–77)
PLATELET # BLD AUTO: 236 K/UL — SIGNIFICANT CHANGE UP (ref 150–400)
POTASSIUM SERPL-MCNC: 4 MMOL/L — SIGNIFICANT CHANGE UP (ref 3.5–5.3)
POTASSIUM SERPL-SCNC: 4 MMOL/L — SIGNIFICANT CHANGE UP (ref 3.5–5.3)
RBC # BLD: 4.07 M/UL — SIGNIFICANT CHANGE UP (ref 3.8–5.2)
RBC # FLD: 13.3 % — SIGNIFICANT CHANGE UP (ref 10.3–14.5)
SODIUM SERPL-SCNC: 137 MMOL/L — SIGNIFICANT CHANGE UP (ref 135–145)
WBC # BLD: 11.54 K/UL — HIGH (ref 3.8–10.5)
WBC # FLD AUTO: 11.54 K/UL — HIGH (ref 3.8–10.5)

## 2024-09-12 PROCEDURE — 82962 GLUCOSE BLOOD TEST: CPT

## 2024-09-12 PROCEDURE — 87641 MR-STAPH DNA AMP PROBE: CPT

## 2024-09-12 PROCEDURE — 76000 FLUOROSCOPY <1 HR PHYS/QHP: CPT

## 2024-09-12 PROCEDURE — 71275 CT ANGIOGRAPHY CHEST: CPT | Mod: MC

## 2024-09-12 PROCEDURE — 86225 DNA ANTIBODY NATIVE: CPT

## 2024-09-12 PROCEDURE — C1894: CPT

## 2024-09-12 PROCEDURE — 82803 BLOOD GASES ANY COMBINATION: CPT

## 2024-09-12 PROCEDURE — 85730 THROMBOPLASTIN TIME PARTIAL: CPT

## 2024-09-12 PROCEDURE — 82330 ASSAY OF CALCIUM: CPT

## 2024-09-12 PROCEDURE — 85018 HEMOGLOBIN: CPT

## 2024-09-12 PROCEDURE — 87640 STAPH A DNA AMP PROBE: CPT

## 2024-09-12 PROCEDURE — 74177 CT ABD & PELVIS W/CONTRAST: CPT | Mod: MC

## 2024-09-12 PROCEDURE — 93010 ELECTROCARDIOGRAM REPORT: CPT

## 2024-09-12 PROCEDURE — C1722: CPT

## 2024-09-12 PROCEDURE — 84132 ASSAY OF SERUM POTASSIUM: CPT

## 2024-09-12 PROCEDURE — 72125 CT NECK SPINE W/O DYE: CPT | Mod: MC

## 2024-09-12 PROCEDURE — 84145 PROCALCITONIN (PCT): CPT

## 2024-09-12 PROCEDURE — C9399: CPT

## 2024-09-12 PROCEDURE — 86901 BLOOD TYPING SEROLOGIC RH(D): CPT

## 2024-09-12 PROCEDURE — 85652 RBC SED RATE AUTOMATED: CPT

## 2024-09-12 PROCEDURE — 95700 EEG CONT REC W/VID EEG TECH: CPT

## 2024-09-12 PROCEDURE — C1769: CPT

## 2024-09-12 PROCEDURE — 87040 BLOOD CULTURE FOR BACTERIA: CPT

## 2024-09-12 PROCEDURE — 85014 HEMATOCRIT: CPT

## 2024-09-12 PROCEDURE — 94002 VENT MGMT INPAT INIT DAY: CPT

## 2024-09-12 PROCEDURE — 84443 ASSAY THYROID STIM HORMONE: CPT

## 2024-09-12 PROCEDURE — 84481 FREE ASSAY (FT-3): CPT

## 2024-09-12 PROCEDURE — 82435 ASSAY OF BLOOD CHLORIDE: CPT

## 2024-09-12 PROCEDURE — 99291 CRITICAL CARE FIRST HOUR: CPT

## 2024-09-12 PROCEDURE — 94003 VENT MGMT INPAT SUBQ DAY: CPT

## 2024-09-12 PROCEDURE — 0225U NFCT DS DNA&RNA 21 SARSCOV2: CPT

## 2024-09-12 PROCEDURE — 86038 ANTINUCLEAR ANTIBODIES: CPT

## 2024-09-12 PROCEDURE — 76700 US EXAM ABDOM COMPLETE: CPT

## 2024-09-12 PROCEDURE — 84484 ASSAY OF TROPONIN QUANT: CPT

## 2024-09-12 PROCEDURE — 95711 VEEG 2-12 HR UNMONITORED: CPT

## 2024-09-12 PROCEDURE — 87637 SARSCOV2&INF A&B&RSV AMP PRB: CPT

## 2024-09-12 PROCEDURE — 82164 ANGIOTENSIN I ENZYME TEST: CPT

## 2024-09-12 PROCEDURE — 82550 ASSAY OF CK (CPK): CPT

## 2024-09-12 PROCEDURE — 84295 ASSAY OF SERUM SODIUM: CPT

## 2024-09-12 PROCEDURE — 36592 COLLECT BLOOD FROM PICC: CPT

## 2024-09-12 PROCEDURE — 86753 PROTOZOA ANTIBODY NOS: CPT

## 2024-09-12 PROCEDURE — 80076 HEPATIC FUNCTION PANEL: CPT

## 2024-09-12 PROCEDURE — 82553 CREATINE MB FRACTION: CPT

## 2024-09-12 PROCEDURE — 84436 ASSAY OF TOTAL THYROXINE: CPT

## 2024-09-12 PROCEDURE — 80048 BASIC METABOLIC PNL TOTAL CA: CPT

## 2024-09-12 PROCEDURE — 85027 COMPLETE CBC AUTOMATED: CPT

## 2024-09-12 PROCEDURE — 94640 AIRWAY INHALATION TREATMENT: CPT

## 2024-09-12 PROCEDURE — 85025 COMPLETE CBC W/AUTO DIFF WBC: CPT

## 2024-09-12 PROCEDURE — 86900 BLOOD TYPING SEROLOGIC ABO: CPT

## 2024-09-12 PROCEDURE — 82947 ASSAY GLUCOSE BLOOD QUANT: CPT

## 2024-09-12 PROCEDURE — 71046 X-RAY EXAM CHEST 2 VIEWS: CPT | Mod: 26

## 2024-09-12 PROCEDURE — C1876: CPT

## 2024-09-12 PROCEDURE — 84702 CHORIONIC GONADOTROPIN TEST: CPT

## 2024-09-12 PROCEDURE — C8929: CPT

## 2024-09-12 PROCEDURE — 86140 C-REACTIVE PROTEIN: CPT

## 2024-09-12 PROCEDURE — 75574 CT ANGIO HRT W/3D IMAGE: CPT | Mod: MC

## 2024-09-12 PROCEDURE — 81001 URINALYSIS AUTO W/SCOPE: CPT

## 2024-09-12 PROCEDURE — 86891 AUTOLOGOUS BLOOD OP SALVAGE: CPT

## 2024-09-12 PROCEDURE — 95714 VEEG EA 12-26 HR UNMNTR: CPT

## 2024-09-12 PROCEDURE — 94760 N-INVAS EAR/PLS OXIMETRY 1: CPT

## 2024-09-12 PROCEDURE — 86618 LYME DISEASE ANTIBODY: CPT

## 2024-09-12 PROCEDURE — 75561 CARDIAC MRI FOR MORPH W/DYE: CPT

## 2024-09-12 PROCEDURE — 99239 HOSP IP/OBS DSCHRG MGMT >30: CPT

## 2024-09-12 PROCEDURE — 71045 X-RAY EXAM CHEST 1 VIEW: CPT

## 2024-09-12 PROCEDURE — 80061 LIPID PANEL: CPT

## 2024-09-12 PROCEDURE — 36415 COLL VENOUS BLD VENIPUNCTURE: CPT

## 2024-09-12 PROCEDURE — 83605 ASSAY OF LACTIC ACID: CPT

## 2024-09-12 PROCEDURE — 80053 COMPREHEN METABOLIC PANEL: CPT

## 2024-09-12 PROCEDURE — 86923 COMPATIBILITY TEST ELECTRIC: CPT

## 2024-09-12 PROCEDURE — C8924: CPT

## 2024-09-12 PROCEDURE — 96374 THER/PROPH/DIAG INJ IV PUSH: CPT

## 2024-09-12 PROCEDURE — 81025 URINE PREGNANCY TEST: CPT

## 2024-09-12 PROCEDURE — 87799 DETECT AGENT NOS DNA QUANT: CPT

## 2024-09-12 PROCEDURE — 83880 ASSAY OF NATRIURETIC PEPTIDE: CPT

## 2024-09-12 PROCEDURE — 83036 HEMOGLOBIN GLYCOSYLATED A1C: CPT

## 2024-09-12 PROCEDURE — 83735 ASSAY OF MAGNESIUM: CPT

## 2024-09-12 PROCEDURE — 84439 ASSAY OF FREE THYROXINE: CPT

## 2024-09-12 PROCEDURE — 84100 ASSAY OF PHOSPHORUS: CPT

## 2024-09-12 PROCEDURE — 80074 ACUTE HEPATITIS PANEL: CPT

## 2024-09-12 PROCEDURE — 86850 RBC ANTIBODY SCREEN: CPT

## 2024-09-12 PROCEDURE — 80307 DRUG TEST PRSMV CHEM ANLYZR: CPT

## 2024-09-12 PROCEDURE — 85610 PROTHROMBIN TIME: CPT

## 2024-09-12 PROCEDURE — 71046 X-RAY EXAM CHEST 2 VIEWS: CPT

## 2024-09-12 PROCEDURE — 70450 CT HEAD/BRAIN W/O DYE: CPT | Mod: MC

## 2024-09-12 PROCEDURE — 93005 ELECTROCARDIOGRAM TRACING: CPT

## 2024-09-12 PROCEDURE — 96375 TX/PRO/DX INJ NEW DRUG ADDON: CPT

## 2024-09-12 PROCEDURE — 86666 EHRLICHIA ANTIBODY: CPT

## 2024-09-12 RX ADMIN — ACETAMINOPHEN 650 MILLIGRAM(S): 325 TABLET ORAL at 00:20

## 2024-09-12 RX ADMIN — CEFAZOLIN SODIUM 2000 MILLIGRAM(S): 2 INJECTION, SOLUTION INTRAVENOUS at 05:24

## 2024-09-12 RX ADMIN — ACETAMINOPHEN 650 MILLIGRAM(S): 325 TABLET ORAL at 06:30

## 2024-09-12 RX ADMIN — ONDANSETRON 4 MILLIGRAM(S): 2 INJECTION, SOLUTION INTRAMUSCULAR; INTRAVENOUS at 08:19

## 2024-09-12 RX ADMIN — ACETAMINOPHEN 650 MILLIGRAM(S): 325 TABLET ORAL at 05:24

## 2024-09-12 RX ADMIN — ACETAMINOPHEN 650 MILLIGRAM(S): 325 TABLET ORAL at 12:23

## 2024-09-12 NOTE — DISCHARGE NOTE NURSING/CASE MANAGEMENT/SOCIAL WORK - PATIENT PORTAL LINK FT
You can access the FollowMyHealth Patient Portal offered by [facility] by registering at the following website: [email or web address]. By joining [employer]’s FollowMyHealth portal, you will also be able to view your health information using other applications (apps) compatible with our system.

## 2024-09-12 NOTE — PROGRESS NOTE ADULT - PROVIDER SPECIALTY LIST ADULT
Cardiology
Critical Care
Electrophysiology
Hospitalist
Hospitalist
Internal Medicine
MICU
Cardiology
Cardiology
Critical Care
Electrophysiology
Electrophysiology
Hospitalist
Internal Medicine
MICU
MICU
Cardiology
Electrophysiology
Hospitalist
Internal Medicine
Cardiology
Cardiology
Hospitalist
Hospitalist
MICU
CT Surgery
CT Surgery

## 2024-09-12 NOTE — PROGRESS NOTE ADULT - ASSESSMENT
22 y/o F with a h/o multiple syncopal episodes getting workup outpatient admitted for PEA/VT cardiac arrest, s/p ROSC after 3 rounds of CPR, intubated and admitted to the ICU s/p with cardiogenic shock and acute systolic heart failure. She was started on dobutamine and pressors, now both weaned off. TTE with reduced EF 25-30%.   Course further c/b acute resp failure wiht hypoxia suspect 2/2 asp and cardaic arrest, on Zosyn. extubated and stable on RA, transferred to medicine on 9/3. Patient had cardiac mri showing no scar tissue and improved ef. Patient is now planned for leadless ICD as per EP on monday.     s/p PEA/VT cardiac arrest  Brugada Syndrome  Cardiogenic shock s/p dobutamine drip  Acute systolic heart failure  - s/p cardiac arrest/ICU/dobutamine  - s/p leadless ICD placement 9/11/24  - okay for d/c   - follow up cardiology as outpatient     Acute hypoxemic respiratory failure  Aspiration pneumonia  - RESOLVED  - CT noted with asp pneumonitis   - extubated 8/30 and now on RA  - s/p zosyn     hld - diet and exercise  total chol 217    Dispo - d/c home today    likely needs outpatient genetic testing given cardiac arrest / Brugada

## 2024-09-12 NOTE — PROGRESS NOTE ADULT - SUBJECTIVE AND OBJECTIVE BOX
[facility] - [facility]                                   [address] [place] [address]                                                      PHONE: [phone]                                                         FAX: [phone]  [email or web address]  ---------------------------------------------------------------------------------------------------------------------------------    Overnight events/patient complaints:      Feeling well post procedure     Allergy Status Unknown    MEDICATIONS  (STANDING):  oxyCODONE    IR 5 milliGRAM(s) Oral every 6 hours    MEDICATIONS  (PRN):  acetaminophen     Tablet .. 650 milliGRAM(s) Oral every 6 hours PRN Mild Pain (1 - 3)  ondansetron Injectable 4 milliGRAM(s) IV Push every 8 hours PRN Nausea and/or Vomiting      Vital Signs Last 24 Hrs  T(C): 36.6 (12 Sep 2024 09:00), Max: 36.7 (11 Sep 2024 10:44)  T(F): 97.9 (12 Sep 2024 09:00), Max: 98.1 (11 Sep 2024 21:30)  HR: 59 (12 Sep 2024 09:00) (47 - 60)  BP: 96/57 (12 Sep 2024 09:00) (90/67 - 109/59)  BP(mean): 75 (11 Sep 2024 15:43) (61 - 75)  RR: 18 (12 Sep 2024 09:00) (14 - 18)  SpO2: 100% (12 Sep 2024 09:00) (99% - 100%)    Parameters below as of 12 Sep 2024 09:00  Patient On (Oxygen Delivery Method): room air      ICU Vital Signs Last 24 Hrs  NANY RODRIGES  I&O's Detail    11 Sep 2024 07:01  -  12 Sep 2024 07:00  --------------------------------------------------------  IN:    Oral Fluid: 640 mL  Total IN: 640 mL    OUT:  Total OUT: 0 mL    Total NET: 640 mL        I&O's Summary    11 Sep 2024 07:01  -  12 Sep 2024 07:00  --------------------------------------------------------  IN: 640 mL / OUT: 0 mL / NET: 640 mL      Drug Dosing Weight  NANY RODRIGES      PHYSICAL EXAM:  General: Appears well developed, alert and cooperative.  HEENT: Head; normocephalic, atraumatic.  Eyes: Pupils reactive, cornea wnl.  Neck: Supple, no nodes adenopathy, no NVD or carotid bruit or thyromegaly.  CARDIOVASCULAR: Normal S1 and S2, No murmur, rub, gallop or lift.   LUNGS: No rales, rhonchi or wheeze. Normal breath sounds bilaterally.  ABDOMEN: Soft, nontender without mass or organomegaly. bowel sounds normoactive.  EXTREMITIES: No clubbing, cyanosis or edema. Distal pulses wnl.   SKIN: warm and dry with normal turgor.  NEURO: Alert/oriented x 3/normal motor exam. No pathologic reflexes.    PSYCH: normal affect.        LABS:                        12.8   11.54 )-----------( 236      ( 12 Sep 2024 04:53 )             39.1     -12    137  |  100  |  10.0  ----------------------------<  92  4.0   |  27.0  |  0.57    Ca    8.7      12 Sep 2024 04:53      NANY RODRIGES        Urinalysis Basic - ( 12 Sep 2024 04:53 )    Color: x / Appearance: x / SG: x / pH: x  Gluc: 92 mg/dL / Ketone: x  / Bili: x / Urobili: x   Blood: x / Protein: x / Nitrite: x   Leuk Esterase: x / RBC: x / WBC x   Sq Epi: x / Non Sq Epi: x / Bacteria: x        RADIOLOGY & ADDITIONAL STUDIES:    INTERPRETATION OF TELEMETRY (personally reviewed):      ____________________________________________________________________________________     CONCLUSIONS:     1. Left ventricular cavity is normal in size. Left ventricular wall thickness is normal. Left ventricular systolic function is severely decreased with an ejection fraction visually estimated at 30 to 35 %.   2. Pulmonary artery systolic pressure could not be estimated.   3. Trace pericardial effusion.    ________________________________________________________________________________________    < end of copied text >      < from: MR Cardiac w/wo IV Cont (24 @ 16:00) >    ACC: [id number] EXAM:  MR CARD MORPH FUNCTION WAW IC   ORDERED BY:   DORYS GOMES     PROCEDURE DATE:  2024          INTERPRETATION:  HISTORY: 23-year-old woman. Cardiac arrest. Cardiogenic   shock. Echocardiogram performed 9/3/2024 demonstrated LVEF of 30-35%.    EXAMINATION: Cardiac MRI without and with Gadolinium.    TECHNIQUE: Cardiac MRI was performed before and after the administration   of intravenous gadolinium.    CONTRAST/COMPLICATIONS:  IV Contrast: Gadavist  9 cc administered   1 cc discarded  Oral Contrast: NONE  Complications: None reported at time of study completion      FINDINGS:    Chambers: The left ventricle is normal in size. There is no left   ventricular hypertrophy/thickening. Wall motion of the left ventricular   mid wall segments is hypokinetic. The wall motion of the left ventricular   basilar and apical wall segments appear preserved.    The left atrium is normal in size.    The right ventricle is normal in size. There is normal right ventricular   wall motion and function.    The right atrium is normal in size.    No immediate resting perfusion defects identified within the left   ventricular myocardium after administration of gadolinium. There is no   convincing evidence of late gadolinium enhancement of the left or right   ventricular myocardium.    LVEDV is 179 mL  LVESV is 85 mL  LVSV is 94 mL  LVEF is 53%    RVEDV is 150 mL  RVESV is 62 mL  RVSV is 88 mL  RVEF is 59%      Valves: No CMR evidence of valvular abnormality.    Vessels: Thoracic aorta is normal in caliber.    Thorax: Small pericardial effusion. Trace left pleural effusion. No large   mediastinal or axillary nodes.    Imaged Abdomen: Unremarkable.    IMPRESSION:.    No convincing evidence of myocardial scarring.    There is hypokinesis of the mid LV wall segments and preserved wall   motion of the LV basilar and apical wall segments.    The calculated left ventricular ejection fraction is 53%.    Small pericardial effusion.    --- End of Report ---             < from: CT Angio Cardiac w/ IV Cont (24 @ 09:18) >    ACC: [id number] EXAM:  CT ANGIO HEART CORONARY IC   ORDERED BY: LINDSAY GREEN     PROCEDURE DATE:  2024          INTERPRETATION:  .  Patient: NANY RODRIGES  : 2000  MRN: [id number]  ACC: [id number]  Order Date: 9/3/2024 9:18 AM  Exam: CT ANGIO HEART CORONARY    INDICATION: Cardiac arrest, syncope rule out anomalous coronary artery.  COMPARISON: CT chest 2024.  TECHNIQUE: Unenhanced prospective and enhanced retrospective images were   obtained of the heart using a Siemens Definition Edge scanner. Images   were obtained after the uneventful administration of nonionic intravenous   contrast.  CONTRAST VOLUME: Omnipaque 350. 80 cc administered. 20 cc discarded.  DOSE: 924 mGy.cm.  QUALITY:  Excellent.    FINDINGS:    CALCIUMSCORE: The calculated Agatston score is 0.    CORONARY: Co-coronary artery dominance. No evidence for anomalous   coronary arteries.    LEFT MAIN: Normal bifurcation into LAD, LCX.    LEFT ANTERIOR DESCENDING: There is one diagonal branch. Distal vessel   wraps around apex.    Proximal: Normal.  Mid: Normal.  Distal: Normal.    First diagonal: Normal.  Second diagonal: Normal.    LEFT CIRCUMFLEX: There is one obtuse marginal branch. Gives rise to the   PLV division    Proximal: Normal.  Mid/Distal: Normal.    First obtuse marginal: Normal.    PLV: Normal.    RIGHT CORONARY ARTERY: Gives rise to PDA.    Proximal: Normal.  Mid: Normal.  Distal: Normal.    Posterior descending: Small caliber vessel.    MORPHOLOGY: The LV is dilated.  Normal end diastolic left ventricular   wall thickness.    VALVES: The aortic valve is trileaflet. The anterior and posterior   leaflets of the mitral valve are normal..    PERICARDIUM: Normal pericardial contour. No pericardial effusion.    AORTA: Normal in diameter.    NONCARDIAC FINDINGS: Mild atelectasis within basilar left lower lobe.   Patchy opacities within the left lung are decreased.      IMPRESSION:    No anomalous coronary artery. No plaque or stenosis.    --- End of Report ---            ARMANDO WARD MD; Attending Radiologist  This document has been electronically signed. Sep  3 2024  9:43AM    < end of copied text >          Assessment and Plan:   · Assessment    Assessment  s/p cardiac arrest with presumed Brugada Type 1 exercise provoked  Post arrest LV dysfunction improved on cardiac MRI LV EF 53% see above  No evidence of cardiomyopthy on MRI/no evidence of anomalous coronary on CT Coronary   Shock liver post arrest with improving LFTs  H/o recurrent exercise related syncope      s/p AICD with Dr Lindsey     Rec  Eventual outpt genetic testing - SCD panel to include Brugada    DC planning

## 2024-09-12 NOTE — PROGRESS NOTE ADULT - REASON FOR ADMISSION
Cardiac Arrest
Cardiac Arrest/ Anoxic Brain Injury/ Aspiration PNA
Cardiac Arrest
Cardiac Arrest/ Cardiogenic Shock/ Aspiration PNA/ Anoxic Brain Injury

## 2024-09-12 NOTE — PROGRESS NOTE ADULT - SUBJECTIVE AND OBJECTIVE BOX
Dc Weinberg MD  [facility] Medicine  Contact via Teams or text/call at [phone]    Patient is a 23y old  Female who presents with a chief complaint of Cardiac Arrest (11 Sep 2024 14:38)    Feels good.  Mild soreness around insertion site.      Patient seen and examined at bedside. No overnight events reported.     ALLERGIES:  Allergy Status Unknown    MEDICATIONS  (STANDING):  oxyCODONE    IR 5 milliGRAM(s) Oral every 6 hours    MEDICATIONS  (PRN):  acetaminophen     Tablet .. 650 milliGRAM(s) Oral every 6 hours PRN Mild Pain (1 - 3)  ondansetron Injectable 4 milliGRAM(s) IV Push every 8 hours PRN Nausea and/or Vomiting    Vital Signs Last 24 Hrs  T(F): 97.9 (12 Sep 2024 09:00), Max: 98.1 (11 Sep 2024 21:30)  HR: 59 (12 Sep 2024 09:00) (47 - 60)  BP: 96/57 (12 Sep 2024 09:00) (90/67 - 109/59)  RR: 18 (12 Sep 2024 09:00) (14 - 18)  SpO2: 100% (12 Sep 2024 09:00) (97% - 100%)  I&O's Summary    11 Sep 2024 07:01  -  12 Sep 2024 07:00  --------------------------------------------------------  IN: 640 mL / OUT: 0 mL / NET: 640 mL    PHYSICAL EXAM:  General: NAD, A/O x 3  ENT: No gross hearing impairment, Moist mucous membranes, no thrush  Neck: Supple, No JVD  Chest: icd insertion site clean/dry/intact  Lungs: Clear to auscultation bilaterally, good air entry, non-labored breathing  Cardio: RRR, S1/S2, No murmur  Abdomen: Soft, Nontender, Nondistended; Bowel sounds present  Extremities: No calf tenderness, No cyanosis, No pitting edema  Psych: Appropriate mood and affect    LABS:                        12.8   11.54 )-----------( 236      ( 12 Sep 2024 04:53 )             39.1     09-12    137  |  100  |  10.0  ----------------------------<  92  4.0   |  27.0  |  0.57    Ca    8.7      12 Sep 2024 04:53    TPro  6.1  /  Alb  3.2  /  TBili  0.3  /  DBili  0.1  /  AST  131  /  ALT  269  /  AlkPhos  68  09-10    09-05 Chol 217 mg/dL LDL -- HDL 64 mg/dL Trig 142 mg/dL    ABG - ( 11 Sep 2024 11:07 )  pH, Arterial: 7.440 pH, Blood: x     /  pCO2: 41    /  pO2: 480   / HCO3: 28    / Base Excess: 3.6   /  SaO2: 100.0     Urinalysis Basic - ( 12 Sep 2024 04:53 )    Color: x / Appearance: x / SG: x / pH: x  Gluc: 92 mg/dL / Ketone: x  / Bili: x / Urobili: x   Blood: x / Protein: x / Nitrite: x   Leuk Esterase: x / RBC: x / WBC x   Sq Epi: x / Non Sq Epi: x / Bacteria: x    RADIOLOGY & ADDITIONAL TESTS:    Care Discussed with Consultants/Other Providers:

## 2024-09-12 NOTE — PROGRESS NOTE ADULT - SUBJECTIVE AND OBJECTIVE BOX
Pt doing well POD #1 s/p EV-ICD. Denies complaint.     EKG: SR 64bpm with RBBB, JAYME 168ms, QRSD 112ms  TELE: SR with episodes of Sinus tachycardia ~114bpm during waking hours and Sinus Bradycardia down to 40bpm during sleeping hours    MEDICATIONS  (STANDING):  oxyCODONE    IR 5 milliGRAM(s) Oral every 6 hours    MEDICATIONS  (PRN):  acetaminophen     Tablet .. 650 milliGRAM(s) Oral every 6 hours PRN Mild Pain (1 - 3)  ondansetron Injectable 4 milliGRAM(s) IV Push every 8 hours PRN Nausea and/or Vomiting      Allergies  Allergy Status Unknown        PAST MEDICAL & SURGICAL HISTORY:  Syncopal episodes          Vital Signs Last 24 Hrs  T(C): 36.6 (12 Sep 2024 09:00), Max: 36.7 (11 Sep 2024 21:30)  T(F): 97.9 (12 Sep 2024 09:00), Max: 98.1 (11 Sep 2024 21:30)  HR: 59 (12 Sep 2024 09:00) (47 - 59)  BP: 96/57 (12 Sep 2024 09:00) (96/57 - 109/59)  BP(mean): 75 (11 Sep 2024 15:43) (61 - 75)  RR: 18 (12 Sep 2024 09:00) (14 - 18)  SpO2: 100% (12 Sep 2024 09:00) (99% - 100%)    Parameters below as of 12 Sep 2024 09:00  Patient On (Oxygen Delivery Method): room air        Physical Exam:  Constitutional: NAD, AAOx3  Cardiovascular: +S1S2 RRR  Incision: Dressing C/D/I; no bleeding, hematoma, erythema or edema  Pulmonary: CTA b/l, unlabored  Abd: soft NTND +BS  Extremities: no pedal edema, +distal pulses b/l  Neuro: CN II-XII grossly intact, BEAVER x4     LABS:                        12.8   11.54 )-----------( 236      ( 12 Sep 2024 04:53 )             39.1     09-12    137  |  100  |  10.0  ----------------------------<  92  4.0   |  27.0  |  0.57    Ca    8.7      12 Sep 2024 04:53        Urinalysis Basic - ( 12 Sep 2024 04:53 )    Color: x / Appearance: x / SG: x / pH: x  Gluc: 92 mg/dL / Ketone: x  / Bili: x / Urobili: x   Blood: x / Protein: x / Nitrite: x   Leuk Esterase: x / RBC: x / WBC x   Sq Epi: x / Non Sq Epi: x / Bacteria: x        Assessment:   23y Female with prior h/o near syncope and 1 episode of syncope s/p cardiac arrest while running. Patient was found PEA, 3 rounds of CPR, 1 epi in the field, found to be in VTach and defibrillated. Upon arrival to ED, sinus bradycardia in 30s, atropine was given with improvement in HR, patient intubated for airway protection.  Pt has since been extubated and upon re-evaluation of prior exercise stress test, Type 1 Brugada was found. Patient now POD #1 status post uncomplicated EV- ICD insertion for secondary prevention.      Plan:   Cont Ancef 2gm IV q 8 hours x 2 additional doses to complete 24 hour course.   Pain control with PO analgesia PRN.  Consider discharging with Oxycodone 25.mg q 8 hours prn moderated pain x 5 days  NO HEPARIN OR LOVENOX, INCLUDING PROPHYLACTIC/SUBCUT DOSING, UNTIL OTHERWISE ADVISED BY EP.   PA/Lat CXR reviewed and device check completed.  Lead location verified and device functioning normally   Pt instructed as to activity limitations - no lifting/pushing/pulling >10 lbs or strenuous exercise x 1 week.   Pt instructed as to access site care and f/up - written instructions included in d/c documents.  Pt is advised that in accordance with Heart Rhythm Society guidelines and [place] Vehicle and Traffic Law, she should not drive until she has been arrhythmia free (with no clinically significant arrhythmias or ICD therapies) for a minimum of 6 months; all questions answered to pt's expressed understanding.  Further care and management as per primary team and [facility] Team.

## 2025-07-11 NOTE — DISCHARGE NOTE PROVIDER - NSDCFUADDINST_GEN_ALL_CORE_FT
Follow up with Dr Lindsey in 4-6 weeks.  Pt was advised that in accordance with Heart Rhythm Society guidelines and [place] Vehicle and Traffic Law, she should not drive until she has been arrhythmia free (with no clinically significant arrhythmias or ICD therapies) for a minimum of 6 months; all questions answered to pt's expressed understanding.
normal S1, S2 heard

## (undated) DEVICE — DRAPE IOBAN 23" X 23"

## (undated) DEVICE — PACING CABLE SURGICAL 12FT WITH SMALL CLIP

## (undated) DEVICE — BLADE SCALPEL SAFETYLOCK #11

## (undated) DEVICE — SAW BLADE MICROAIRE STERNUM 1X34X9.4MM

## (undated) DEVICE — SOL IRR POUR NS 0.9% 1000ML

## (undated) DEVICE — STAPLER SKIN PROXIMATE

## (undated) DEVICE — VENODYNE/SCD SLEEVE CALF LARGE

## (undated) DEVICE — WARMING BLANKET FULL UNDERBODY

## (undated) DEVICE — DRAPE CV 106" X 135"

## (undated) DEVICE — PACK MINOR WITH LAP

## (undated) DEVICE — Device

## (undated) DEVICE — DRAPE C ARM UNIVERSAL

## (undated) DEVICE — VISITEC 4X4

## (undated) DEVICE — LAP PAD 18 X 18"

## (undated) DEVICE — WOUND IRR IRRISEPT W 0.5 CHG

## (undated) DEVICE — POSITIONER FOAM EGG CRATE ULNAR 2PCS (PINK)

## (undated) DEVICE — DRAPE 3/4 SHEET W REINFORCEMENT 56X77"

## (undated) DEVICE — PREP CHLORAPREP HI-LITE ORANGE 26ML

## (undated) DEVICE — BLADE SCALPEL SAFETYLOCK #15

## (undated) DEVICE — DRAPE TOWEL BLUE 17" X 24"

## (undated) DEVICE — PREP SCRUB BRUSH W CHG 4%

## (undated) DEVICE — SOL INJ NS 0.9% 1000ML

## (undated) DEVICE — DRAPE INSTRUMENT POUCH 6.75" X 11"

## (undated) DEVICE — SUT SOFSILK 2-0 18" TIES

## (undated) DEVICE — SPECIMEN CONTAINER 100ML

## (undated) DEVICE — GLV 7.5 PROTEXIS (WHITE)